# Patient Record
Sex: FEMALE | Race: WHITE | HISPANIC OR LATINO | Employment: FULL TIME | URBAN - METROPOLITAN AREA
[De-identification: names, ages, dates, MRNs, and addresses within clinical notes are randomized per-mention and may not be internally consistent; named-entity substitution may affect disease eponyms.]

---

## 2017-01-06 ENCOUNTER — OFFICE VISIT (OUTPATIENT)
Dept: BEHAVIORAL HEALTH | Facility: PHYSICIAN GROUP | Age: 27
End: 2017-01-06
Payer: COMMERCIAL

## 2017-01-06 DIAGNOSIS — F41.0 PANIC DISORDER WITHOUT AGORAPHOBIA: ICD-10-CM

## 2017-01-06 PROCEDURE — 90834 PSYTX W PT 45 MINUTES: CPT | Performed by: PSYCHOLOGIST

## 2017-01-06 NOTE — BH THERAPY
[facility]  Therapy Progress Note    Patient Name: Elinor Breaux  Patient MRN: [id number]  Today's Date: 1/6/2016     Type of session:Individual psychotherapy  Length of session: 50 minutes  Persons in attendance:Patient    Subjective/New Info: Pt reported experiencing anxiety over break while home with family, elpidio during a few foggy days. Pt reported leaving a few days early to return home. Pt also reported having feelings of constriction or tingling, elpidio when she is aware of her upstairs neighbors. Practiced extended mindfulness in session re: thoughts, feelings, and sensations. Pt reported having catastrophic thoughts/images. Discussed implementing mindfulness practice daily, returning attention back to her breath and bodily sensations for grounding purposes. Pt will continue mindfulness practice on own.     Objective/Observations:   Participation: Active verbal participation, Engaged and Open to feedback   Grooming: Casual   Cognition: Fully Oriented   Eye contact: Good   Mood: Euthymic and Anxious   Affect: Flexible, Congruent with content and Anxious   Thought process: Logical, Goal-directed and Linear   Speech: WNL    Diagnoses:   1. Panic disorder without agoraphobia       Current risk:   SUICIDE: Low. Denied SI, Intent, Plan.   Homicide: Not applicable   Self-harm: Not applicable   Relapse: Not applicable   Safety Plan reviewed? Not Indicated    Therapeutic Intervention(s): Assess mood, anxiety and coping, Mindfulness practice    Treatment Goal(s)/Objective(s) addressed: Reduce impact of anxiety on pt's mood and functioning, Increase valued-life attainment     Progress toward Treatment Goals: Mild improvement    Plan:  - Continue Individual therapy  - Patient is in agreement with the above plan:  YES    Cleo Talbert P.H.D.

## 2017-01-07 ENCOUNTER — HOSPITAL ENCOUNTER (OUTPATIENT)
Dept: LAB | Facility: MEDICAL CENTER | Age: 27
End: 2017-01-07
Attending: NURSE PRACTITIONER
Payer: COMMERCIAL

## 2017-01-07 DIAGNOSIS — E55.9 VITAMIN D DEFICIENCY: ICD-10-CM

## 2017-01-07 DIAGNOSIS — D72.819 LEUKOPENIA, UNSPECIFIED TYPE: ICD-10-CM

## 2017-01-07 DIAGNOSIS — R12 HEARTBURN: ICD-10-CM

## 2017-01-07 LAB
25(OH)D3 SERPL-MCNC: 31 NG/ML (ref 30–100)
BASOPHILS # BLD AUTO: 0.05 K/UL (ref 0–0.12)
BASOPHILS NFR BLD AUTO: 0.6 % (ref 0–1.8)
EOSINOPHIL # BLD: 0.33 K/UL (ref 0–0.51)
EOSINOPHIL NFR BLD AUTO: 4 % (ref 0–6.9)
ERYTHROCYTE [DISTWIDTH] IN BLOOD BY AUTOMATED COUNT: 40 FL (ref 35.9–50)
HCT VFR BLD AUTO: 40.4 % (ref 37–47)
HGB BLD-MCNC: 13 G/DL (ref 12–16)
IMM GRANULOCYTES # BLD AUTO: 0.02 K/UL (ref 0–0.11)
IMM GRANULOCYTES NFR BLD AUTO: 0.2 % (ref 0–0.9)
LYMPHOCYTES # BLD: 2.42 K/UL (ref 1–4.8)
LYMPHOCYTES NFR BLD AUTO: 29.2 % (ref 22–41)
MCH RBC QN AUTO: 26.3 PG (ref 27–33)
MCHC RBC AUTO-ENTMCNC: 32.2 G/DL (ref 33.6–35)
MCV RBC AUTO: 81.8 FL (ref 81.4–97.8)
MONOCYTES # BLD: 0.35 K/UL (ref 0–0.85)
MONOCYTES NFR BLD AUTO: 4.2 % (ref 0–13.4)
NEUTROPHILS # BLD: 5.11 K/UL (ref 2–7.15)
NEUTROPHILS NFR BLD AUTO: 61.8 % (ref 44–72)
NRBC # BLD AUTO: 0 K/UL
NRBC BLD-RTO: 0 /100 WBC
PLATELET # BLD AUTO: 381 K/UL (ref 164–446)
PMV BLD AUTO: 9.9 FL (ref 9–12.9)
RBC # BLD AUTO: 4.94 M/UL (ref 4.2–5.4)
WBC # BLD AUTO: 8.3 K/UL (ref 4.8–10.8)

## 2017-01-07 PROCEDURE — 36415 COLL VENOUS BLD VENIPUNCTURE: CPT

## 2017-01-07 PROCEDURE — 82306 VITAMIN D 25 HYDROXY: CPT

## 2017-01-07 PROCEDURE — 85025 COMPLETE CBC W/AUTO DIFF WBC: CPT

## 2017-01-11 DIAGNOSIS — R12 HEARTBURN: ICD-10-CM

## 2017-01-12 RX ORDER — OMEPRAZOLE 20 MG/1
20 CAPSULE, DELAYED RELEASE ORAL DAILY
Qty: 90 CAP | Refills: 0 | Status: SHIPPED | OUTPATIENT
Start: 2017-01-12 | End: 2017-05-30 | Stop reason: SDUPTHER

## 2017-01-12 NOTE — TELEPHONE ENCOUNTER
Please advise patient refill for 90 days, needs to keep upcoming appointment to est. Care with new provider. Thank you. ALINA Estrada

## 2017-01-12 NOTE — TELEPHONE ENCOUNTER
From: Elinor Breaux  To: SHAY Ibarra  Sent: 1/11/2017 10:15 PM PST  Subject: Medication Renewal Request    Original authorizing provider: SHAY Ibarra would like a refill of the following medications:  omeprazole (PRILOSEC) 20 MG delayed-release capsule [SHAY Ibarra]    Preferred pharmacy: [facility] [address] [place] [address], [place] - [address] [facility] [address]    Comment:

## 2017-01-20 ENCOUNTER — OFFICE VISIT (OUTPATIENT)
Dept: BEHAVIORAL HEALTH | Facility: PHYSICIAN GROUP | Age: 27
End: 2017-01-20
Payer: COMMERCIAL

## 2017-01-20 DIAGNOSIS — F41.0 PANIC DISORDER WITHOUT AGORAPHOBIA: ICD-10-CM

## 2017-01-20 PROCEDURE — 90834 PSYTX W PT 45 MINUTES: CPT | Performed by: PSYCHOLOGIST

## 2017-01-21 NOTE — BH THERAPY
" [facility]  Therapy Progress Note    Patient Name: Elinor Breaux  Patient MRN: [id number]  Today's Date: 1/19/2017     Type of session:Individual psychotherapy  Length of session: 45 minutes  Persons in attendance:Patient    Subjective/New Info: Pt reported some improvements in her anxiety. Has not had a panic attack since prior to last session. Still endorses anxious feelings and symptoms characteristic of panic disorder. Also endorses thoughts about \"going crazy\" and fears of not getting better. Discussed course and prognosis of anxiety treatments. Also introduced engagement in valued-life domains as important treatment outcome measure. Engaged in mindfulness practice and process.  . Pt will continue mindfulness practice on own.     Objective/Observations:   Participation: Active verbal participation, Engaged and Open to feedback   Grooming: Casual   Cognition: Fully Oriented   Eye contact: Good   Mood: Euthymic and Anxious   Affect: Flexible, Congruent with content, Anxious and Tearful   Thought process: Logical, Goal-directed and Linear   Speech: WNL    Diagnoses:   1. Panic disorder without agoraphobia       Current risk:   SUICIDE: Low. Denied SI, Intent, Plan.   Homicide: Not applicable   Self-harm: Not applicable   Relapse: Not applicable   Safety Plan reviewed? Not Indicated    Therapeutic Intervention(s): Assess mood, anxiety and coping, course and prognosis for anxiety treatment, Mindfulness practice    Treatment Goal(s)/Objective(s) addressed: Reduce impact of anxiety on pt's mood and functioning, Increase valued-life attainment     Progress toward Treatment Goals: Mild improvement    Plan:  - Continue Individual therapy  - Patient is in agreement with the above plan:  YES    JEANNETTE InterianoHDARLYN.                                     "

## 2017-02-21 ENCOUNTER — OFFICE VISIT (OUTPATIENT)
Dept: MEDICAL GROUP | Facility: MEDICAL CENTER | Age: 27
End: 2017-02-21
Payer: COMMERCIAL

## 2017-02-21 VITALS
SYSTOLIC BLOOD PRESSURE: 120 MMHG | RESPIRATION RATE: 16 BRPM | DIASTOLIC BLOOD PRESSURE: 78 MMHG | BODY MASS INDEX: 40.86 KG/M2 | OXYGEN SATURATION: 97 % | HEIGHT: 60 IN | HEART RATE: 99 BPM | TEMPERATURE: 97.9 F | WEIGHT: 208.11 LBS

## 2017-02-21 DIAGNOSIS — E66.01 MORBID OBESITY WITH BMI OF 40.0-44.9, ADULT (HCC): ICD-10-CM

## 2017-02-21 DIAGNOSIS — Z91.018 MULTIPLE FOOD ALLERGIES: ICD-10-CM

## 2017-02-21 DIAGNOSIS — F41.0 PANIC DISORDER WITHOUT AGORAPHOBIA: ICD-10-CM

## 2017-02-21 DIAGNOSIS — R12 HEARTBURN: ICD-10-CM

## 2017-02-21 DIAGNOSIS — E55.9 VITAMIN D DEFICIENCY: ICD-10-CM

## 2017-02-21 DIAGNOSIS — E78.5 HYPERLIPIDEMIA, UNSPECIFIED HYPERLIPIDEMIA TYPE: ICD-10-CM

## 2017-02-21 DIAGNOSIS — F41.9 ANXIETY: ICD-10-CM

## 2017-02-21 PROCEDURE — 99214 OFFICE O/P EST MOD 30 MIN: CPT | Performed by: PHYSICIAN ASSISTANT

## 2017-02-21 RX ORDER — ALPRAZOLAM 0.5 MG/1
0.25 TABLET ORAL 3 TIMES DAILY PRN
Qty: 20 TAB | Refills: 0 | Status: SHIPPED | OUTPATIENT
Start: 2017-02-21 | End: 2017-09-01

## 2017-02-21 ASSESSMENT — PAIN SCALES - GENERAL: PAINLEVEL: NO PAIN

## 2017-02-21 NOTE — MR AVS SNAPSHOT
Elinor Breaux   2017 1:00 PM   Office Visit   MRN: [id number]    Department:  [place] [address]   Dept Phone:  [phone]    Description:  Female : 1990   Provider:  Cyn Guerin PA-C           Reason for Visit     Establish Care xanax refill       Allergies as of 2017     Allergen Noted Reactions    Iodine 2017   Anaphylaxis    Seafood allergy     Tree Nuts Food Allergy 2016   Anaphylaxis    Peanut allergy      You were diagnosed with     Heartburn   [787.1.ICD-9-CM]       Vitamin D deficiency   [7586833]       Anxiety   [206489]       Panic disorder without agoraphobia   [300.01.ICD-9-CM]       Multiple food allergies   [286141]       Hyperlipidemia, unspecified hyperlipidemia type   [8501955]         Vital Signs     Blood Pressure Pulse Temperature Respirations Height Weight    120/78 mmHg 99 36.6 °C (97.9 °F) 16 1.524 m (5') 94.4 kg (208 lb 1.8 oz)    Body Mass Index Oxygen Saturation Last Menstrual Period Breastfeeding? Smoking Status       40.64 kg/m2 97% 2016 No Never Smoker        Basic Information     Date Of Birth Sex Race Ethnicity Preferred Language    1990 Female White  Origin (Belarusian,Burmese,Tuvaluan,Sharan, etc) English      Your appointments     Mar 10, 2017  2:00 PM   Individual Therapy with Cleo Talbert P.H.D.   [facility]    [address]  [address]  [place] [address]   [phone]            Mar 24, 2017  3:00 PM   Individual Therapy with JEANNETTE InterianoHCRUZITO   [facility]    [address]  [address]  [place] [address]   [phone]            2017  3:00 PM   Individual Therapy with Cleo Talbert P.H.D.   [facility]    [address]  [address]  [place] [address]   [phone]            2017  3:00 PM   Individual Therapy with JEANNETTE InterianoHCRUZITO   [facility]    [address]  [address]  [place] [id number] [phone]            Jun 09, 2017  1:00 PM   Established Patient with Cyn Guerin PA-C   [facility]    [address]  [address]  [place] [address] [phone]           You will be receiving a confirmation call a few days before your appointment from our automated call confirmation system.              Problem List              ICD-10-CM Priority Class Noted - Resolved    Multiple food allergies Z91.018   8/12/2016 - Present    Preventative health care Z00.00   8/12/2016 - Present    Obesity (BMI 35.0-39.9 without comorbidity) (HCC) E66.9   8/12/2016 - Present    Heartburn R12   10/14/2016 - Present    Vitamin D deficiency E55.9   10/14/2016 - Present    Panic disorder without agoraphobia F41.0   10/21/2016 - Present    Situational phobia F40.248   10/21/2016 - Present      Health Maintenance        Date Due Completion Dates    PAP SMEAR 6/2/2009 ---    IMM INFLUENZA (1) 9/1/2016 ---    IMM DTaP/Tdap/Td Vaccine (2 - Td) 5/10/2023 5/10/2013            Current Immunizations     Tdap Vaccine 5/10/2013      Below and/or attached are the medications your provider expects you to take. Review all of your home medications and newly ordered medications with your provider and/or pharmacist. Follow medication instructions as directed by your provider and/or pharmacist. Please keep your medication list with you and share with your provider. Update the information when medications are discontinued, doses are changed, or new medications (including over-the-counter products) are added; and carry medication information at all times in the event of emergency situations     Allergies:  IODINE - Anaphylaxis     TREE NUTS FOOD ALLERGY - Anaphylaxis               Medications  Valid as of: February 21, 2017 -  1:51 PM    Generic Name Brand Name Tablet Size Instructions for use    ALPRAZolam (Tab) XANAX 0.5 MG Take 0.5 Tabs by mouth 3  times a day as needed for Sleep.        EPINEPHrine   by Injection route.        Ergocalciferol (Cap) DRISDOL 19583 UNIT Take 1 Cap by mouth every 7 days.        Omeprazole (CAPSULE DELAYED RELEASE) PRILOSEC 20 MG Take 1 Cap by mouth every day.        .                 Medicines prescribed today were sent to:     [facility]  [place], [place] - [address] [facility]    [address] [facility] [place] [address]    Phone: [phone] Fax: [phone]    Open 24 Hours?: No      Medication refill instructions:       If your prescription bottle indicates you have medication refills left, it is not necessary to call your provider’s office. Please contact your pharmacy and they will refill your medication.    If your prescription bottle indicates you do not have any refills left, you may request refills at any time through one of the following ways: The online [facility] system (except Urgent Care), by calling your provider’s office, or by asking your pharmacy to contact your provider’s office with a refill request. Medication refills are processed only during regular business hours and may not be available until the next business day. Your provider may request additional information or to have a follow-up visit with you prior to refilling your medication.   *Please Note: Medication refills are assigned a new Rx number when refilled electronically. Your pharmacy may indicate that no refills were authorized even though a new prescription for the same medication is available at the pharmacy. Please request the medicine by name with the pharmacy before contacting your provider for a refill.        Your To Do List     Future Labs/Procedures Complete By Expires    LIPID PROFILE  As directed 2/22/2018    PAIN MANAGEMENT SCRN, UR  As directed 2/21/2018    Comments:    Current Meds (name, sig, last dose):   Current outpatient prescriptions:   •  alprazolam, 0.25 mg, Oral, TID PRN  •  omeprazole, 20 mg,  Oral, DAILY  •  ergocalciferol, 50,000 Units, Oral, Q7 DAYS  •  EPINEPHrine (EPIPEN INJ), by Injection route., prn          VITAMIN D,25 HYDROXY  As directed 2/22/2018         [employer] Access Code: Activation code not generated  Current Cliqset Status: Active

## 2017-02-21 NOTE — ASSESSMENT & PLAN NOTE
Has been seeing therapist since 10/2016.   Has been much better but still likes to have xanax 0.5mg prn for plane rides or other occasions.   - 8/01/16 xanax 0.5mg #20.   She is just now running out and would like refill.   Never drinks or drives with med.

## 2017-02-21 NOTE — ASSESSMENT & PLAN NOTE
Was really bad when 22yo.   Coffee, pizza and other mexican foods would make it back.   Treating with prilosec 20mg qd  No sx since starting this med.   Denies CP, SOB, coughing.

## 2017-02-21 NOTE — PROGRESS NOTES
Chief Complaint   Patient presents with   • Establish Care     xanax refill      HPI:   Marquita Ding is a 26 y.o. female here to establish care and discuss vitamin D, heartburn, panic disorder    Was seeing Julieta Tucker PA-C    Heartburn  Was really bad when 22yo.   Coffee, pizza and other mexican foods would make it back.   Treating with prilosec 20mg qd  No sx since starting this med.   Denies CP, SOB, coughing.     Vitamin D deficiency  Was prescribed vit D 50,000iu weekly for 3 mo.   So stopped taking this when it ran out.   Results for MARQUITA DING (MRN [id number]) as of 2/21/2017 13:23   Ref. Range 10/1/2016 11:44 1/7/2017 08:45   25-Hydroxy   Vitamin D 25 Latest Ref Range:  ng/mL 18 (L) 31       Panic disorder without agoraphobia  Has been seeing therapist since 10/2016.   Has been much better but still likes to have xanax 0.5mg prn for plane rides or other occasions.   - 8/01/16 xanax 0.5mg #20.   She is just now running out and would like refill.   Never drinks or drives with med.       Current medicines (including changes today)  Current Outpatient Prescriptions   Medication Sig Dispense Refill   • alprazolam (XANAX) 0.5 MG Tab Take 0.5 Tabs by mouth 3 times a day as needed for Sleep. 20 Tab 0   • omeprazole (PRILOSEC) 20 MG delayed-release capsule Take 1 Cap by mouth every day. 90 Cap 0   • ergocalciferol (DRISDOL) 83683 UNIT capsule Take 1 Cap by mouth every 7 days. 12 Cap 0   • EPINEPHrine (EPIPEN INJ) by Injection route.       No current facility-administered medications for this visit.     She  has a past medical history of Anxiety; Asthma; and Allergy.  She  has no past surgical history on file.  Social History   Substance Use Topics   • Smoking status: Never Smoker    • Smokeless tobacco: Never Used   • Alcohol Use: Yes      Comment: 2 times every other month     Social History     Social History Narrative     Family History   Problem Relation Age of Onset   • Hypertension Mother    •  Anxiety disorder Mother    • Alcohol abuse Mother    • Diabetes Father    • Anxiety disorder Maternal Aunt    • Drug abuse Maternal Aunt    • Anxiety disorder Maternal Uncle      Family Status   Relation Status Death Age   • Mother Alive    • Father Alive    • Sister Alive    • Brother Alive        ROS  Constitutional: Negative for fever, chills, weight loss  HENT: Negative for ear pain, nosebleeds, congestion, sore throat and neck pain.   Eyes: Negative for blurred vision.   Respiratory: Negative for cough, sputum production, shortness of breath and wheezing.   Cardiovascular: Negative for chest pain, palpitations, orthopnea and leg swelling.   Gastrointestinal: + heartburn, negative for nausea, vomiting and abdominal pain.   Genitourinary: Negative for dysuria, urgency and frequency.   Musculoskeletal: Negative for myalgias, back pain and joint pain.   Skin: Negative for rash and itching.   Neurological: Negative for dizziness, tingling, tremors, sensory change, focal weakness and headaches.   Endo/Heme/Allergies: Does not bruise/bleed easily.   Psychiatric/Behavioral: Negative for depression, + anxiety, or neg memory loss.   All other systems reviewed and are negative except as in HPI.     Objective:     Blood pressure 120/78, pulse 99, temperature 36.6 °C (97.9 °F), resp. rate 16, height 1.524 m (5'), weight 94.4 kg (208 lb 1.8 oz), last menstrual period 02/01/2016, SpO2 97 %, not currently breastfeeding. Body mass index is 40.64 kg/(m^2).  Physical Exam:    Constitutional: Alert, no distress.  Skin: Warm, dry, good turgor, no rashes in visible areas.  Eye: PERRLA, conjunctiva clear, lids normal.  ENMT: Lips without lesions, good dentition, oropharynx clear.  Neck: Trachea midline, no masses, no thyromegaly.  Respiratory: Unlabored respiratory effort, lungs clear to auscultation, no wheezes, no ronchi.  Cardiovascular: Normal S1, S2, no murmur, no edema.  Abdomen: Soft, non-tender, no masses, no  hepatosplenomegaly.  Psych: Alert and oriented x3, normal affect and mood.    Assessment and Plan:   The following treatment plan was discussed     1. Heartburn  Controlled, since she has skip doses has felt fine. Discussed weaning off of this.  Continue working on diet, avoiding aggravating foods, elevating bed, not eating too late before lying down.    2.  Vitamin D deficiency  Much improved, continue with OTC vitamin D3. We'll recheck labs in another 3mo  - VITAMIN D,25 HYDROXY; Future    3. Anxiety  Stable, mostly just has claustrophobia especially with plane flights.  Refill give.   Do not drink or drive with med  - alprazolam (XANAX) 0.5 MG Tab; Take 0.5 Tabs by mouth 3 times a day as needed for Sleep.  Dispense: 20 Tab; Refill: 0  - Controlled Substance Treatment Agreement  - PAIN MANAGEMENT SCRN, UR; Future    4. Panic disorder without agoraphobia  See #3  - alprazolam (XANAX) 0.5 MG Tab; Take 0.5 Tabs by mouth 3 times a day as needed for Sleep.  Dispense: 20 Tab; Refill: 0  - Controlled Substance Treatment Agreement  - PAIN MANAGEMENT SCRN, UR; Future    5. Multiple food allergies  Multiple food allergies, has EpiPen on hand    6. Hyperlipidemia, unspecified hyperlipidemia type  Elevated, working on diet and exercise, has been for over 2 months now  We'll recheck labs in 4 months  - LIPID PROFILE; Future    7. Morbid obesity with BMI of 40.0-44.9, adult (CMS[facility])  working on diet and exercise, has been for over 2 months now. Encouraged to continue  Has seen a nutritionist in past. We'll follow-up with him if needed.  - Patient identified as having weight management issue.  Appropriate orders and counseling given.      Records requested.  Followup: Return in about 3 months (around 5/21/2017) for review labs.           Please note that this dictation was created using voice recognition software. I have made every reasonable attempt to correct obvious errors, but I expect that there are errors of grammar and  possibly content that I did not discover before finalizing the note.

## 2017-02-21 NOTE — ASSESSMENT & PLAN NOTE
Was prescribed vit D 50,000iu weekly for 3 mo.   So stopped taking this when it ran out.   Results for MARQUITA DIGN (MRN [id number]) as of 2/21/2017 13:23   Ref. Range 10/1/2016 11:44 1/7/2017 08:45   25-Hydroxy   Vitamin D 25 Latest Ref Range:  ng/mL 18 (L) 31

## 2017-03-10 ENCOUNTER — OFFICE VISIT (OUTPATIENT)
Dept: PEDIATRICS | Facility: CLINIC | Age: 27
End: 2017-03-10
Payer: COMMERCIAL

## 2017-03-10 DIAGNOSIS — F40.248 SITUATIONAL PHOBIA: ICD-10-CM

## 2017-03-10 DIAGNOSIS — F41.0 PANIC DISORDER WITHOUT AGORAPHOBIA: ICD-10-CM

## 2017-03-10 PROCEDURE — 90832 PSYTX W PT 30 MINUTES: CPT | Performed by: PSYCHOLOGIST

## 2017-03-10 NOTE — PROGRESS NOTES
Note Title:  Individual Outpatient Psychotherapy Progress Note      Name:  Elinor Breaux  MRN:  [id number]  :  1990  Age:  26 y.o.    Pediatrician:  Cyn Guerin PA-C    Service Rendered:  Individual Psychotherapy  Date of Service:  3/10/2017  Length of Service:  35 minutes    Persons in Attendence: Elinor    Interim History:  Pt reported continued improvements in her anxiety. Has not had a panic attack for several months now. Still experiences anticipatory anxiety or relaxation-induced anxiety, but anxiety has lessened in intensity, frequency and duration. Pt reported that it is interfering with her life much less. Experienced anxiety prior to flying, but managed it with medication without avoiding or changing the flight. Discussed course and prognosis of anxiety treatments. Also introduced engagement in valued-life domains as important treatment outcome measure. Discussed mindfulness practice/process. Pt will continue mindfulness practice on own.     Diagnostic Impressions:    1. Panic disorder without agoraphobia    2. Situational phobia        Mental Status Exam:   Appearance:  Well groomed, good hygiene, appears stated age.   Behavior:  Pleasant, sociable, cooperative.   Mood:  “good,” slightly anxious.   Affect:  Appropriate to mood, normal range.   Speech/Language:  Normal rate, rhythm, and tone.   Sensorium:  Alert and oriented to person, place, time, and situation.   Memory and Cognition:  Within normal limits, no evidence of gross cognitive, intellectual, or memory impairments   Thought Process/Thought Content:  Logical, linear, goal directed. Reality testing appears intact.    Insight/Judgment: Good.      Risk Assessment:  Elinor denied concerns regarding risk to self or others.       Treatment Recommendations and Plan:  Will continue individual therapy utilizing ACT approach to anxiety treatment.     The above diagnostic impressions, recommendations, and treatment plan were discussed  with and agreed upon by Elinor and his/her parents. Care will be coordinated with Elinor’s healthcare team, as appropriate.      Cleo Talbert, PhD  Licensed Psychologist  [facility]

## 2017-03-10 NOTE — MR AVS SNAPSHOT
Nahomizan Brooksas   3/10/2017 2:00 PM   Office Visit   MRN: [id number]    Department:  [facility] Med - Pediatrics   Dept Phone:  [phone]    Description:  Female : 1990   Provider:  Cleo Talbert P.H.D.           Reason for Visit     Anxiety           Allergies as of 3/10/2017     Allergen Noted Reactions    Iodine 2017   Anaphylaxis    Seafood allergy     Tree Nuts Food Allergy 2016   Anaphylaxis    Peanut allergy      You were diagnosed with     Panic disorder without agoraphobia   [300.01.ICD-9-CM]       Situational phobia   [871455]         Vital Signs     Last Menstrual Period Smoking Status                2016 Never Smoker           Basic Information     Date Of Birth Sex Race Ethnicity Preferred Language    1990 Female White  Origin (Persian,Bulgarian,Indian,Sharan, etc) English      Your appointments     Mar 24, 2017  3:00 PM   Individual Therapy with Cleo Talbert P.H.D.   [address] (--)    [address] [place] [address]  [facility] [id number] [phone]            2017  3:00 PM   Individual Therapy with Cleo Talbert P.H.D.   [address] (--)    [address] [place] [address], [facility] 201  [facility] 319092 [phone]            2017  3:00 PM   Individual Therapy with Cleo Talbert P.H.D.   [address] (--)    [address] [place] [address], [address]  [facility] 085082 [phone]            2017  1:00 PM   Established Patient with Cyn Guerin PA-C   [facility]    [address]  [place] [address] [phone]           You will be receiving a confirmation call a few days before your appointment from our automated call confirmation system.              Problem List              ICD-10-CM Priority Class Noted - Resolved    Multiple food allergies Z91.018    8/12/2016 - Present    Preventative health care Z00.00   8/12/2016 - Present    Obesity (BMI 35.0-39.9 without comorbidity) ([facility]) E66.9   8/12/2016 - Present    Heartburn R12   10/14/2016 - Present    Vitamin D deficiency E55.9   10/14/2016 - Present    Panic disorder without agoraphobia F41.0   10/21/2016 - Present    Situational phobia F40.248   10/21/2016 - Present    Morbid obesity with BMI of 40.0-44.9, adult ([facility]) E66.01, Z68.41   2/21/2017 - Present      Health Maintenance        Date Due Completion Dates    PAP SMEAR 6/2/2009 ---    IMM INFLUENZA (1) 9/1/2016 ---    IMM DTaP/Tdap/Td Vaccine (2 - Td) 5/10/2023 5/10/2013            Current Immunizations     Tdap Vaccine 5/10/2013      Below and/or attached are the medications your provider expects you to take. Review all of your home medications and newly ordered medications with your provider and/or pharmacist. Follow medication instructions as directed by your provider and/or pharmacist. Please keep your medication list with you and share with your provider. Update the information when medications are discontinued, doses are changed, or new medications (including over-the-counter products) are added; and carry medication information at all times in the event of emergency situations     Allergies:  IODINE - Anaphylaxis     TREE NUTS FOOD ALLERGY - Anaphylaxis               Medications  Valid as of: March 10, 2017 -  3:30 PM    Generic Name Brand Name Tablet Size Instructions for use    ALPRAZolam (Tab) XANAX 0.5 MG Take 0.5 Tabs by mouth 3 times a day as needed for Sleep.        EPINEPHrine   by Injection route.        Ergocalciferol (Cap) DRISDOL 38908 UNIT Take 1 Cap by mouth every 7 days.        Omeprazole (CAPSULE DELAYED RELEASE) PRILOSEC 20 MG Take 1 Cap by mouth every day.        .                 Medicines prescribed today were sent to:     [id number] - [place], [place] - [address] [facility] [address]    [address] [place]  [place] [address]    Phone: [phone] Fax: [phone]    Open 24 Hours?: No      Medication refill instructions:       If your prescription bottle indicates you have medication refills left, it is not necessary to call your provider’s office. Please contact your pharmacy and they will refill your medication.    If your prescription bottle indicates you do not have any refills left, you may request refills at any time through one of the following ways: The online MDCapsule system (except Urgent Care), by calling your provider’s office, or by asking your pharmacy to contact your provider’s office with a refill request. Medication refills are processed only during regular business hours and may not be available until the next business day. Your provider may request additional information or to have a follow-up visit with you prior to refilling your medication.   *Please Note: Medication refills are assigned a new Rx number when refilled electronically. Your pharmacy may indicate that no refills were authorized even though a new prescription for the same medication is available at the pharmacy. Please request the medicine by name with the pharmacy before contacting your provider for a refill.           MDCapsule Access Code: Activation code not generated  Current MDCapsule Status: Active

## 2017-03-24 ENCOUNTER — OFFICE VISIT (OUTPATIENT)
Dept: PEDIATRICS | Facility: CLINIC | Age: 27
End: 2017-03-24
Payer: COMMERCIAL

## 2017-03-24 DIAGNOSIS — F41.0 PANIC DISORDER WITHOUT AGORAPHOBIA: ICD-10-CM

## 2017-03-24 DIAGNOSIS — F40.248 SITUATIONAL PHOBIA: ICD-10-CM

## 2017-03-24 PROCEDURE — 90834 PSYTX W PT 45 MINUTES: CPT | Performed by: PSYCHOLOGIST

## 2017-03-24 NOTE — MR AVS SNAPSHOT
Nahomizan Brooksas   3/24/2017 3:00 PM   Appointment   MRN: [id number]    Department:  [facility] Med - Pediatrics   Dept Phone:  [phone]    Description:  Female : 1990   Provider:  Cleo Talbert P.H.D.           Allergies as of 3/24/2017     Allergen Noted Reactions    Iodine 2017   Anaphylaxis    Seafood allergy     Tree Nuts Food Allergy 2016   Anaphylaxis    Peanut allergy      Vital Signs     Smoking Status                   Never Smoker            Basic Information     Date Of Birth Sex Race Ethnicity Preferred Language    1990 Female White  Origin (Indian,Ghanaian,Dominican,Sharan, etc) English      Your appointments     2017  3:00 PM   Individual Therapy with Cleo Talbert P.H.D.   [address] (--)    [address] [place] [address], [address]  [facility] [address]6482 [phone]            2017  3:00 PM   Individual Therapy with Cleo Talbert P.H.D.   [address] (--)    [address] [place] [address], [address]  [facility] [address] [phone]            2017  1:00 PM   Established Patient with Cyn Guerin PA-C   [facility]    [address]  [address]  [place] [address] [phone]           You will be receiving a confirmation call a few days before your appointment from our automated call confirmation system.              Problem List              ICD-10-CM Priority Class Noted - Resolved    Multiple food allergies Z91.018   2016 - Present    Preventative health care Z00.00   2016 - Present    Obesity (BMI 35.0-39.9 without comorbidity) ([facility]) E66.9   2016 - Present    Heartburn R12   10/14/2016 - Present    Vitamin D deficiency E55.9   10/14/2016 - Present    Panic disorder without agoraphobia F41.0   10/21/2016 - Present    Situational phobia F40.248   10/21/2016 - Present    Morbid obesity with BMI of  40.0-44.9, adult ([facility]) E66.01, Z68.41   2/21/2017 - Present      Health Maintenance        Date Due Completion Dates    PAP SMEAR 6/2/2009 ---    IMM INFLUENZA (1) 9/1/2016 ---    IMM DTaP/Tdap/Td Vaccine (2 - Td) 5/10/2023 5/10/2013            Current Immunizations     Tdap Vaccine 5/10/2013      Below and/or attached are the medications your provider expects you to take. Review all of your home medications and newly ordered medications with your provider and/or pharmacist. Follow medication instructions as directed by your provider and/or pharmacist. Please keep your medication list with you and share with your provider. Update the information when medications are discontinued, doses are changed, or new medications (including over-the-counter products) are added; and carry medication information at all times in the event of emergency situations     Allergies:  IODINE - Anaphylaxis     TREE NUTS FOOD ALLERGY - Anaphylaxis               Medications  Valid as of: March 24, 2017 -  3:51 PM    Generic Name Brand Name Tablet Size Instructions for use    ALPRAZolam (Tab) XANAX 0.5 MG Take 0.5 Tabs by mouth 3 times a day as needed for Sleep.        EPINEPHrine   by Injection route.        Ergocalciferol (Cap) DRISDOL 87325 UNIT Take 1 Cap by mouth every 7 days.        Omeprazole (CAPSULE DELAYED RELEASE) PRILOSEC 20 MG Take 1 Cap by mouth every day.        .                 Medicines prescribed today were sent to:     [facility] [id number] [place] [address] - [facility] [address] [facility] AT [facility] & [address]    [address] [address]    Phone: [phone] Fax: [phone]    Open 24 Hours?: No      Medication refill instructions:       If your prescription bottle indicates you have medication refills left, it is not necessary to call your provider’s office. Please contact your pharmacy and they will refill your medication.    If your prescription bottle indicates you do not have any refills left, you may request  refills at any time through one of the following ways: The online StyleZen system (except Urgent Care), by calling your provider’s office, or by asking your pharmacy to contact your provider’s office with a refill request. Medication refills are processed only during regular business hours and may not be available until the next business day. Your provider may request additional information or to have a follow-up visit with you prior to refilling your medication.   *Please Note: Medication refills are assigned a new Rx number when refilled electronically. Your pharmacy may indicate that no refills were authorized even though a new prescription for the same medication is available at the pharmacy. Please request the medicine by name with the pharmacy before contacting your provider for a refill.           StyleZen Access Code: Activation code not generated  Current StyleZen Status: Active

## 2017-04-21 ENCOUNTER — OFFICE VISIT (OUTPATIENT)
Dept: PEDIATRICS | Facility: CLINIC | Age: 27
End: 2017-04-21
Payer: COMMERCIAL

## 2017-04-21 DIAGNOSIS — F40.248 SITUATIONAL PHOBIA: ICD-10-CM

## 2017-04-21 DIAGNOSIS — F41.0 PANIC DISORDER WITHOUT AGORAPHOBIA: ICD-10-CM

## 2017-04-21 PROCEDURE — 90834 PSYTX W PT 45 MINUTES: CPT | Performed by: PSYCHOLOGIST

## 2017-04-21 NOTE — MR AVS SNAPSHOT
Nahomizan Brooksas   2017 3:00 PM   Office Visit   MRN: [id number]    Department:  [facility] Med - Pediatrics   Dept Phone:  [phone]    Description:  Female : 1990   Provider:  Cleo Talbert P.H.D.           Reason for Visit     Anxiety           Allergies as of 2017     Allergen Noted Reactions    Iodine 2017   Anaphylaxis    Seafood allergy     Tree Nuts Food Allergy 2016   Anaphylaxis    Peanut allergy      You were diagnosed with     Panic disorder without agoraphobia   [300.01.ICD-9-CM]       Situational phobia   [880115]         Vital Signs     Smoking Status                   Never Smoker            Basic Information     Date Of Birth Sex Race Ethnicity Preferred Language    1990 Female White  Origin (Icelandic,Faroese,Pakistani,Sharan, etc) English      Your appointments     2017  1:00 PM   Established Patient with Cyn Guerin PA-C   [facility]    [address]  [address]  [facility] [id number] [phone]           You will be receiving a confirmation call a few days before your appointment from our automated call confirmation system.            2017  3:00 PM   Individual Therapy with Cleo Talbert P.H.D.   [address] (--)    [address]  [facility] [address]   [phone]            2017  1:00 PM   ANNUAL EXAM PREVENTATIVE with Cyn Guerin PA-C   [facility]    [address]  [address]  [facility] [address] [phone]            2017  3:00 PM   Individual Therapy with Cleo Talbert P.H.D.   [address] (--)    [address]  [facility] [address]   [phone]            2017  3:00 PM   Individual Therapy with Cleo Talbert P.H.D.   [address]  (--)    [address] [facility], [address]  [place] [address]   [phone]              Problem List              ICD-10-CM Priority Class Noted - Resolved    Multiple food allergies Z91.018   8/12/2016 - Present    Preventative health care Z00.00   8/12/2016 - Present    Obesity (BMI 35.0-39.9 without comorbidity) ([facility]) E66.9   8/12/2016 - Present    Heartburn R12   10/14/2016 - Present    Vitamin D deficiency E55.9   10/14/2016 - Present    Panic disorder without agoraphobia F41.0   10/21/2016 - Present    Situational phobia F40.248   10/21/2016 - Present    Morbid obesity with BMI of 40.0-44.9, adult ([facility]) E66.01, Z68.41   2/21/2017 - Present      Health Maintenance        Date Due Completion Dates    PAP SMEAR 6/2/2009 ---    IMM DTaP/Tdap/Td Vaccine (2 - Td) 5/10/2023 5/10/2013            Current Immunizations     Tdap Vaccine 5/10/2013      Below and/or attached are the medications your provider expects you to take. Review all of your home medications and newly ordered medications with your provider and/or pharmacist. Follow medication instructions as directed by your provider and/or pharmacist. Please keep your medication list with you and share with your provider. Update the information when medications are discontinued, doses are changed, or new medications (including over-the-counter products) are added; and carry medication information at all times in the event of emergency situations     Allergies:  IODINE - Anaphylaxis     TREE NUTS FOOD ALLERGY - Anaphylaxis               Medications  Valid as of: April 21, 2017 -  4:05 PM    Generic Name Brand Name Tablet Size Instructions for use    ALPRAZolam (Tab) XANAX 0.5 MG Take 0.5 Tabs by mouth 3 times a day as needed for Sleep.        EPINEPHrine   by Injection route.        Ergocalciferol (Cap) DRISDOL 43596 UNIT Take 1 Cap by mouth every 7 days.        Omeprazole (CAPSULE DELAYED RELEASE) PRILOSEC 20 MG Take 1 Cap by mouth every day.        .                    Medicines prescribed today were sent to:     [id number] - [place], [place] - [address] [facility] [address] [facility] [address] [address]    [address] [place] [place] [address]    Phone: [phone] Fax: [phone]    Open 24 Hours?: No      Medication refill instructions:       If your prescription bottle indicates you have medication refills left, it is not necessary to call your provider’s office. Please contact your pharmacy and they will refill your medication.    If your prescription bottle indicates you do not have any refills left, you may request refills at any time through one of the following ways: The online Spoonfed system (except Urgent Care), by calling your provider’s office, or by asking your pharmacy to contact your provider’s office with a refill request. Medication refills are processed only during regular business hours and may not be available until the next business day. Your provider may request additional information or to have a follow-up visit with you prior to refilling your medication.   *Please Note: Medication refills are assigned a new Rx number when refilled electronically. Your pharmacy may indicate that no refills were authorized even though a new prescription for the same medication is available at the pharmacy. Please request the medicine by name with the pharmacy before contacting your provider for a refill.           Spoonfed Access Code: Activation code not generated  Current Spoonfed Status: Active

## 2017-04-21 NOTE — PROGRESS NOTES
"      Note Title:  Individual Outpatient Psychotherapy Progress Note      Name:  Elinor Breaux  MRN:  [id number]  :  1990  Age:  26 y.o.    PCP:  Cyn Guerin PA-C    Service Rendered:  Individual Psychotherapy  Date of Service:  2017  Length of Service:  50 minutes    Persons in Attendence: Elinor    Interim History:  Pt reported continued improvements in her anxiety and how she is coping with it. Pt reported increased financial stressors (e.g., increased rent, issue with merit increase at work, and loan to parents). Discussed and processed emotional reactions. Pt reported not engaging in the catastrophic thinking as she would have before. Stated she feels anxious, but keeps herself much more grounded. Engaged in SAC/values experiential exercise. Pt identified fear of ending up alone. Presented pain/values link, more specifically how fear and inadequacy shows up \"when putting herself out there,\" elpidio. For potential with male partners. Engaged in values clarification/action. Pt will continue mindfulness practice on own.     Diagnostic Impressions:    1. Panic disorder without agoraphobia    2. Situational phobia      Mental Status Exam:   Appearance:  Well groomed, good hygiene, appears stated age.   Behavior:  Pleasant, sociable, tearful.    Mood:  \"stressed,” slightly anxious/depressed.   Affect:  Appropriate to mood, constricted range.   Speech/Language:  Normal rate, rhythm, and tone.   Sensorium:  Alert and oriented to person, place, time, and situation.   Memory and Cognition:  Within normal limits, no evidence of gross cognitive, intellectual, or memory impairments   Thought Process/Thought Content:  Logical, linear, goal directed. Reality testing appears intact.    Insight/Judgment: Good.      Risk Assessment:  Elinor denied concerns regarding risk to self or others.       Treatment Recommendations and Plan:    Will continue individual therapy utilizing ACT approach to anxiety treatment.     The " above diagnostic impressions, recommendations, and treatment plan were discussed with and agreed upon by Elinor. Care will be coordinated with Elinor’s healthcare team, as appropriate.      Cleo Talbert, PhD  Licensed Psychologist  [facility]

## 2017-05-26 ENCOUNTER — HOSPITAL ENCOUNTER (OUTPATIENT)
Dept: LAB | Facility: MEDICAL CENTER | Age: 27
End: 2017-05-26
Attending: PHYSICIAN ASSISTANT
Payer: COMMERCIAL

## 2017-05-26 DIAGNOSIS — F41.0 PANIC DISORDER WITHOUT AGORAPHOBIA: ICD-10-CM

## 2017-05-26 DIAGNOSIS — F41.9 ANXIETY: ICD-10-CM

## 2017-05-26 DIAGNOSIS — E78.5 HYPERLIPIDEMIA, UNSPECIFIED HYPERLIPIDEMIA TYPE: ICD-10-CM

## 2017-05-26 DIAGNOSIS — E55.9 VITAMIN D DEFICIENCY: ICD-10-CM

## 2017-05-26 LAB
25(OH)D3 SERPL-MCNC: 9 NG/ML (ref 30–100)
CHOLEST SERPL-MCNC: 187 MG/DL (ref 100–199)
HDLC SERPL-MCNC: 46 MG/DL
LDLC SERPL CALC-MCNC: 114 MG/DL
TRIGL SERPL-MCNC: 137 MG/DL (ref 0–149)

## 2017-05-26 PROCEDURE — 36415 COLL VENOUS BLD VENIPUNCTURE: CPT

## 2017-05-26 PROCEDURE — 80061 LIPID PANEL: CPT

## 2017-05-26 PROCEDURE — 80307 DRUG TEST PRSMV CHEM ANLYZR: CPT

## 2017-05-26 PROCEDURE — 82306 VITAMIN D 25 HYDROXY: CPT

## 2017-05-30 ENCOUNTER — PATIENT MESSAGE (OUTPATIENT)
Dept: MEDICAL GROUP | Facility: MEDICAL CENTER | Age: 27
End: 2017-05-30

## 2017-05-30 DIAGNOSIS — R12 HEARTBURN: ICD-10-CM

## 2017-05-30 RX ORDER — OMEPRAZOLE 20 MG/1
20 CAPSULE, DELAYED RELEASE ORAL DAILY
Qty: 90 CAP | Refills: 1 | Status: SHIPPED | OUTPATIENT
Start: 2017-05-30 | End: 2017-09-01

## 2017-05-30 NOTE — TELEPHONE ENCOUNTER
From: Elinor Breaux  To: Cyn Guerin PA-C  Sent: 5/30/2017 4:43 PM PDT  Subject: Prescription Question    Received voicemail of cancellation for June 9th appointment and requesting refill of Omeprazole (for heartburn) as I will run out prior to June 9th. Heartburn is severe and will present that to Cyn Guerin at next appointment.

## 2017-05-31 ENCOUNTER — TELEPHONE (OUTPATIENT)
Dept: MEDICAL GROUP | Facility: MEDICAL CENTER | Age: 27
End: 2017-05-31

## 2017-05-31 PROBLEM — E78.5 HYPERLIPIDEMIA: Status: ACTIVE | Noted: 2017-05-31

## 2017-05-31 LAB
6MAM UR QL: NOT DETECTED
7AMINOCLONAZEPAM UR QL: NOT DETECTED
A-OH ALPRAZ UR QL: NOT DETECTED
ALPRAZ UR QL: NOT DETECTED
AMPHET UR QL SCN: NOT DETECTED
ANNOTATION COMMENT IMP: NORMAL
ANNOTATION COMMENT IMP: NORMAL
BARBITURATES UR QL: NOT DETECTED
BUPRENORPHINE UR QL: NOT DETECTED
BZE UR QL: NOT DETECTED
CARBOXYTHC UR QL: NOT DETECTED
CARISOPRODOL UR QL: NOT DETECTED
CLONAZEPAM UR QL: NOT DETECTED
CODEINE UR QL: NOT DETECTED
DIAZEPAM UR QL: NOT DETECTED
ETHYL GLUCURONIDE UR QL: NOT DETECTED
FENTANYL UR QL: NOT DETECTED
HYDROCODONE UR QL: NOT DETECTED
HYDROMORPHONE UR QL: NOT DETECTED
LORAZEPAM UR QL: NOT DETECTED
MDA UR QL: NOT DETECTED
MDEA UR QL: NOT DETECTED
MDMA UR QL: NOT DETECTED
MEPERIDINE UR QL: NOT DETECTED
METHADONE UR QL: NOT DETECTED
METHAMPHET UR QL: NOT DETECTED
MIDAZOLAM UR QL SCN: NOT DETECTED
MORPHINE UR QL: NOT DETECTED
NORBUPRENORPHINE UR QL CFM: NOT DETECTED
NORDIAZEPAM UR QL: NOT DETECTED
NORFENTANYL UR QL: NOT DETECTED
NORHYDROCODONE UR QL CFM: NOT DETECTED
NOROXYCODONE UR QL CFM: NOT DETECTED
NOROXYMORPH CO100 Q0458: NOT DETECTED
OXAZEPAM UR QL: NOT DETECTED
OXYCODONE UR QL: NOT DETECTED
OXYMORPHONE UR QL: NOT DETECTED
PATHOLOGY STUDY: NORMAL
PCP UR QL: NOT DETECTED
PHENTERMINE UR QL: NOT DETECTED
PPAA UR QL: NOT DETECTED
PROPOXYPH UR QL: NOT DETECTED
SERVICE CMNT-IMP: NORMAL
TAPENADOL OSULF CO200 Q0473: NOT DETECTED
TAPENTADOL UR QL SCN: NOT DETECTED
TEMAZEPAM UR QL: NOT DETECTED
TRAMADOL UR QL: NOT DETECTED
ZOLPIDEM UR QL: NOT DETECTED

## 2017-05-31 NOTE — TELEPHONE ENCOUNTER
----- Message from Cyn Guerin PA-C sent at 5/31/2017  6:55 AM PDT -----  Please inform pt that her vit D is back to being way down again and we should do another 3 mo prescription vit D 50,000iu once weekly and then recheck in 3 mo and switch to OTC vit D depending on lab.   Also her cholesterol is slightly elevated, LDL (bad cholesterol) is 114 and we like this to be under 100.   Would recommend increasing exercise, fiber, veggies, fruits, nuts and oats, limiting the amount of simple carbohydrates like breads, pasta, rice.   Thank you  Cyn

## 2017-05-31 NOTE — TELEPHONE ENCOUNTER
Regarding: Prescription Question  Contact: [phone]  ----- Message from Arnold Martínez Ass't sent at 5/30/2017  4:59 PM PDT -----       ----- Message from Elinor Breaux to Cyn Guerin PA-C sent at 5/30/2017  4:43 PM -----   Received voicemail of cancellation for June 9th appointment and requesting refill of Omeprazole (for heartburn) as I will run out prior to June 9th.  Heartburn is severe and will present that to Cyn Guerin at next appointment.

## 2017-06-15 ENCOUNTER — TELEPHONE (OUTPATIENT)
Dept: MEDICAL GROUP | Facility: MEDICAL CENTER | Age: 27
End: 2017-06-15

## 2017-06-15 NOTE — TELEPHONE ENCOUNTER
ESTABLISHED PATIENT PRE-VISIT PLANNING     Note: Patient will not be contacted if there is no indication to call.     1.  Reviewed notes from the last few office visits within the medical group: Yes    2.  If any orders were placed at last visit or intended to be done for this visit (i.e. 6 mos follow-up), do we have Results/Consult Notes?        •  Labs - Labs ordered, completed and results are in chart.       •  Imaging - Imaging was not ordered at last office visit.       •  Referrals - No referrals were ordered at last office visit.    3. Is this appointment scheduled as a Hospital Follow-Up? No    4.  Immunizations were updated in [employer] using WebIZ?: Yes       •  Web Iz Recommendations: FLU HEPATITIS A  MMR  TD VARICELLA (Chicken Pox)     5.  Patient is due for the following Health Maintenance Topics:   Health Maintenance Due   Topic Date Due   • PAP SMEAR  06/02/2009           6.  Patient was NOT informed to arrive 15 min prior to their scheduled appointment and bring in their medication bottles.

## 2017-06-16 ENCOUNTER — APPOINTMENT (OUTPATIENT)
Dept: MEDICAL GROUP | Facility: MEDICAL CENTER | Age: 27
End: 2017-06-16
Payer: COMMERCIAL

## 2017-06-23 ENCOUNTER — OFFICE VISIT (OUTPATIENT)
Dept: PEDIATRICS | Facility: CLINIC | Age: 27
End: 2017-06-23
Payer: COMMERCIAL

## 2017-06-23 DIAGNOSIS — F41.0 PANIC DISORDER WITHOUT AGORAPHOBIA: ICD-10-CM

## 2017-06-23 DIAGNOSIS — F40.248 SITUATIONAL PHOBIA: ICD-10-CM

## 2017-06-23 PROCEDURE — 90834 PSYTX W PT 45 MINUTES: CPT | Performed by: PSYCHOLOGIST

## 2017-06-23 NOTE — MR AVS SNAPSHOT
Elinor Breaux   2017 3:00 PM   Office Visit   MRN: [id number]    Department:  [facility] Med - Pediatrics   Dept Phone:  [phone]    Description:  Female : 1990   Provider:  Cleo Talbert P.H.D.           Reason for Visit     Anxiety           Allergies as of 2017     Allergen Noted Reactions    Iodine 2017   Anaphylaxis    Seafood allergy     Tree Nuts Food Allergy 2016   Anaphylaxis    Peanut allergy      You were diagnosed with     Panic disorder without agoraphobia   [300.01.ICD-9-CM]       Situational phobia   [552145]         Vital Signs     Smoking Status                   Never Smoker            Basic Information     Date Of Birth Sex Race Ethnicity Preferred Language    1990 Female White  Origin ([place]ian,Citizen of [place],Citizen of [place], etc) English      Your appointments     2017  1:00 PM   ANNUAL EXAM PREVENTATIVE with Cyn Guerin PA-C   [facility]    [address]  [address]  [facility] [address] [phone]            2017  3:00 PM   Individual Therapy with Cleo Talbert P.H.D.   [address] (--)    [address] [facility], [address]  [place] [address]   [phone]            2017  3:00 PM   Individual Therapy with Cleo Talbert P.H.D.   [address] (--)    [address], [address]  [facility] [address]   [phone]              Problem List              ICD-10-CM Priority Class Noted - Resolved    Multiple food allergies Z91.018   2016 - Present    Preventative health care Z00.00   2016 - Present    Obesity (BMI 35.0-39.9 without comorbidity) ([facility]) E66.9   2016 - Present    Heartburn R12   10/14/2016 - Present    Vitamin D deficiency E55.9   10/14/2016 - Present    Panic disorder without agoraphobia F41.0   10/21/2016 - Present    Situational phobia F40.248   10/21/2016 -  Present    Morbid obesity with BMI of 40.0-44.9, adult ([facility]) E66.01, Z68.41   2/21/2017 - Present    Hyperlipidemia E78.5   5/31/2017 - Present      Health Maintenance        Date Due Completion Dates    PAP SMEAR 6/2/2009 ---    IMM DTaP/Tdap/Td Vaccine (2 - Td) 5/10/2023 5/10/2013            Current Immunizations     Tdap Vaccine 5/10/2013      Below and/or attached are the medications your provider expects you to take. Review all of your home medications and newly ordered medications with your provider and/or pharmacist. Follow medication instructions as directed by your provider and/or pharmacist. Please keep your medication list with you and share with your provider. Update the information when medications are discontinued, doses are changed, or new medications (including over-the-counter products) are added; and carry medication information at all times in the event of emergency situations     Allergies:  IODINE - Anaphylaxis     TREE NUTS FOOD ALLERGY - Anaphylaxis               Medications  Valid as of: June 23, 2017 -  4:02 PM    Generic Name Brand Name Tablet Size Instructions for use    ALPRAZolam (Tab) XANAX 0.5 MG Take 0.5 Tabs by mouth 3 times a day as needed for Sleep.        EPINEPHrine   by Injection route.        Ergocalciferol (Cap) DRISDOL 81515 UNIT Take 1 Cap by mouth every 7 days.        Omeprazole (CAPSULE DELAYED RELEASE) PRILOSEC 20 MG Take 1 Cap by mouth every day.        .                 Medicines prescribed today were sent to:     [facility] [id number] [place] [address] [facility] [address]    [address] [facility] [address]    Phone: [phone] Fax: [phone]    Open 24 Hours?: No      Medication refill instructions:       If your prescription bottle indicates you have medication refills left, it is not necessary to call your provider’s office. Please contact your pharmacy and they will refill your medication.    If your prescription bottle indicates  you do not have any refills left, you may request refills at any time through one of the following ways: The online Xi3 system (except Urgent Care), by calling your provider’s office, or by asking your pharmacy to contact your provider’s office with a refill request. Medication refills are processed only during regular business hours and may not be available until the next business day. Your provider may request additional information or to have a follow-up visit with you prior to refilling your medication.   *Please Note: Medication refills are assigned a new Rx number when refilled electronically. Your pharmacy may indicate that no refills were authorized even though a new prescription for the same medication is available at the pharmacy. Please request the medicine by name with the pharmacy before contacting your provider for a refill.           Xi3 Access Code: Activation code not generated  Current Xi3 Status: Active

## 2017-06-23 NOTE — PROGRESS NOTES
"      Note Title:  Individual Outpatient Psychotherapy Progress Note      Name:  Elinor Breaux  MRN:  [id number]  :  1990  Age:  26 y.o.    PCP:  Cyn Guerin PA-C    Service Rendered:  Individual Psychotherapy  Date of Service:  2017  Length of Service:  45 minutes    Persons in Attendence: Elinor    Interim History:  Pt reported continued improvements in her anxiety and how she is coping with it. Reported her mood as generally good. Denied having any panic attacks in the past few months. Pt reported several incidents where she experieced less anxiety and engaged less in catastrophic thinking than she would have before. She also reported noticing more flexible thinking and more comfort with uncertainty than in the past. Stated she feels anxious, but keeps herself much more grounded. Engaged in mindfulness of the present moment. Pt identified feeling calm and her mind wandering. Engaged in values clarification/action, especially as related to career. Pt will continue mindfulness practice on own.     Diagnostic Impressions:    1. Panic disorder without agoraphobia    2. Situational phobia      Mental Status Exam:   Appearance:  Well groomed, good hygiene, appears stated age.   Behavior:  Pleasant, sociable, tearful.    Mood:  \"stressed,” slightly anxious/depressed.   Affect:  Appropriate to mood, constricted range.   Speech/Language:  Normal rate, rhythm, and tone.   Sensorium:  Alert and oriented to person, place, time, and situation.   Memory and Cognition:  Within normal limits, no evidence of gross cognitive, intellectual, or memory impairments   Thought Process/Thought Content:  Logical, linear, goal directed. Reality testing appears intact.    Insight/Judgment: Good.      Risk Assessment:  Elinor denied concerns regarding risk to self or others.       Treatment Recommendations and Plan:    Will continue individual therapy utilizing ACT approach to anxiety treatment. Will see pt back in 2 weeks. " Will titrate back to monthly apts in Aug.     Ashli may benefit from a mindfulness group practice and/or yoga to continue with the generalization of positive tx outcomes.    The above diagnostic impressions, recommendations, and treatment plan were discussed with and agreed upon by Elinor. Care will be coordinated with Elinor’s healthcare team, as appropriate.      Cleo Talbert, PhD  Licensed Psychologist  [facility]

## 2017-07-07 ENCOUNTER — HOSPITAL ENCOUNTER (OUTPATIENT)
Facility: MEDICAL CENTER | Age: 27
End: 2017-07-07
Attending: PHYSICIAN ASSISTANT
Payer: COMMERCIAL

## 2017-07-07 ENCOUNTER — OFFICE VISIT (OUTPATIENT)
Dept: MEDICAL GROUP | Facility: MEDICAL CENTER | Age: 27
End: 2017-07-07
Payer: COMMERCIAL

## 2017-07-07 VITALS
HEART RATE: 104 BPM | SYSTOLIC BLOOD PRESSURE: 128 MMHG | TEMPERATURE: 99.4 F | DIASTOLIC BLOOD PRESSURE: 74 MMHG | WEIGHT: 203 LBS | OXYGEN SATURATION: 95 % | HEIGHT: 60 IN | BODY MASS INDEX: 39.85 KG/M2

## 2017-07-07 DIAGNOSIS — Z12.4 SCREENING FOR CERVICAL CANCER: ICD-10-CM

## 2017-07-07 DIAGNOSIS — Z01.419 ROUTINE GYNECOLOGICAL EXAMINATION: ICD-10-CM

## 2017-07-07 DIAGNOSIS — Z01.419 ROUTINE GYNECOLOGICAL EXAMINATION: Primary | ICD-10-CM

## 2017-07-07 DIAGNOSIS — E55.9 VITAMIN D DEFICIENCY DISEASE: ICD-10-CM

## 2017-07-07 PROCEDURE — 88175 CYTOPATH C/V AUTO FLUID REDO: CPT

## 2017-07-07 PROCEDURE — 87491 CHLMYD TRACH DNA AMP PROBE: CPT

## 2017-07-07 PROCEDURE — 99395 PREV VISIT EST AGE 18-39: CPT | Performed by: PHYSICIAN ASSISTANT

## 2017-07-07 PROCEDURE — 87591 N.GONORRHOEAE DNA AMP PROB: CPT

## 2017-07-07 RX ORDER — ERGOCALCIFEROL 1.25 MG/1
50000 CAPSULE ORAL
Qty: 12 CAP | Refills: 0 | Status: SHIPPED | OUTPATIENT
Start: 2017-07-07 | End: 2017-09-01

## 2017-07-07 NOTE — MR AVS SNAPSHOT
Elinor Breaux   2017 1:00 PM   Office Visit   MRN: [id number]    Department:  [place] [address]   Dept Phone:  [phone]    Description:  Female : 1990   Provider:  Cyn Guerin PA-C           Reason for Visit     Gynecologic Exam           Allergies as of 2017     Allergen Noted Reactions    Iodine 2017   Anaphylaxis    Seafood allergy     Tree Nuts Food Allergy 2016   Anaphylaxis    Peanut allergy      You were diagnosed with     Routine gynecological examination   [V72.31.ICD-9-CM]  -  Primary     Screening for cervical cancer   [736054]       Vitamin D deficiency disease   [069730]         Vital Signs     Blood Pressure Pulse Temperature Height Weight Body Mass Index    128/74 mmHg 104 37.4 °C (99.4 °F) 1.524 m (5') 92.08 kg (203 lb) 39.65 kg/m2    Oxygen Saturation Last Menstrual Period Breastfeeding? Smoking Status          95% 2017 No Never Smoker         Basic Information     Date Of Birth Sex Race Ethnicity Preferred Language    1990 Female White  Origin ([place]an,Brazilian,[place]ari,Burkinan, etc) English      Your appointments     2017  3:00 PM   Individual Therapy with Cleo Talbert P.H.D.   [address] (--)    [facility]  [facility] [address]   [phone]            2017  3:00 PM   Individual Therapy with Cleo Talbert P.H.D.   [address] (--)    [address] [facility]  [facility] [address]   [phone]            Aug 18, 2017  2:00 PM   Individual Therapy with JEANNETTE InterianoHCRUZITO   [address] (--)    [address] [facility]  [facility] [address]   [phone]              Problem List              ICD-10-CM Priority Class Noted - Resolved    Multiple food allergies Z91.018   2016 - Present    Preventative health care Z00.00   2016 - Present    Obesity (BMI 35.0-39.9  without comorbidity) ([facility]) E66.9   8/12/2016 - Present    Heartburn R12   10/14/2016 - Present    Vitamin D deficiency E55.9   10/14/2016 - Present    Panic disorder without agoraphobia F41.0   10/21/2016 - Present    Situational phobia F40.248   10/21/2016 - Present    Morbid obesity with BMI of 40.0-44.9, adult ([facility]) E66.01, Z68.41   2/21/2017 - Present    Hyperlipidemia E78.5   5/31/2017 - Present      Health Maintenance        Date Due Completion Dates    PAP SMEAR 6/2/2009 ---    IMM INFLUENZA (1) 9/1/2017 ---    IMM DTaP/Tdap/Td Vaccine (2 - Td) 5/10/2023 5/10/2013            Current Immunizations     Tdap Vaccine 5/10/2013      Below and/or attached are the medications your provider expects you to take. Review all of your home medications and newly ordered medications with your provider and/or pharmacist. Follow medication instructions as directed by your provider and/or pharmacist. Please keep your medication list with you and share with your provider. Update the information when medications are discontinued, doses are changed, or new medications (including over-the-counter products) are added; and carry medication information at all times in the event of emergency situations     Allergies:  IODINE - Anaphylaxis     TREE NUTS FOOD ALLERGY - Anaphylaxis               Medications  Valid as of: July 07, 2017 -  1:57 PM    Generic Name Brand Name Tablet Size Instructions for use    ALPRAZolam (Tab) XANAX 0.5 MG Take 0.5 Tabs by mouth 3 times a day as needed for Sleep.        EPINEPHrine   by Injection route.        Ergocalciferol (Cap) DRISDOL 07579 UNIT Take 1 Cap by mouth every 7 days.        Omeprazole (CAPSULE DELAYED RELEASE) PRILOSEC 20 MG Take 1 Cap by mouth every day.        .                 Medicines prescribed today were sent to:     [id number]  [address] [facility] [address] [facility] [address]    [address] [address]    Phone: [phone] Fax: [phone]     Open 24 Hours?: No      Medication refill instructions:       If your prescription bottle indicates you have medication refills left, it is not necessary to call your provider’s office. Please contact your pharmacy and they will refill your medication.    If your prescription bottle indicates you do not have any refills left, you may request refills at any time through one of the following ways: The online GLOG system (except Urgent Care), by calling your provider’s office, or by asking your pharmacy to contact your provider’s office with a refill request. Medication refills are processed only during regular business hours and may not be available until the next business day. Your provider may request additional information or to have a follow-up visit with you prior to refilling your medication.   *Please Note: Medication refills are assigned a new Rx number when refilled electronically. Your pharmacy may indicate that no refills were authorized even though a new prescription for the same medication is available at the pharmacy. Please request the medicine by name with the pharmacy before contacting your provider for a refill.        Your To Do List     Future Labs/Procedures Complete By Expires    THINPREP RFLX HPV ASCUS W/CTNG  As directed 7/8/2018    VITAMIN D,25 HYDROXY  As directed 7/8/2018         GLOG Access Code: Activation code not generated  Current GLOG Status: Active

## 2017-07-07 NOTE — PROGRESS NOTES
SUBJECTIVE: 27 y.o. female for annual routine gynecologic exam  Chief Complaint   Patient presents with   • Gynecologic Exam       Obstetric History       T0      TAB0   SAB0   E0   M0   L0       Last Pap: 4 years ago, normal   No sexual active since then.    History   Sexual Activity   • Sexual Activity:   • Partners: Male     No complaints today but looking at her labs.   17 vit D 9.   She has taken vit D 50,000iu once weekly before and is currently taking vit D3 1,000iu occasionally.     H/O Abnormal Pap no  She  reports that she has never smoked. She has never used smokeless tobacco.    LMP Date: 17, 4-5 days, heaviest on 2nd day. Monthly. Regular.    Allergies: Iodine and Tree nuts food allergy     ROS:    Menses every month with 4-5 days  Bleeding.   Cramping is mild to moderate  She does take OTC analgesics for cramping  No significant bloating/fluid retention, pelvic pain, or dyspareunia. No vaginal discharge   No breast tenderness, mass, nipple discharge, changes in size or contour, or abnormal cyclic discomfort.  Reports no menopause symptoms of hot flashes, night sweats, sleep disruption, mood changes.Denies vaginal dryness.   No urinary tract symptoms, no incontinence.   No abdominal pain, change in bowel habits, black or bloody stools.    No unusual headaches, no visual changes, menstrual migraines   No prolonged cough. No dyspnea or chest pain on exertion.  No depression, labile mood, anxiety, libido changes, insomnia.  No polydipsia, polyuria, temperature intolerance.  No new/concerning skin lesions, concerns.     Exercise: moderate regular exercise program monthly boot camp. Mon-Thurs and sat.   Preventive Care:  Pap- last was 4 years ago,   TDap- 5/10/13     Current medicines (including changes today)  Current Outpatient Prescriptions   Medication Sig Dispense Refill   • omeprazole (PRILOSEC) 20 MG delayed-release capsule Take 1 Cap by mouth every day. 90 Cap 1   •  alprazolam (XANAX) 0.5 MG Tab Take 0.5 Tabs by mouth 3 times a day as needed for Sleep. 20 Tab 0   • ergocalciferol (DRISDOL) 42037 UNIT capsule Take 1 Cap by mouth every 7 days. 12 Cap 0   • EPINEPHrine (EPIPEN INJ) by Injection route.       No current facility-administered medications for this visit.     She  has a past medical history of Anxiety; Asthma; and Allergy.  She  has no past surgical history on file.     Family History:   Family History   Problem Relation Age of Onset   • Hypertension Mother    • Anxiety disorder Mother    • Alcohol abuse Mother    • Diabetes Father    • Anxiety disorder Maternal Aunt    • Drug abuse Maternal Aunt    • Anxiety disorder Maternal Uncle           OBJECTIVE:   /74 mmHg  Pulse 104  Temp(Src) 37.4 °C (99.4 °F)  Ht 1.524 m (5')  Wt 92.08 kg (203 lb)  BMI 39.65 kg/m2  SpO2 95%  LMP 06/12/2017  Breastfeeding? No  Body mass index is 39.65 kg/(m^2).    HEAD AND NECK:  Ears normal.  Throat, oral cavity and tongue normal.  Neck supple. No adenopathy or masses in the neck or supraclavicular regions.  No carotid bruits. No thyromegaly. NEURO: Cranial nerves are normal. DTR's normal and symmetric.  CHEST:  Clear, good air entry, no wheezes or rales. HEART:  Regular rate and rhythm.  S1 and S2 normal.  No edema or JVD. ABDOMEN:  Soft without tenderness, guarding, mass or organomegaly.  No CVA tenderness or inguinal adenopathy. EXTREMITIES:  Extremities, reflexes and peripheral pulses are normal. SKIN: color normal, vascularity normal, no edema, temperature normal   No rashes or suspicious skin lesions noted.     Breast Exam: Performed with instruction during examination. No axillary lymphadenopathy, no skin changes, no dominant masses. No nipple retraction  Pelvic Exam -  Normal external genitalia with no lesions. Normal vaginal mucosa with normal rugation and scant discharge. Cervix with no visible lesions. No cervical motion tenderness. Uterus is normal sized with no  masses. No adnexal tenderness or enlargement appreciated. Thin Prep Pap is obtained, vaginal swab is not obtained and specimen(s) sent to lab    <ASSESSMENT and PLAN>    1. Routine gynecological examination  Pap smear with HPV cotesting collected today    2. Screening for cervical cancer  Pap smear with HPV cotesting collected today  Continue with STD protection when sexually active.     3. Vit D def.   Deficient, ordered another round of vit D 50,000iu once weekly, recheck labs in 3 mo. Then may need one more round or switch to otc Vit D3 daily.       Discussed  breast self exam, STD prevention, HIV risk factors and prevention, feminine hygiene, adequate intake of calcium and vitamin D, diet and exercise   Follow-up in 1 years for next Gyn exam and 3 years for next Pap.   Next office visit for recheck of chronic medical conditions is due in  1 year    Please note that this dictation was created using voice recognition software. I have made every reasonable attempt to correct obvious errors, but I expect that there are errors of grammar and possibly content that I did not discover before finalizing the note.

## 2017-07-08 LAB
C TRACH DNA GENITAL QL NAA+PROBE: NEGATIVE
CYTOLOGY REG CYTOL: NORMAL
N GONORRHOEA DNA GENITAL QL NAA+PROBE: NEGATIVE
SPECIMEN SOURCE: NORMAL

## 2017-07-12 ENCOUNTER — OFFICE VISIT (OUTPATIENT)
Dept: URGENT CARE | Facility: PHYSICIAN GROUP | Age: 27
End: 2017-07-12
Payer: COMMERCIAL

## 2017-07-12 ENCOUNTER — TELEPHONE (OUTPATIENT)
Dept: MEDICAL GROUP | Facility: MEDICAL CENTER | Age: 27
End: 2017-07-12

## 2017-07-12 VITALS
TEMPERATURE: 97.9 F | HEIGHT: 60 IN | SYSTOLIC BLOOD PRESSURE: 122 MMHG | RESPIRATION RATE: 20 BRPM | OXYGEN SATURATION: 98 % | DIASTOLIC BLOOD PRESSURE: 78 MMHG | WEIGHT: 195 LBS | BODY MASS INDEX: 38.28 KG/M2 | HEART RATE: 88 BPM

## 2017-07-12 DIAGNOSIS — K64.5 EXTERNAL THROMBOSED HEMORRHOIDS: ICD-10-CM

## 2017-07-12 PROCEDURE — 46083 INC THROMBOSED HROID XTRNL: CPT | Performed by: EMERGENCY MEDICINE

## 2017-07-12 RX ORDER — HYDROCODONE BITARTRATE AND ACETAMINOPHEN 5; 325 MG/1; MG/1
1-2 TABLET ORAL EVERY 6 HOURS PRN
Qty: 10 TAB | Refills: 0 | Status: SHIPPED | OUTPATIENT
Start: 2017-07-12 | End: 2017-09-01

## 2017-07-12 ASSESSMENT — ENCOUNTER SYMPTOMS
FEVER: 0
ABDOMINAL PAIN: 0
VOMITING: 0
CONSTIPATION: 0
CHANGE IN BOWEL HABIT: 0
DIARRHEA: 0
BLOOD IN STOOL: 0
NAUSEA: 0

## 2017-07-12 NOTE — PATIENT INSTRUCTIONS
Consider using Colace daily for a stool softener until resolved. Consider using Tucks pads for cleansing of the area as needed. Use prescription cortisone until swelling resolves, use Norco only if needed for pain relief.    Hemorrhoids  Hemorrhoids are puffy (swollen) veins around the rectum or anus. Hemorrhoids can cause pain, itching, bleeding, or irritation.  HOME CARE  · Eat foods with fiber, such as whole grains, beans, nuts, fruits, and vegetables. Ask your doctor about taking products with added fiber in them (fiber supplements).   · Drink enough fluid to keep your pee (urine) clear or pale yellow.  · Exercise often.  · Go to the bathroom when you have the urge to poop. Do not wait.  · Avoid straining to poop (bowel movement).  · Keep the butt area dry and clean. Use wet toilet paper or moist paper towels.  · Medicated creams and medicine inserted into the anus (anal suppository) may be used or applied as told.  · Only take medicine as told by your doctor.  · Take a warm water bath (sitz bath) for 15-20 minutes to ease pain. Do this 3-4 times a day.  · Place ice packs on the area if it is tender or puffy. Use the ice packs between the warm water baths.  ¨ Put ice in a plastic bag.  ¨ Place a towel between your skin and the bag.  ¨ Leave the ice on for 15-20 minutes, 03-04 times a day.  · Do not use a donut-shaped pillow or sit on the toilet for a long time.  GET HELP RIGHT AWAY IF:   · You have more pain that is not controlled by treatment or medicine.  · You have bleeding that will not stop.  · You have trouble or are unable to poop (bowel movement).  · You have pain or puffiness outside the area of the hemorrhoids.  MAKE SURE YOU:   · Understand these instructions.  · Will watch your condition.  · Will get help right away if you are not doing well or get worse.     This information is not intended to replace advice given to you by your health care provider. Make sure you discuss any questions you have with  your health care provider.     Document Released: 09/26/2009 Document Revised: 12/04/2013 Document Reviewed: 10/29/2013  Elsevier Interactive Patient Education ©2016 [employer]

## 2017-07-12 NOTE — Clinical Note
July 12, 2017       Patient: Elinor Breaux   YOB: 1990   Date of Visit: 7/12/2017         To Whom It May Concern:    It is my medical opinion that Elinor Breaux may not attend work today or tomorrow.      Sincerely,          Luis Alberto Gee M.D.  Electronically Signed

## 2017-07-12 NOTE — Clinical Note
July 13, 2017         Patient: Elinor Breaux   YOB: 1990   Date of Visit: 7/12/2017           To Whom it May Concern:    Elinor Breaux was seen in my clinic on 7/12/2017. She may return to work on 7/15/2017.    If you have any questions or concerns, please don't hesitate to call.        Sincerely,           Luis Alberto Gee M.D.  Electronically Signed

## 2017-07-12 NOTE — TELEPHONE ENCOUNTER
----- Message from Cyn Guerin PA-C sent at 7/12/2017  9:04 AM PDT -----  Please inform patient of her Pap was normal  Recommend annual visits and Pap in 3 years.  Thank you  Cyn

## 2017-07-12 NOTE — MR AVS SNAPSHOT
Elinor Breaux   2017 11:15 AM   Office Visit   MRN: [id number]    Department:  [facility]   Dept Phone:  [phone]    Description:  Female : 1990   Provider:  Luis Alberto Gee M.D.           Reason for Visit     Hemorrhoids  X 4 days      Allergies as of 2017     Allergen Noted Reactions    Iodine 2017   Anaphylaxis    Seafood allergy     Tree Nuts Food Allergy 2016   Anaphylaxis    Peanut allergy      You were diagnosed with     External thrombosed hemorrhoids   [455.4.ICD-9-CM]         Vital Signs     Blood Pressure Pulse Temperature Respirations Height Weight    122/78 mmHg 88 36.6 °C (97.9 °F) 20 1.524 m (5') 88.451 kg (195 lb)    Body Mass Index Oxygen Saturation Last Menstrual Period Breastfeeding? Smoking Status       38.08 kg/m2 98% 07/10/2017 No Never Smoker        Basic Information     Date Of Birth Sex Race Ethnicity Preferred Language    1990 Female White  Origin (Swedish,Beninese,Ugandan,Sharan, etc) English      Your appointments     2017  3:00 PM   Individual Therapy with Cleo Talbert P.H.D.   [address] (--)    [address] [place] [address], [address]  [facility] [address]   [phone]            2017  3:00 PM   Individual Therapy with Cleo Talbert P.H.D.   [address] (--)    [address] [place] [address], [address]  [place] [address]   [phone]            Aug 18, 2017  2:00 PM   Individual Therapy with Cleo Talbert P.H.D.   [address] (--)    [address] [place] [address], [address]  [facility] [address]   [phone]              Problem List              ICD-10-CM Priority Class Noted - Resolved    Multiple food allergies Z91.018   2016 - Present    Preventative health care Z00.00   2016 - Present    Obesity (BMI 35.0-39.9 without comorbidity) ([facility]) E66.9   2016 - Present    Heartburn R12   10/14/2016 - Present       Vitamin D deficiency E55.9   10/14/2016 - Present    Panic disorder without agoraphobia F41.0   10/21/2016 - Present    Situational phobia F40.248   10/21/2016 - Present    Morbid obesity with BMI of 40.0-44.9, adult (HCC) E66.01, Z68.41   2/21/2017 - Present    Hyperlipidemia E78.5   5/31/2017 - Present      Health Maintenance        Date Due Completion Dates    IMM INFLUENZA (1) 9/1/2017 ---    PAP SMEAR 7/7/2020 7/7/2017    IMM DTaP/Tdap/Td Vaccine (2 - Td) 5/10/2023 5/10/2013            Current Immunizations     Tdap Vaccine 5/10/2013      Below and/or attached are the medications your provider expects you to take. Review all of your home medications and newly ordered medications with your provider and/or pharmacist. Follow medication instructions as directed by your provider and/or pharmacist. Please keep your medication list with you and share with your provider. Update the information when medications are discontinued, doses are changed, or new medications (including over-the-counter products) are added; and carry medication information at all times in the event of emergency situations     Allergies:  IODINE - Anaphylaxis     TREE NUTS FOOD ALLERGY - Anaphylaxis               Medications  Valid as of: July 12, 2017 - 12:10 PM    Generic Name Brand Name Tablet Size Instructions for use    ALPRAZolam (Tab) XANAX 0.5 MG Take 0.5 Tabs by mouth 3 times a day as needed for Sleep.        EPINEPHrine   by Injection route.        Ergocalciferol (Cap) DRISDOL 19648 UNIT Take 1 Cap by mouth every 7 days.        Hydrocodone-Acetaminophen (Tab) NORCO 5-325 MG Take 1-2 Tabs by mouth every 6 hours as needed.        Hydrocortisone (Cream) PROCTOZONE HC 2.5 % Insert 1 Applicatorful in rectum 2 times a day.        Omeprazole (CAPSULE DELAYED RELEASE) PRILOSEC 20 MG Take 1 Cap by mouth every day.        .                 Medicines prescribed today were sent to:     [facility] [facility], [place] - [address] [facility] AT  [facility] [address]    [address] [facility] [address]    Phone: [phone] Fax: [phone]    Open 24 Hours?: No      Medication refill instructions:       If your prescription bottle indicates you have medication refills left, it is not necessary to call your provider’s office. Please contact your pharmacy and they will refill your medication.    If your prescription bottle indicates you do not have any refills left, you may request refills at any time through one of the following ways: The online [employer] system (except Urgent Care), by calling your provider’s office, or by asking your pharmacy to contact your provider’s office with a refill request. Medication refills are processed only during regular business hours and may not be available until the next business day. Your provider may request additional information or to have a follow-up visit with you prior to refilling your medication.   *Please Note: Medication refills are assigned a new Rx number when refilled electronically. Your pharmacy may indicate that no refills were authorized even though a new prescription for the same medication is available at the pharmacy. Please request the medicine by name with the pharmacy before contacting your provider for a refill.        Instructions    Consider using Colace daily for a stool softener until resolved. Consider using Tucks pads for cleansing of the area as needed. Use prescription cortisone until swelling resolves, use Norco only if needed for pain relief.    Hemorrhoids  Hemorrhoids are puffy (swollen) veins around the rectum or anus. Hemorrhoids can cause pain, itching, bleeding, or irritation.  HOME CARE  · Eat foods with fiber, such as whole grains, beans, nuts, fruits, and vegetables. Ask your doctor about taking products with added fiber in them (fiber supplements).   · Drink enough fluid to keep your pee (urine) clear or pale yellow.  · Exercise often.  · Go to the bathroom when you have  the urge to poop. Do not wait.  · Avoid straining to poop (bowel movement).  · Keep the butt area dry and clean. Use wet toilet paper or moist paper towels.  · Medicated creams and medicine inserted into the anus (anal suppository) may be used or applied as told.  · Only take medicine as told by your doctor.  · Take a warm water bath (sitz bath) for 15-20 minutes to ease pain. Do this 3-4 times a day.  · Place ice packs on the area if it is tender or puffy. Use the ice packs between the warm water baths.  ¨ Put ice in a plastic bag.  ¨ Place a towel between your skin and the bag.  ¨ Leave the ice on for 15-20 minutes, 03-04 times a day.  · Do not use a donut-shaped pillow or sit on the toilet for a long time.  GET HELP RIGHT AWAY IF:   · You have more pain that is not controlled by treatment or medicine.  · You have bleeding that will not stop.  · You have trouble or are unable to poop (bowel movement).  · You have pain or puffiness outside the area of the hemorrhoids.  MAKE SURE YOU:   · Understand these instructions.  · Will watch your condition.  · Will get help right away if you are not doing well or get worse.     This information is not intended to replace advice given to you by your health care provider. Make sure you discuss any questions you have with your health care provider.     Document Released: 09/26/2009 Document Revised: 12/04/2013 Document Reviewed: 10/29/2013  [employer] Interactive Patient Education ©2016 [employer] Inc.            [employer] Access Code: Activation code not generated  Current comScore Status: Active

## 2017-07-12 NOTE — PROGRESS NOTES
Subjective:      Elinor Breaux is a 27 y.o. female who presents with Hemorrhoids            Hemorrhoids  This is a new problem. Episode onset: 4 days. The problem occurs constantly. The problem has been gradually worsening. Pertinent negatives include no abdominal pain, change in bowel habit, fever, nausea, rash or vomiting. Exacerbated by: bowel movements. Treatments tried: Preparation H. The treatment provided no relief.    onset associated with preceding episode of diarrhea; resolved    Review of Systems   Constitutional: Negative for fever.   Gastrointestinal: Positive for hemorrhoids. Negative for nausea, vomiting, abdominal pain, diarrhea, constipation, blood in stool and change in bowel habit.   Skin: Positive for itching. Negative for rash.     PMH:  has a past medical history of Anxiety; Asthma; and Allergy.  MEDS:   Current outpatient prescriptions:   •  hydrocortisone rectal (PROCTOZONE HC) 2.5 % Cream, Insert 1 Applicatorful in rectum 2 times a day., Disp: 30 g, Rfl: 0  •  hydrocodone-acetaminophen (NORCO) 5-325 MG Tab per tablet, Take 1-2 Tabs by mouth every 6 hours as needed., Disp: 10 Tab, Rfl: 0  •  ergocalciferol (DRISDOL) 78595 UNIT capsule, Take 1 Cap by mouth every 7 days., Disp: 12 Cap, Rfl: 0  •  omeprazole (PRILOSEC) 20 MG delayed-release capsule, Take 1 Cap by mouth every day., Disp: 90 Cap, Rfl: 1  •  alprazolam (XANAX) 0.5 MG Tab, Take 0.5 Tabs by mouth 3 times a day as needed for Sleep., Disp: 20 Tab, Rfl: 0  •  EPINEPHrine (EPIPEN INJ), by Injection route., Disp: , Rfl:   ALLERGIES:   Allergies   Allergen Reactions   • Iodine Anaphylaxis     Seafood allergy    • Tree Nuts Food Allergy Anaphylaxis     Peanut allergy     SURGHX: History reviewed. No pertinent past surgical history.  SOCHX:  reports that she has never smoked. She has never used smokeless tobacco. She reports that she drinks alcohol. She reports that she does not use illicit drugs.  FH: family history includes Alcohol abuse  in her mother; Anxiety disorder in her maternal aunt, maternal uncle, and mother; Diabetes in her father; Drug abuse in her maternal aunt; Hypertension in her mother.         Objective:     /78 mmHg  Pulse 88  Temp(Src) 36.6 °C (97.9 °F)  Resp 20  Ht 1.524 m (5')  Wt 88.451 kg (195 lb)  BMI 38.08 kg/m2  SpO2 98%  LMP 07/10/2017  Breastfeeding? No     Physical Exam   Constitutional: She appears well-developed and well-nourished. She is cooperative. She does not appear ill.   Cardiovascular: Normal rate, regular rhythm and normal heart sounds.    Pulmonary/Chest: Effort normal and breath sounds normal.   Abdominal: Soft. She exhibits no distension. There is no tenderness.   Genitourinary: Rectal exam shows external hemorrhoid and tenderness. Rectal exam shows no fissure and anal tone normal.   2 external hemorrhoids; each approximately 1 x 2 cm. Posterior hemorrhoid thrombosed, minimal bleeding. Right-sided hemorrhoid with minimal thrombosis, no bleeding. No evidence of infection.   Neurological: She is alert.   Skin: Skin is warm and dry.   Psychiatric: She has a normal mood and affect.     Advised patient that thrombosed hemorrhoid not likely to resolve with medication alone; recommended incision and drainage. Patient agreed. Advised with simple incision and drainage that thrombosis may reoccur; needs follow-up if not resolving in 2-3 days.     Procedure: External hemorrhoid incision and drainage.  Area prepped with Hibiclens.  Topical anesthetic with a chloride spray.  Incision over thrombosed site with #11 blade.  Expressed clotted blood.  No significant residual bleeding.  Patient tolerated procedure well.     Assessment/Plan:     1. External thrombosed hemorrhoids  I & D  Advised sitz baths, Tucks pads for cleansing, Colace as needed.  - hydrocortisone rectal (PROCTOZONE HC) 2.5 % Cream; Insert 1 Applicatorful in rectum 2 times a day.  Dispense: 30 g; Refill: 0  - hydrocodone-acetaminophen (NORCO)  5-325 MG Tab per tablet; Take 1-2 Tabs by mouth every 6 hours as needed.  Dispense: 10 Tab; Refill: 0

## 2017-07-28 ENCOUNTER — OFFICE VISIT (OUTPATIENT)
Dept: PEDIATRICS | Facility: CLINIC | Age: 27
End: 2017-07-28
Payer: COMMERCIAL

## 2017-07-28 DIAGNOSIS — F40.248 SITUATIONAL PHOBIA: ICD-10-CM

## 2017-07-28 DIAGNOSIS — F41.0 PANIC DISORDER WITHOUT AGORAPHOBIA: ICD-10-CM

## 2017-07-28 PROCEDURE — 90832 PSYTX W PT 30 MINUTES: CPT | Performed by: PSYCHOLOGIST

## 2017-07-28 NOTE — MR AVS SNAPSHOT
Elinor Breaux   2017 3:00 PM   Appointment   MRN: [id number]    Department:  [facility] - Pediatrics   Dept Phone:  [phone]    Description:  Female : 1990   Provider:  Cleo Talbert, Ph.D.           Allergies as of 2017     Allergen Noted Reactions    Iodine 2017   Anaphylaxis    Seafood allergy     Tree Nuts Food Allergy 2016   Anaphylaxis    Peanut allergy      Vital Signs     Last Menstrual Period Smoking Status                07/10/2017 Never Smoker           Basic Information     Date Of Birth Sex Race Ethnicity Preferred Language    1990 Female White  Origin (Lithuanian,Kuwaiti,Sudanese,Sharan, etc) English      Your appointments     Aug 18, 2017  2:00 PM   Individual Therapy with Cleo Talbert, Ph.D.   [facility] [address] (--)    [address] [place] [address], [address]  [facility] [address]   [phone]              Problem List              ICD-10-CM Priority Class Noted - Resolved    Multiple food allergies Z91.018   2016 - Present    Preventative health care Z00.00   2016 - Present    Obesity (BMI 35.0-39.9 without comorbidity) ([facility]) E66.9   2016 - Present    Heartburn R12   10/14/2016 - Present    Vitamin D deficiency E55.9   10/14/2016 - Present    Panic disorder without agoraphobia F41.0   10/21/2016 - Present    Situational phobia F40.248   10/21/2016 - Present    Morbid obesity with BMI of 40.0-44.9, adult ([facility]) E66.01, Z68.41   2017 - Present    Hyperlipidemia E78.5   2017 - Present      Health Maintenance        Date Due Completion Dates    IMM INFLUENZA (1) 2017 ---    PAP SMEAR 2020    IMM DTaP/Tdap/Td Vaccine (2 - Td) 5/10/2023 5/10/2013            Current Immunizations     Tdap Vaccine 5/10/2013      Below and/or attached are the medications your provider expects you to take. Review all of your home medications and newly ordered medications with your provider and/or pharmacist.  Follow medication instructions as directed by your provider and/or pharmacist. Please keep your medication list with you and share with your provider. Update the information when medications are discontinued, doses are changed, or new medications (including over-the-counter products) are added; and carry medication information at all times in the event of emergency situations     Allergies:  IODINE - Anaphylaxis     TREE NUTS FOOD ALLERGY - Anaphylaxis               Medications  Valid as of: July 28, 2017 -  3:40 PM    Generic Name Brand Name Tablet Size Instructions for use    ALPRAZolam (Tab) XANAX 0.5 MG Take 0.5 Tabs by mouth 3 times a day as needed for Sleep.        EPINEPHrine   by Injection route.        Ergocalciferol (Cap) DRISDOL 56916 UNIT Take 1 Cap by mouth every 7 days.        Hydrocodone-Acetaminophen (Tab) NORCO 5-325 MG Take 1-2 Tabs by mouth every 6 hours as needed.        Hydrocortisone (Cream) PROCTOZONE HC 2.5 % Insert 1 Applicatorful in rectum 2 times a day.        Omeprazole (CAPSULE DELAYED RELEASE) PRILOSEC 20 MG Take 1 Cap by mouth every day.        .                 Medicines prescribed today were sent to:     [email or web address] [address] [place] [address], [place] - [facility] [address] [facility] AT [facility]    [address] [facility] [address]    Phone: [phone] Fax: [phone]    Open 24 Hours?: No      Medication refill instructions:       If your prescription bottle indicates you have medication refills left, it is not necessary to call your provider’s office. Please contact your pharmacy and they will refill your medication.    If your prescription bottle indicates you do not have any refills left, you may request refills at any time through one of the following ways: The online [employer] system (except Urgent Care), by calling your provider’s office, or by asking your pharmacy to contact your provider’s office with a refill request. Medication refills are processed only during regular  business hours and may not be available until the next business day. Your provider may request additional information or to have a follow-up visit with you prior to refilling your medication.   *Please Note: Medication refills are assigned a new Rx number when refilled electronically. Your pharmacy may indicate that no refills were authorized even though a new prescription for the same medication is available at the pharmacy. Please request the medicine by name with the pharmacy before contacting your provider for a refill.           [employer] Access Code: Activation code not generated  Current [employer] Status: Active

## 2017-07-28 NOTE — PROGRESS NOTES
"      Note Title:  Individual Outpatient Psychotherapy Progress Note      Name:  Elinor Breaux  MRN:  [id number]  :  1990  Age:  26 y.o.    PCP:  Cyn Guerin PA-C    Service Rendered:  Individual Psychotherapy  Date of Service:  2017  Length of Service:  35 minutes    Persons in Attendence: Elinor    Interim History:  Pt reported continued improvements in her anxiety and how she is coping with it. Reported her mood as generally good. Denied having any panic attacks in the past few months. Pt reported several incidents where she experieced less anxiety and engaged less in catastrophic thinking than she would have before. She also reported noticing more flexible thinking and more comfort with uncertainty than in the past. Pt also reported that she has continued to make several positive health behavior changes. Engaged in continued values clarification. Pt will continue mindfulness practice on own.     Diagnostic Impressions:    1. Panic disorder without agoraphobia    2. Situational phobia      Mental Status Exam:   Appearance:  Well groomed, good hygiene, appears stated age.   Behavior:  Pleasant, sociable, cooperative.    Mood:  \"good,” stable.    Affect:  Appropriate to mood, constricted range.   Speech/Language:  Normal rate, rhythm, and tone.   Sensorium:  Alert and oriented to person, place, time, and situation.   Memory and Cognition:  Within normal limits, no evidence of gross cognitive, intellectual, or memory impairments   Thought Process/Thought Content:  Logical, linear, goal directed. Reality testing appears intact.    Insight/Judgment: Good.      Risk Assessment:  Elinor denied concerns regarding risk to self or others.       Treatment Recommendations and Plan:    Will continue individual therapy utilizing ACT approach to anxiety treatment. Reduce frequency of sessions to monthly, moving toward termination.    Elinor may benefit from a mindfulness group practice and/or yoga to continue " with the generalization of positive tx outcomes.    The above diagnostic impressions, recommendations, and treatment plan were discussed with and agreed upon by Elinor. Care will be coordinated with Elinor’s healthcare team, as appropriate.      Cleo Talbert, PhD  Licensed Psychologist  [facility]

## 2017-09-01 ENCOUNTER — HOSPITAL ENCOUNTER (EMERGENCY)
Facility: MEDICAL CENTER | Age: 27
End: 2017-09-01
Attending: EMERGENCY MEDICINE
Payer: COMMERCIAL

## 2017-09-01 ENCOUNTER — APPOINTMENT (OUTPATIENT)
Dept: RADIOLOGY | Facility: MEDICAL CENTER | Age: 27
End: 2017-09-01
Attending: EMERGENCY MEDICINE
Payer: COMMERCIAL

## 2017-09-01 VITALS
OXYGEN SATURATION: 95 % | WEIGHT: 196.21 LBS | HEIGHT: 60 IN | HEART RATE: 78 BPM | RESPIRATION RATE: 18 BRPM | TEMPERATURE: 97.7 F | SYSTOLIC BLOOD PRESSURE: 158 MMHG | BODY MASS INDEX: 38.52 KG/M2 | DIASTOLIC BLOOD PRESSURE: 86 MMHG

## 2017-09-01 DIAGNOSIS — K21.9 GASTROESOPHAGEAL REFLUX DISEASE, ESOPHAGITIS PRESENCE NOT SPECIFIED: ICD-10-CM

## 2017-09-01 DIAGNOSIS — K80.20 CALCULUS OF GALLBLADDER WITHOUT CHOLECYSTITIS WITHOUT OBSTRUCTION: ICD-10-CM

## 2017-09-01 DIAGNOSIS — K80.50 BILIARY COLIC: ICD-10-CM

## 2017-09-01 LAB
ALBUMIN SERPL BCP-MCNC: 3.8 G/DL (ref 3.2–4.9)
ALBUMIN/GLOB SERPL: 1.1 G/DL
ALP SERPL-CCNC: 69 U/L (ref 30–99)
ALT SERPL-CCNC: 24 U/L (ref 2–50)
ANION GAP SERPL CALC-SCNC: 7 MMOL/L (ref 0–11.9)
APPEARANCE UR: CLEAR
AST SERPL-CCNC: 25 U/L (ref 12–45)
BASOPHILS # BLD AUTO: 0.4 % (ref 0–1.8)
BASOPHILS # BLD: 0.04 K/UL (ref 0–0.12)
BILIRUB SERPL-MCNC: 0.6 MG/DL (ref 0.1–1.5)
BILIRUB UR QL STRIP.AUTO: NEGATIVE
BUN SERPL-MCNC: 11 MG/DL (ref 8–22)
CALCIUM SERPL-MCNC: 8.8 MG/DL (ref 8.4–10.2)
CHLORIDE SERPL-SCNC: 108 MMOL/L (ref 96–112)
CO2 SERPL-SCNC: 20 MMOL/L (ref 20–33)
COLOR UR: YELLOW
CREAT SERPL-MCNC: 0.81 MG/DL (ref 0.5–1.4)
CULTURE IF INDICATED INDCX: NO UA CULTURE
EOSINOPHIL # BLD AUTO: 0.23 K/UL (ref 0–0.51)
EOSINOPHIL NFR BLD: 2.3 % (ref 0–6.9)
ERYTHROCYTE [DISTWIDTH] IN BLOOD BY AUTOMATED COUNT: 42.8 FL (ref 35.9–50)
GFR SERPL CREATININE-BSD FRML MDRD: >60 ML/MIN/1.73 M 2
GLOBULIN SER CALC-MCNC: 3.4 G/DL (ref 1.9–3.5)
GLUCOSE SERPL-MCNC: 124 MG/DL (ref 65–99)
GLUCOSE UR STRIP.AUTO-MCNC: NEGATIVE MG/DL
HCG UR QL: NEGATIVE
HCT VFR BLD AUTO: 39.4 % (ref 37–47)
HGB BLD-MCNC: 13 G/DL (ref 12–16)
IMM GRANULOCYTES # BLD AUTO: 0.03 K/UL (ref 0–0.11)
IMM GRANULOCYTES NFR BLD AUTO: 0.3 % (ref 0–0.9)
KETONES UR STRIP.AUTO-MCNC: NEGATIVE MG/DL
LEUKOCYTE ESTERASE UR QL STRIP.AUTO: NEGATIVE
LIPASE SERPL-CCNC: 26 U/L (ref 7–58)
LYMPHOCYTES # BLD AUTO: 1.72 K/UL (ref 1–4.8)
LYMPHOCYTES NFR BLD: 17.3 % (ref 22–41)
MCH RBC QN AUTO: 26.1 PG (ref 27–33)
MCHC RBC AUTO-ENTMCNC: 33 G/DL (ref 33.6–35)
MCV RBC AUTO: 79.1 FL (ref 81.4–97.8)
MICRO URNS: NORMAL
MONOCYTES # BLD AUTO: 0.38 K/UL (ref 0–0.85)
MONOCYTES NFR BLD AUTO: 3.8 % (ref 0–13.4)
NEUTROPHILS # BLD AUTO: 7.57 K/UL (ref 2–7.15)
NEUTROPHILS NFR BLD: 75.9 % (ref 44–72)
NITRITE UR QL STRIP.AUTO: NEGATIVE
NRBC # BLD AUTO: 0 K/UL
NRBC BLD AUTO-RTO: 0 /100 WBC
PH UR STRIP.AUTO: 6 [PH]
PLATELET # BLD AUTO: 335 K/UL (ref 164–446)
PMV BLD AUTO: 9.7 FL (ref 9–12.9)
POTASSIUM SERPL-SCNC: 3.7 MMOL/L (ref 3.6–5.5)
PROT SERPL-MCNC: 7.2 G/DL (ref 6–8.2)
PROT UR QL STRIP: NEGATIVE MG/DL
RBC # BLD AUTO: 4.98 M/UL (ref 4.2–5.4)
RBC UR QL AUTO: NEGATIVE
SODIUM SERPL-SCNC: 135 MMOL/L (ref 135–145)
SP GR UR REFRACTOMETRY: 1.02
SP GR UR STRIP.AUTO: 1.02
WBC # BLD AUTO: 10 K/UL (ref 4.8–10.8)

## 2017-09-01 PROCEDURE — 700111 HCHG RX REV CODE 636 W/ 250 OVERRIDE (IP): Performed by: EMERGENCY MEDICINE

## 2017-09-01 PROCEDURE — 93005 ELECTROCARDIOGRAM TRACING: CPT | Performed by: EMERGENCY MEDICINE

## 2017-09-01 PROCEDURE — 96374 THER/PROPH/DIAG INJ IV PUSH: CPT

## 2017-09-01 PROCEDURE — 76705 ECHO EXAM OF ABDOMEN: CPT

## 2017-09-01 PROCEDURE — 71010 DX-CHEST-LIMITED (1 VIEW): CPT

## 2017-09-01 PROCEDURE — 36415 COLL VENOUS BLD VENIPUNCTURE: CPT

## 2017-09-01 PROCEDURE — 83690 ASSAY OF LIPASE: CPT

## 2017-09-01 PROCEDURE — 99285 EMERGENCY DEPT VISIT HI MDM: CPT

## 2017-09-01 PROCEDURE — 700105 HCHG RX REV CODE 258: Performed by: EMERGENCY MEDICINE

## 2017-09-01 PROCEDURE — 85025 COMPLETE CBC W/AUTO DIFF WBC: CPT

## 2017-09-01 PROCEDURE — 81003 URINALYSIS AUTO W/O SCOPE: CPT

## 2017-09-01 PROCEDURE — 81025 URINE PREGNANCY TEST: CPT

## 2017-09-01 PROCEDURE — 96375 TX/PRO/DX INJ NEW DRUG ADDON: CPT

## 2017-09-01 PROCEDURE — 80053 COMPREHEN METABOLIC PANEL: CPT

## 2017-09-01 RX ORDER — ONDANSETRON 2 MG/ML
4 INJECTION INTRAMUSCULAR; INTRAVENOUS
Status: DISCONTINUED | OUTPATIENT
Start: 2017-09-01 | End: 2017-09-01 | Stop reason: HOSPADM

## 2017-09-01 RX ORDER — HYDROCODONE BITARTRATE AND ACETAMINOPHEN 5; 325 MG/1; MG/1
1-2 TABLET ORAL EVERY 4 HOURS PRN
Qty: 15 TAB | Refills: 0 | Status: ON HOLD | OUTPATIENT
Start: 2017-09-01 | End: 2017-12-13

## 2017-09-01 RX ORDER — OMEPRAZOLE 20 MG/1
20 CAPSULE, DELAYED RELEASE ORAL
COMMUNITY
End: 2017-09-01

## 2017-09-01 RX ORDER — MORPHINE SULFATE 4 MG/ML
4 INJECTION, SOLUTION INTRAMUSCULAR; INTRAVENOUS ONCE
Status: COMPLETED | OUTPATIENT
Start: 2017-09-01 | End: 2017-09-01

## 2017-09-01 RX ORDER — ONDANSETRON 2 MG/ML
4 INJECTION INTRAMUSCULAR; INTRAVENOUS ONCE
Status: COMPLETED | OUTPATIENT
Start: 2017-09-01 | End: 2017-09-01

## 2017-09-01 RX ORDER — SODIUM CHLORIDE 9 MG/ML
1000 INJECTION, SOLUTION INTRAVENOUS ONCE
Status: COMPLETED | OUTPATIENT
Start: 2017-09-01 | End: 2017-09-01

## 2017-09-01 RX ORDER — SIMETHICONE 125 MG
1 CAPSULE ORAL EVERY 6 HOURS PRN
Status: ON HOLD | COMMUNITY
End: 2017-12-13

## 2017-09-01 RX ORDER — OMEPRAZOLE 40 MG/1
40 CAPSULE, DELAYED RELEASE ORAL DAILY
Qty: 30 CAP | Refills: 0 | Status: SHIPPED | OUTPATIENT
Start: 2017-09-01 | End: 2018-02-08 | Stop reason: SDUPTHER

## 2017-09-01 RX ORDER — HYDROCODONE BITARTRATE AND ACETAMINOPHEN 5; 325 MG/1; MG/1
.5-2 TABLET ORAL EVERY 6 HOURS PRN
COMMUNITY
End: 2017-09-01

## 2017-09-01 RX ORDER — ONDANSETRON 4 MG/1
4 TABLET, ORALLY DISINTEGRATING ORAL EVERY 8 HOURS PRN
Qty: 10 TAB | Refills: 0 | Status: ON HOLD | OUTPATIENT
Start: 2017-09-01 | End: 2017-12-13

## 2017-09-01 RX ORDER — ERGOCALCIFEROL 1.25 MG/1
50000 CAPSULE ORAL
Status: ON HOLD | COMMUNITY
End: 2017-12-13

## 2017-09-01 RX ADMIN — ONDANSETRON 4 MG: 2 INJECTION INTRAMUSCULAR; INTRAVENOUS at 06:07

## 2017-09-01 RX ADMIN — SODIUM CHLORIDE 1000 ML: 9 INJECTION, SOLUTION INTRAVENOUS at 06:26

## 2017-09-01 RX ADMIN — MORPHINE SULFATE 4 MG: 4 INJECTION INTRAVENOUS at 06:26

## 2017-09-01 ASSESSMENT — PAIN SCALES - GENERAL: PAINLEVEL_OUTOF10: 2

## 2017-09-01 NOTE — ED NOTES
Patient provided discharge instructions including low fat diet and signs and symptoms to look out for. Patient provided prescriptions, information on medications, how to , and not to drive, drink alcohol, or preform activities that require alertness when taking prescribed pain medication. Patient provided follow up information including why to return to ER, surgical, and GI appointments to make. Patient stated they had no further questions or concerns at this time. Patient discharged to home by self. Patient ambulated out of ER with stable gait.

## 2017-09-01 NOTE — ED PROVIDER NOTES
ED Provider Note    CHIEF COMPLAINT  Chief Complaint   Patient presents with   • N/V       HPI  Elinor Breaux is a 27 y.o. female who presents for bilateral anterior rib pain below the breasts associated with bilateral upper quadrant abdominal pain and bilateral back pain onset at 1 AM. Patient has been constant and severe at times and is 7 of 10 in severity now without trigger nor alleviating factor. No fever. She's had nausea and vomited approximately 6 times, initially food and then bile with a scant amount of blood. No diarrhea. No dysuria urgency frequency pyelonephritis hematuria. No pregnancy. History of GERD on Prilosec. Denies nonsteroidal anti-inflammatory use other than with her monthly menstrual cycle. Occasional alcohol use. No ill contacts food poisoning travel or camping.    REVIEW OF SYSTEMS  Pertinent positives include: Chest abdomen and back pain, nausea, vomiting.  Pertinent negatives include: Diarrhea, pregnancy, lower abdominal pain, dysuria, urgency, frequency, cough, headache, rash, swelling.  10+ systems reviewed and negative.      PAST MEDICAL HISTORY  Past Medical History:   Diagnosis Date   • Allergy     food allergies: nuts and seafood, carries epipen   • Anxiety    • Asthma     childhood, no symptoms since age 8       FAMILY HISTORY  Family History   Problem Relation Age of Onset   • Hypertension Mother    • Anxiety disorder Mother    • Alcohol abuse Mother    • Diabetes Father    • Anxiety disorder Maternal Aunt    • Drug abuse Maternal Aunt    • Anxiety disorder Maternal Uncle        SOCIAL HISTORY  Social History   Substance Use Topics   • Smoking status: Never Smoker   • Smokeless tobacco: Never Used   • Alcohol use Yes      Comment: 2 times every other month     History   Drug Use No       SURGICAL HISTORY  No surgeries    CURRENT MEDICATIONS    Current Outpatient Prescriptions   Medication Sig Dispense Refill   • hydrocortisone rectal (PROCTOZONE HC) 2.5 % Cream Insert 1  Applicatorful in rectum 2 times a day. 30 g 0   • hydrocodone-acetaminophen (NORCO) 5-325 MG Tab per tablet Take 1-2 Tabs by mouth every 6 hours as needed. 10 Tab 0   • ergocalciferol (DRISDOL) 61459 UNIT capsule Take 1 Cap by mouth every 7 days. 12 Cap 0   • omeprazole (PRILOSEC) 20 MG delayed-release capsule Take 1 Cap by mouth every day. 90 Cap 1   • alprazolam (XANAX) 0.5 MG Tab Take 0.5 Tabs by mouth 3 times a day as needed for Sleep. 20 Tab 0   • EPINEPHrine (EPIPEN INJ) by Injection route.         ALLERGIES  Allergies   Allergen Reactions   • Iodine Anaphylaxis     Seafood allergy    • Tree Nuts Food Allergy Anaphylaxis     Peanut allergy       PHYSICAL EXAM  VITAL SIGNS: /86   Pulse (!) 102   Temp 36.5 °C (97.7 °F)   Resp 18   Ht 1.524 m (5')   Wt 89 kg (196 lb 3.4 oz)   LMP 08/07/2017 (Exact Date)   BMI 38.32 kg/m²   Reviewed and hypertensive, tachycardic  Constitutional: Well developed, Well nourished, well appearing, moderately overweight, holding an emesis bag.  HENT: Normocephalic, atraumatic, bilateral external ears normal, oropharynx moist, No exudates or erythema.   Eyes: PERRLA, conjunctiva pink, no scleral icterus.   Cardiovascular: Regular borderline tachycardic without murmur, rub, gallop. No dependent edema or calf cords.  Respiratory: Good air flow without rales, rhonchi, wheeze.  Gastrointestinal: Bilateral upper quadrant tenderness worse on the right with a borderline Vital sign. No maisha rebound guarding or masses. No lower abdominal tenderness. Bowel sounds normal..  Skin: No erythema, no rash.   Genitourinary: Bilateral mild costovertebral angle tenderness.   Neurologic: Alert & oriented x 3, cranial nerves 2-12 intact by passive exam.  No focal deficit noted.  Psychiatric: Affect normal, Judgment normal, Mood normal.     DIFFERENTIAL DIAGNOSIS:  Cholecystitis, pyelonephritis, renal colic, pneumonia, dyspepsia, doubt myocardial ischemia, doubt perforated ulcer.    EKG  EKG  Interpretation    Interpreted by me    Rhythm: normal sinus   Rate: normal at 80  Axis: normal  Ectopy: none  Conduction: normal  ST Segments: no acute change  T Waves: no acute change  Q Waves: none no comparison Clinical Impression: Normal sinus rhythm    RADIOLOGY/PROCEDURES  US-GALLBLADDER   Final Result      1.  Echogenic liver suggesting fatty infiltration.   2.  Multiple gallstones extending to the gallbladder neck.  Cholecystitis is not excluded.   3.  No biliary dilation.      DX-CHEST-LIMITED (1 VIEW)   Final Result      No acute cardiopulmonary disease.          LABORATORY:  Results for orders placed or performed during the hospital encounter of 09/01/17   COMP METABOLIC PANEL   Result Value Ref Range    Sodium 135 135 - 145 mmol/L    Potassium 3.7 3.6 - 5.5 mmol/L    Chloride 108 96 - 112 mmol/L    Co2 20 20 - 33 mmol/L    Anion Gap 7.0 0.0 - 11.9    Glucose 124 (H) 65 - 99 mg/dL    Bun 11 8 - 22 mg/dL    Creatinine 0.81 0.50 - 1.40 mg/dL    Calcium 8.8 8.4 - 10.2 mg/dL    AST(SGOT) 25 12 - 45 U/L    ALT(SGPT) 24 2 - 50 U/L    Alkaline Phosphatase 69 30 - 99 U/L    Total Bilirubin 0.6 0.1 - 1.5 mg/dL    Albumin 3.8 3.2 - 4.9 g/dL    Total Protein 7.2 6.0 - 8.2 g/dL    Globulin 3.4 1.9 - 3.5 g/dL    A-G Ratio 1.1 g/dL   CBC WITH DIFFERENTIAL   Result Value Ref Range    WBC 10.0 4.8 - 10.8 K/uL    RBC 4.98 4.20 - 5.40 M/uL    Hemoglobin 13.0 12.0 - 16.0 g/dL    Hematocrit 39.4 37.0 - 47.0 %    MCV 79.1 (L) 81.4 - 97.8 fL    MCH 26.1 (L) 27.0 - 33.0 pg    MCHC 33.0 (L) 33.6 - 35.0 g/dL    RDW 42.8 35.9 - 50.0 fL    Platelet Count 335 164 - 446 K/uL    MPV 9.7 9.0 - 12.9 fL    Neutrophils-Polys 75.90 (H) 44.00 - 72.00 %    Lymphocytes 17.30 (L) 22.00 - 41.00 %    Monocytes 3.80 0.00 - 13.40 %    Eosinophils 2.30 0.00 - 6.90 %    Basophils 0.40 0.00 - 1.80 %    Immature Granulocytes 0.30 0.00 - 0.90 %    Nucleated RBC 0.00 /100 WBC    Neutrophils (Absolute) 7.57 (H) 2.00 - 7.15 K/uL    Lymphs (Absolute) 1.72  1.00 - 4.80 K/uL    Monos (Absolute) 0.38 0.00 - 0.85 K/uL    Eos (Absolute) 0.23 0.00 - 0.51 K/uL    Baso (Absolute) 0.04 0.00 - 0.12 K/uL    Immature Granulocytes (abs) 0.03 0.00 - 0.11 K/uL    NRBC (Absolute) 0.00 K/uL   LIPASE   Result Value Ref Range    Lipase 26 7 - 58 U/L   URINALYSIS,CULTURE IF INDICATED   Result Value Ref Range    Color Yellow     Character Clear     Specific Gravity 1.025 <1.035    Ph 6.0 5.0 - 8.0    Glucose Negative Negative mg/dL    Ketones Negative Negative mg/dL    Protein Negative Negative mg/dL    Bilirubin Negative Negative    Nitrite Negative Negative    Leukocyte Esterase Negative Negative    Occult Blood Negative Negative    Micro Urine Req see below     Culture Indicated No UA Culture   HCG QUALITATIVE UR   Result Value Ref Range    Beta-Hcg Urine Negative Negative       INTERVENTIONS:Indication: Suspected dehydration given recurrent emesis.  Medications   NS infusion 1,000 mL (not administered)   morphine (pf) 4 mg/ml injection 4 mg (not administered)   ondansetron (ZOFRAN) syringe/vial injection 4 mg (4 mg Intravenous Given 9/1/17 0607)     Response:Resolution of pain. Nontender on repeat exam at 8:30.    COURSE & MEDICAL DECISION MAKING  Well-appearing patient presents with primarily right upper quadrant abdominal pain although she feels that it refers to her chest and her back. She likely has biliary colic with cholelithiasis demonstrated on ultrasound. There is no evidence of cholecystitis, pyelonephritis, renal colic, pregnancy, pneumonia or perforated ulcer. Her pain could also be dyspeptic..    This patient has borderline or elevated blood pressure as recorded above and was instructed to followup with primary physician for comprehensive blood pressure evaluation and yearly fasting cholesterol assessment according to to CMS protocol.      PLAN:  New Prescriptions    HYDROCODONE-ACETAMINOPHEN (NORCO) 5-325 MG TAB PER TABLET    Take 1-2 Tabs by mouth every four hours as  needed (mild pain).    OMEPRAZOLE (PRILOSEC) 40 MG DELAYED-RELEASE CAPSULE    Take 1 Cap by mouth every day.    ONDANSETRON (ZOFRAN ODT) 4 MG TABLET DISPERSIBLE    Take 1 Tab by mouth every 8 hours as needed for Nausea/Vomiting.     Prescription monitoring accessed  Increase omeprazole from 20 mg to 40 mg daily  Cholelithiasis handout given  Return for ill appearance, control vomiting, GI bleed, dizziness, severe uncontrolled pain  Low-fat diet    Jordi Peña M.D.  [address]  [facility] [address]  [facility] [address]  [phone]    Schedule an appointment as soon as possible for a visit in 1 week      [facility]  [facility] [address]  [phone]    Schedule an appointment as soon as possible for a visit in 2 weeks  As needed  If not diagnosed after follow-up with Dr. Peña      CONDITION: Stable.    FINAL IMPRESSION  1. Biliary colic    2. Calculus of gallbladder without cholecystitis without obstruction    3. Gastroesophageal reflux disease, esophagitis presence not specified          Electronically signed by: Rohan Perez, 9/1/2017 6:07 AM

## 2017-09-01 NOTE — DISCHARGE INSTRUCTIONS
"You had a borderline or high normal blood pressure reading today.  This does not necessarily mean you have hypertension.  Please followup with your/a primary physician for comprehensive blood pressure evaluation and yearly fasting cholesterol assessment.  BP Readings from Last 3 Encounters:   09/01/17 158/86   07/12/17 122/78   07/07/17 128/74     ED low-fat diet. Increase Prilosec to 40 mg a day. Take hydrocodone if needed for pain and Zofran for nausea. Follow up with a general surgeon in 1-2 weeks. If the general surgeon doubts the seizure gallbladder then follow-up with GI. Return for fever, uncontrolled vomiting, bleeding, severe pain, ill appearance, dizziness.    Cholelithiasis  Cholelithiasis (also called gallstones) is a form of gallbladder disease in which gallstones form in your gallbladder. The gallbladder is an organ that stores bile made in the liver, which helps digest fats. Gallstones begin as small crystals and slowly grow into stones. Gallstone pain occurs when the gallbladder spasms and a gallstone is blocking the duct. Pain can also occur when a stone passes out of the duct.   RISK FACTORS  · Being female.    · Having multiple pregnancies. Health care providers sometimes advise removing diseased gallbladders before future pregnancies.    · Being obese.  · Eating a diet heavy in fried foods and fat.    · Being older than 60 years and increasing age.    · Prolonged use of medicines containing female hormones.    · Having diabetes mellitus.    · Rapidly losing weight.    · Having a family history of gallstones (heredity).    SYMPTOMS  · Nausea.    · Vomiting.  · Abdominal pain.    · Yellowing of the skin (jaundice).    · Sudden pain. It may persist from several minutes to several hours.  · Fever.    · Tenderness to the touch.   In some cases, when gallstones do not move into the bile duct, people have no pain or symptoms. These are called \"silent\" gallstones.   TREATMENT  Silent gallstones do not " need treatment. In severe cases, emergency surgery may be required. Options for treatment include:  · Surgery to remove the gallbladder. This is the most common treatment.  · Medicines. These do not always work and may take 6-12 months or more to work.  · Shock wave treatment (extracorporeal biliary lithotripsy). In this treatment an ultrasound machine sends shock waves to the gallbladder to break gallstones into smaller pieces that can pass into the intestines or be dissolved by medicine.  HOME CARE INSTRUCTIONS   · Only take over-the-counter or prescription medicines for pain, discomfort, or fever as directed by your health care provider.    · Follow a low-fat diet until seen again by your health care provider. Fat causes the gallbladder to contract, which can result in pain.    · Follow up with your health care provider as directed. Attacks are almost always recurrent and surgery is usually required for permanent treatment.    SEEK IMMEDIATE MEDICAL CARE IF:   · Your pain increases and is not controlled by medicines.    · You have a fever or persistent symptoms for more than 2-3 days.    · You have a fever and your symptoms suddenly get worse.    · You have persistent nausea and vomiting.    MAKE SURE YOU:   · Understand these instructions.  · Will watch your condition.  · Will get help right away if you are not doing well or get worse.     This information is not intended to replace advice given to you by your health care provider. Make sure you discuss any questions you have with your health care provider.     Document Released: 12/14/2006 Document Revised: 08/20/2014 Document Reviewed: 06/11/2014  [employer] Interactive Patient Education ©2016 [employer]

## 2017-09-01 NOTE — ED NOTES
Abdominal pressure and pain radiating to back and epigastric area.  Patient vomited food and then bile.

## 2017-09-01 NOTE — ED NOTES
The Medication Reconciliation process has been completed by interviewing the patient    Allergies have been reviewed  Antibiotic use in 30 days - none    Home Pharmacy:  [facility] - [place]

## 2017-09-07 LAB — EKG IMPRESSION: NORMAL

## 2017-09-08 ENCOUNTER — OFFICE VISIT (OUTPATIENT)
Dept: PEDIATRICS | Facility: CLINIC | Age: 27
End: 2017-09-08
Payer: COMMERCIAL

## 2017-09-08 DIAGNOSIS — F41.0 PANIC DISORDER WITHOUT AGORAPHOBIA: ICD-10-CM

## 2017-09-08 PROCEDURE — 90834 PSYTX W PT 45 MINUTES: CPT | Performed by: PSYCHOLOGIST

## 2017-09-13 NOTE — PROGRESS NOTES
"      Note Title:  Individual Outpatient Psychotherapy Progress Note      Name:  Elinor Breaux  MRN:  [id number]  :  1990  Age:  27 y.o.    PCP:  Cyn Guerin PA-C    Service Rendered:  Individual Psychotherapy  Date of Service:  2017  Length of Service:  45 minutes    Persons in Attendence: Elinor    Interim History:  Pt reported continued improvements in her anxiety and how she is coping with it. Reported her mood as generally good. Denied having any panic attacks in the past few months. Pt reported several incidents where she experieced less anxiety and engaged less in catastrophic thinking than she would have before. She reported having some health concerns that she is getting diagnostically evaluated, possibly gall stones. Pt also reported that she has continued to make several positive health behavior changes. Engaged in continued values clarification. Pt will continue mindfulness practice on own. Discussed transitioning her care to an adult practice where she can continue to receive and benefit from mindfulness practice. Gave her a referral for [facility].     Diagnostic Impressions:    1. Panic disorder without agoraphobia      Mental Status Exam:   Appearance:  Well groomed, good hygiene, appears stated age.   Behavior:  Pleasant, sociable, cooperative.    Mood:  \"good,” stable.    Affect:  Appropriate to mood, normal range.   Speech/Language:  Normal rate, rhythm, and tone.   Sensorium:  Alert and oriented to person, place, time, and situation.   Memory and Cognition:  Within normal limits, no evidence of gross cognitive, intellectual, or memory impairments   Thought Process/Thought Content:  Logical, linear, goal directed. Reality testing appears intact.    Insight/Judgment: Good.      Risk Assessment:  Elinor denied concerns regarding risk to self or others.       Treatment Recommendations and Plan:    Will begin termination of individual therapy utilizing ACT approach to anxiety " treatment. Discussed transition to adult practice where she can continue to receive and benefit from mindfulness interventions and practice. Provided her with referral to [facility]. Will have one more session in 6 weeks.       The above diagnostic impressions, recommendations, and treatment plan were discussed with and agreed upon by Elinor. Care will be coordinated with Elinor’s healthcare team, as appropriate.      Cleo Talbert, PhD  Licensed Psychologist  [facility]

## 2017-10-07 ENCOUNTER — HOSPITAL ENCOUNTER (OUTPATIENT)
Dept: LAB | Facility: MEDICAL CENTER | Age: 27
End: 2017-10-07
Attending: PHYSICIAN ASSISTANT
Payer: COMMERCIAL

## 2017-10-07 DIAGNOSIS — E55.9 VITAMIN D DEFICIENCY DISEASE: ICD-10-CM

## 2017-10-07 LAB — 25(OH)D3 SERPL-MCNC: 31 NG/ML (ref 30–100)

## 2017-10-07 PROCEDURE — 82306 VITAMIN D 25 HYDROXY: CPT

## 2017-10-07 PROCEDURE — 36415 COLL VENOUS BLD VENIPUNCTURE: CPT

## 2017-10-20 ENCOUNTER — OFFICE VISIT (OUTPATIENT)
Dept: PEDIATRICS | Facility: CLINIC | Age: 27
End: 2017-10-20
Payer: COMMERCIAL

## 2017-10-20 DIAGNOSIS — F41.0 PANIC DISORDER WITHOUT AGORAPHOBIA: ICD-10-CM

## 2017-10-20 DIAGNOSIS — F40.248 SITUATIONAL PHOBIA: ICD-10-CM

## 2017-10-20 PROCEDURE — 90834 PSYTX W PT 45 MINUTES: CPT | Performed by: PSYCHOLOGIST

## 2017-10-20 NOTE — PROGRESS NOTES
"      Note Title:  Individual Outpatient Psychotherapy Progress Note      Name:  Elinor Breaux  MRN:  [id number]  :  1990  Age:  27 y.o.    PCP:  Cyn Guerin PA-C    Service Rendered:  Individual Psychotherapy  Date of Service:  10/20/2017  Length of Service:  45 minutes    Persons in Attendence: Elinor    Interim History:  Pt reported continued improvements in her anxiety and how she is coping with it. Reported her mood as generally good. Denied having any panic attacks in the past few months. Pt reported several incidents where she experieced less anxiety and engaged less in catastrophic thinking than she would have before. She reported having gall stones, receiving further evaluation and being told she will need to have surgery. Processed thoughts/feelings. Discussed concerns. Pt reported that she has continued to make several positive health behavior changes. Also reported that she has lost 15 pounds in the past few months. Pt also disclosed less stress/vicareous trauma reactions to work-related exposures (e.g., criminal activity, lewdness, etc.). Engaged in continued values clarification. Pt will continue mindfulness practice on own. Discussed transitioning her care to an adult practice where she can continue to receive and benefit from mindfulness practice. Pt plans to call [facility] if she needs continued care in the future.     Diagnostic Impressions:    1. Panic disorder without agoraphobia    2. Situational phobia      Mental Status Exam:   Appearance:  Well groomed, good hygiene, appears stated age.   Behavior:  Pleasant, sociable, cooperative.    Mood:  \"good,” stable.    Affect:  Appropriate to mood, normal range.   Speech/Language:  Normal rate, rhythm, and tone.   Sensorium:  Alert and oriented to person, place, time, and situation.   Memory and Cognition:  Within normal limits, no evidence of gross cognitive, intellectual, or memory impairments   Thought Process/Thought Content:  " Logical, linear, goal directed. Reality testing appears intact.    Insight/Judgment: Good.      Risk Assessment:  Elinor denied concerns regarding risk to self or others.       Treatment Recommendations and Plan:    Terminated treatment as goals were met and have been maintained for several months. Discussed transition to adult practice where she can continue to receive and benefit from mindfulness interventions and practice, if indicated in the future. Provided her with referral to [facility].     The above diagnostic impressions, recommendations, and treatment plan were discussed with and agreed upon by Elinor. Care will be coordinated with Elinor’s healthcare team, as appropriate.      Cleo Talbert, PhD  Licensed Psychologist  [facility]

## 2017-10-31 ENCOUNTER — PATIENT MESSAGE (OUTPATIENT)
Dept: MEDICAL GROUP | Facility: LAB | Age: 27
End: 2017-10-31

## 2017-10-31 DIAGNOSIS — Z91.018 MULTIPLE FOOD ALLERGIES: ICD-10-CM

## 2017-10-31 RX ORDER — EPINEPHRINE 0.3 MG/.3ML
0.3 INJECTION SUBCUTANEOUS ONCE
Qty: 0.3 ML | Refills: 0 | Status: SHIPPED | OUTPATIENT
Start: 2017-10-31 | End: 2017-10-31

## 2017-10-31 NOTE — TELEPHONE ENCOUNTER
From: Elinor Breaux  To: Cyn Guerin P.A.-C.  Sent: 10/31/2017 9:19 AM PDT  Subject: Prescription Question    Bisi Addison,    This past Halloween weekend I unfortunately had to use my epi-pen because I accidentally ate chocolate with almonds. I was wondering if I could get a prescription for my epi-pen. Thank you, Elinor Breaux

## 2017-12-13 ENCOUNTER — HOSPITAL ENCOUNTER (INPATIENT)
Facility: MEDICAL CENTER | Age: 27
LOS: 1 days | DRG: 418 | End: 2017-12-14
Attending: SURGERY | Admitting: SURGERY
Payer: COMMERCIAL

## 2017-12-13 DIAGNOSIS — Z91.018 MULTIPLE FOOD ALLERGIES: ICD-10-CM

## 2017-12-13 DIAGNOSIS — E66.9 OBESITY (BMI 35.0-39.9 WITHOUT COMORBIDITY): ICD-10-CM

## 2017-12-13 DIAGNOSIS — K80.10 CALCULUS OF GALLBLADDER WITH CHRONIC CHOLECYSTITIS WITHOUT OBSTRUCTION: ICD-10-CM

## 2017-12-13 LAB
ALBUMIN SERPL BCP-MCNC: 3.7 G/DL (ref 3.2–4.9)
ALBUMIN/GLOB SERPL: 1.2 G/DL
ALP SERPL-CCNC: 69 U/L (ref 30–99)
ALT SERPL-CCNC: 10 U/L (ref 2–50)
ANION GAP SERPL CALC-SCNC: 7 MMOL/L (ref 0–11.9)
AST SERPL-CCNC: 13 U/L (ref 12–45)
BILIRUB SERPL-MCNC: 0.3 MG/DL (ref 0.1–1.5)
BUN SERPL-MCNC: 13 MG/DL (ref 8–22)
CALCIUM SERPL-MCNC: 8.9 MG/DL (ref 8.5–10.5)
CHLORIDE SERPL-SCNC: 110 MMOL/L (ref 96–112)
CO2 SERPL-SCNC: 22 MMOL/L (ref 20–33)
CREAT SERPL-MCNC: 0.68 MG/DL (ref 0.5–1.4)
GFR SERPL CREATININE-BSD FRML MDRD: >60 ML/MIN/1.73 M 2
GLOBULIN SER CALC-MCNC: 3.2 G/DL (ref 1.9–3.5)
GLUCOSE SERPL-MCNC: 102 MG/DL (ref 65–99)
HCG UR QL: NEGATIVE
POTASSIUM SERPL-SCNC: 3.9 MMOL/L (ref 3.6–5.5)
PROT SERPL-MCNC: 6.9 G/DL (ref 6–8.2)
SODIUM SERPL-SCNC: 139 MMOL/L (ref 135–145)
SP GR UR REFRACTOMETRY: 1.02

## 2017-12-13 PROCEDURE — 160002 HCHG RECOVERY MINUTES (STAT): Performed by: SURGERY

## 2017-12-13 PROCEDURE — 501583 HCHG TROCAR, THRD CAN&SEAL 5X100: Performed by: SURGERY

## 2017-12-13 PROCEDURE — 500868 HCHG NEEDLE, SURGI(VARES): Performed by: SURGERY

## 2017-12-13 PROCEDURE — 88304 TISSUE EXAM BY PATHOLOGIST: CPT

## 2017-12-13 PROCEDURE — 700102 HCHG RX REV CODE 250 W/ 637 OVERRIDE(OP)

## 2017-12-13 PROCEDURE — 700101 HCHG RX REV CODE 250

## 2017-12-13 PROCEDURE — 700111 HCHG RX REV CODE 636 W/ 250 OVERRIDE (IP)

## 2017-12-13 PROCEDURE — A9270 NON-COVERED ITEM OR SERVICE: HCPCS | Performed by: SURGERY

## 2017-12-13 PROCEDURE — 501838 HCHG SUTURE GENERAL: Performed by: SURGERY

## 2017-12-13 PROCEDURE — 83520 IMMUNOASSAY QUANT NOS NONAB: CPT

## 2017-12-13 PROCEDURE — 81025 URINE PREGNANCY TEST: CPT

## 2017-12-13 PROCEDURE — A6402 STERILE GAUZE <= 16 SQ IN: HCPCS | Performed by: SURGERY

## 2017-12-13 PROCEDURE — 700102 HCHG RX REV CODE 250 W/ 637 OVERRIDE(OP): Performed by: SURGERY

## 2017-12-13 PROCEDURE — 160035 HCHG PACU - 1ST 60 MINS PHASE I: Performed by: SURGERY

## 2017-12-13 PROCEDURE — 770022 HCHG ROOM/CARE - ICU (200)

## 2017-12-13 PROCEDURE — 500697 HCHG HEMOCLIP, LARGE (ORANGE): Performed by: SURGERY

## 2017-12-13 PROCEDURE — 700101 HCHG RX REV CODE 250: Performed by: SURGERY

## 2017-12-13 PROCEDURE — 160036 HCHG PACU - EA ADDL 30 MINS PHASE I: Performed by: SURGERY

## 2017-12-13 PROCEDURE — 160048 HCHG OR STATISTICAL LEVEL 1-5: Performed by: SURGERY

## 2017-12-13 PROCEDURE — 502571 HCHG PACK, LAP CHOLE: Performed by: SURGERY

## 2017-12-13 PROCEDURE — 501584 HCHG TROCAR, THRD CAN&SEAL11X100: Performed by: SURGERY

## 2017-12-13 PROCEDURE — 501399 HCHG SPECIMAN BAG, ENDO CATC: Performed by: SURGERY

## 2017-12-13 PROCEDURE — 501572 HCHG TROCAR, SHIELD OBTU 5X100: Performed by: SURGERY

## 2017-12-13 PROCEDURE — 160009 HCHG ANES TIME/MIN: Performed by: SURGERY

## 2017-12-13 PROCEDURE — 160028 HCHG SURGERY MINUTES - 1ST 30 MINS LEVEL 3: Performed by: SURGERY

## 2017-12-13 PROCEDURE — 700111 HCHG RX REV CODE 636 W/ 250 OVERRIDE (IP): Performed by: ANESTHESIOLOGY

## 2017-12-13 PROCEDURE — 160039 HCHG SURGERY MINUTES - EA ADDL 1 MIN LEVEL 3: Performed by: SURGERY

## 2017-12-13 PROCEDURE — 0FT44ZZ RESECTION OF GALLBLADDER, PERCUTANEOUS ENDOSCOPIC APPROACH: ICD-10-PCS | Performed by: SURGERY

## 2017-12-13 PROCEDURE — A9270 NON-COVERED ITEM OR SERVICE: HCPCS

## 2017-12-13 PROCEDURE — 80053 COMPREHEN METABOLIC PANEL: CPT

## 2017-12-13 PROCEDURE — 501582 HCHG TROCAR, THRD BLADED: Performed by: SURGERY

## 2017-12-13 RX ORDER — OXYCODONE HCL 5 MG/5 ML
SOLUTION, ORAL ORAL
Status: COMPLETED
Start: 2017-12-13 | End: 2017-12-13

## 2017-12-13 RX ORDER — LIDOCAINE HYDROCHLORIDE 10 MG/ML
INJECTION, SOLUTION INFILTRATION; PERINEURAL
Status: COMPLETED
Start: 2017-12-13 | End: 2017-12-13

## 2017-12-13 RX ORDER — DIPHENHYDRAMINE HYDROCHLORIDE 50 MG/ML
25 INJECTION INTRAMUSCULAR; INTRAVENOUS EVERY 6 HOURS
Status: COMPLETED | OUTPATIENT
Start: 2017-12-13 | End: 2017-12-14

## 2017-12-13 RX ORDER — HYDROCODONE BITARTRATE AND ACETAMINOPHEN 5; 325 MG/1; MG/1
0.5 TABLET ORAL ONCE
Status: ON HOLD | COMMUNITY
End: 2017-12-13

## 2017-12-13 RX ORDER — ALPRAZOLAM 0.5 MG/1
0.5 TABLET ORAL PRN
COMMUNITY
End: 2019-03-18 | Stop reason: SDUPTHER

## 2017-12-13 RX ORDER — ONDANSETRON 2 MG/ML
4 INJECTION INTRAMUSCULAR; INTRAVENOUS EVERY 6 HOURS PRN
Status: DISCONTINUED | OUTPATIENT
Start: 2017-12-13 | End: 2017-12-14 | Stop reason: HOSPADM

## 2017-12-13 RX ORDER — SODIUM CHLORIDE, SODIUM LACTATE, POTASSIUM CHLORIDE, CALCIUM CHLORIDE 600; 310; 30; 20 MG/100ML; MG/100ML; MG/100ML; MG/100ML
INJECTION, SOLUTION INTRAVENOUS CONTINUOUS
Status: DISCONTINUED | OUTPATIENT
Start: 2017-12-13 | End: 2017-12-14 | Stop reason: HOSPADM

## 2017-12-13 RX ORDER — OXYCODONE HYDROCHLORIDE 5 MG/1
5 TABLET ORAL EVERY 4 HOURS PRN
Status: DISCONTINUED | OUTPATIENT
Start: 2017-12-13 | End: 2017-12-14 | Stop reason: HOSPADM

## 2017-12-13 RX ORDER — LIDOCAINE HYDROCHLORIDE 10 MG/ML
0.5 INJECTION, SOLUTION INFILTRATION; PERINEURAL
Status: ACTIVE | OUTPATIENT
Start: 2017-12-13 | End: 2017-12-14

## 2017-12-13 RX ORDER — OXYCODONE HYDROCHLORIDE 5 MG/1
5-10 TABLET ORAL EVERY 4 HOURS PRN
Status: DISCONTINUED | OUTPATIENT
Start: 2017-12-13 | End: 2017-12-13

## 2017-12-13 RX ORDER — SCOLOPAMINE TRANSDERMAL SYSTEM 1 MG/1
PATCH, EXTENDED RELEASE TRANSDERMAL
Status: COMPLETED
Start: 2017-12-13 | End: 2017-12-13

## 2017-12-13 RX ORDER — BUPIVACAINE HYDROCHLORIDE AND EPINEPHRINE 5; 5 MG/ML; UG/ML
INJECTION, SOLUTION EPIDURAL; INTRACAUDAL; PERINEURAL
Status: DISCONTINUED | OUTPATIENT
Start: 2017-12-13 | End: 2017-12-13 | Stop reason: HOSPADM

## 2017-12-13 RX ORDER — OXYCODONE HYDROCHLORIDE 10 MG/1
10 TABLET ORAL EVERY 4 HOURS PRN
Status: DISCONTINUED | OUTPATIENT
Start: 2017-12-13 | End: 2017-12-14 | Stop reason: HOSPADM

## 2017-12-13 RX ORDER — LIDOCAINE AND PRILOCAINE 25; 25 MG/G; MG/G
1 CREAM TOPICAL
Status: ACTIVE | OUTPATIENT
Start: 2017-12-13 | End: 2017-12-14

## 2017-12-13 RX ORDER — DEXTROSE MONOHYDRATE, SODIUM CHLORIDE, AND POTASSIUM CHLORIDE 50; 1.49; 4.5 G/1000ML; G/1000ML; G/1000ML
INJECTION, SOLUTION INTRAVENOUS CONTINUOUS
Status: DISCONTINUED | OUTPATIENT
Start: 2017-12-13 | End: 2017-12-14 | Stop reason: HOSPADM

## 2017-12-13 RX ADMIN — SCOPALAMINE 1 PATCH: 1 PATCH, EXTENDED RELEASE TRANSDERMAL at 07:45

## 2017-12-13 RX ADMIN — FENTANYL CITRATE 25 MCG: 50 INJECTION, SOLUTION INTRAMUSCULAR; INTRAVENOUS at 11:37

## 2017-12-13 RX ADMIN — FENTANYL CITRATE 25 MCG: 50 INJECTION, SOLUTION INTRAMUSCULAR; INTRAVENOUS at 11:47

## 2017-12-13 RX ADMIN — DIPHENHYDRAMINE HYDROCHLORIDE 25 MG: 50 INJECTION, SOLUTION INTRAMUSCULAR; INTRAVENOUS at 22:30

## 2017-12-13 RX ADMIN — ALBUTEROL SULFATE 2.5 MG: 2.5 SOLUTION RESPIRATORY (INHALATION) at 11:04

## 2017-12-13 RX ADMIN — LIDOCAINE HYDROCHLORIDE 0.5 ML: 10 INJECTION, SOLUTION INFILTRATION; PERINEURAL at 08:25

## 2017-12-13 RX ADMIN — POTASSIUM CHLORIDE, DEXTROSE MONOHYDRATE AND SODIUM CHLORIDE: 150; 5; 450 INJECTION, SOLUTION INTRAVENOUS at 12:53

## 2017-12-13 RX ADMIN — HYDROCORTISONE SODIUM SUCCINATE 100 MG: 100 INJECTION, POWDER, FOR SOLUTION INTRAMUSCULAR; INTRAVENOUS at 22:30

## 2017-12-13 RX ADMIN — OXYCODONE HYDROCHLORIDE 10 MG: 10 TABLET ORAL at 20:43

## 2017-12-13 RX ADMIN — OXYCODONE HYDROCHLORIDE 10 MG: 5 SOLUTION ORAL at 11:45

## 2017-12-13 RX ADMIN — SODIUM CHLORIDE, SODIUM LACTATE, POTASSIUM CHLORIDE, CALCIUM CHLORIDE: 600; 310; 30; 20 INJECTION, SOLUTION INTRAVENOUS at 08:25

## 2017-12-13 RX ADMIN — OXYCODONE HYDROCHLORIDE 5 MG: 5 TABLET ORAL at 16:58

## 2017-12-13 RX ADMIN — DIPHENHYDRAMINE HYDROCHLORIDE 25 MG: 50 INJECTION, SOLUTION INTRAMUSCULAR; INTRAVENOUS at 17:04

## 2017-12-13 ASSESSMENT — LIFESTYLE VARIABLES
TOTAL SCORE: 0
HAVE PEOPLE ANNOYED YOU BY CRITICIZING YOUR DRINKING: NO
HAVE YOU EVER FELT YOU SHOULD CUT DOWN ON YOUR DRINKING: NO
EVER HAD A DRINK FIRST THING IN THE MORNING TO STEADY YOUR NERVES TO GET RID OF A HANGOVER: NO
TOTAL SCORE: 0
CONSUMPTION TOTAL: POSITIVE
AVERAGE NUMBER OF DAYS PER WEEK YOU HAVE A DRINK CONTAINING ALCOHOL: 0
EVER_SMOKED: NEVER
HOW MANY TIMES IN THE PAST YEAR HAVE YOU HAD 5 OR MORE DRINKS IN A DAY: 3
TOTAL SCORE: 0
EVER FELT BAD OR GUILTY ABOUT YOUR DRINKING: NO
ALCOHOL_USE: YES
ON A TYPICAL DAY WHEN YOU DRINK ALCOHOL HOW MANY DRINKS DO YOU HAVE: 2

## 2017-12-13 ASSESSMENT — PAIN SCALES - GENERAL
PAINLEVEL_OUTOF10: 4
PAINLEVEL_OUTOF10: 6
PAINLEVEL_OUTOF10: 4
PAINLEVEL_OUTOF10: 3
PAINLEVEL_OUTOF10: 3
PAINLEVEL_OUTOF10: 0
PAINLEVEL_OUTOF10: 0
PAINLEVEL_OUTOF10: 4
PAINLEVEL_OUTOF10: 0
PAINLEVEL_OUTOF10: 4
PAINLEVEL_OUTOF10: 4
PAINLEVEL_OUTOF10: 5
PAINLEVEL_OUTOF10: 4
PAINLEVEL_OUTOF10: 0
PAINLEVEL_OUTOF10: 6
PAINLEVEL_OUTOF10: 6
PAINLEVEL_OUTOF10: 2
PAINLEVEL_OUTOF10: 7

## 2017-12-13 ASSESSMENT — ENCOUNTER SYMPTOMS
ABDOMINAL PAIN: 1
STRIDOR: 0
WHEEZING: 0
NERVOUS/ANXIOUS: 1

## 2017-12-13 ASSESSMENT — PATIENT HEALTH QUESTIONNAIRE - PHQ9
2. FEELING DOWN, DEPRESSED, IRRITABLE, OR HOPELESS: NOT AT ALL
SUM OF ALL RESPONSES TO PHQ9 QUESTIONS 1 AND 2: 0
1. LITTLE INTEREST OR PLEASURE IN DOING THINGS: NOT AT ALL
SUM OF ALL RESPONSES TO PHQ QUESTIONS 1-9: 0

## 2017-12-13 NOTE — PROGRESS NOTES
"Anesthesia Perioperative Note    This is a 27 year old female with past medical history significant for obesity, anxiety, asthma as a child, and symptomatic cholelithiasis. Of note, she is also \"very allergic,\" and her allergies include shellfish and nuts. She denies any known drug allergies.    The operative course was uneventful. Upon emergence, she had oxygen desaturation into the 60-70s. Initially, this was thought to be due to bronchospasm due to her distant history of asthma, increased airway pressures, and diminished breath sounds bilaterally. She was initially treated with albuterol with some improvement in air movement. However, small doses of epinephrine were administered (10-20mcg) to help improve saturation. At the same time, a granular, red rash was noticed on her body and she became significantly hypotensive (systolic BP 50-60s). She was given larger doses of epinephrine and hydrocortisone and diphenhydramine were also administered. Her BP and O2sat improved after these treatments. From my estimation, we struggled with desaturation and hypotensive for approximately 10 minutes.    Although she had developed some facial edema, she was extubated awake after adequate cuff leak was assured and her hemodynamics had stabilized. She was brought to the PACU and a full report was given.    The plan for her will be to have 24 hour observation in the ICU given the facial swelling and concern for further swelling and respiratory compromise. A tryptase level will be drawn in the PACU. The patient should receive a second dose of hydrocortisone 100mg twelve hours after initial dose and three more doses of diphenhydramine 25mg IV q6hrs. These orders have been placed.    After consultation with pharmacy, the suspected culprit for this anaphylactic reaction is sugammadex (0.3% anaphylactic reaction rate in healthy patients) or hydromorphone (which can cause significant delayed histamine release). It would beneficial to " have this patient skin tested for hydromorphone allergy as an outpatient to potentially rule this out.    Please contact me with any questions regarding her anesthetic care.    Paulie Dickson MD  [facility]

## 2017-12-13 NOTE — OR NURSING
Belongings taken from locker and placed on gurney with pt. 1-pink bag. Pt is stable, respirations are regular and easy. VSS.

## 2017-12-13 NOTE — OP REPORT
DATE OF SERVICE:  12/13/2017    PREOPERATIVE DIAGNOSIS:  Symptomatic cholelithiasis.    POSTOPERATIVE DIAGNOSIS:  Symptomatic cholelithiasis.    PROCEDURE:  Laparoscopic cholecystectomy.    ANESTHESIA:  General endotracheal.    ANESTHESIOLOGIST:  Paulie Dickson MD    SURGEON:  Jordi Peña MD    FIRST ASSISTANT:  Abbey Nicole MD    INDICATIONS:  The patient with gallstones having pain intermittently and needs   operative management.    OPERATIVE FINDINGS:  Adhesions to the gallbladder showing chronic inflammation   and stones, but no acute inflammation.    OPERATIVE NOTE:  The patient was taken to the operating room, was placed in   supine position, was given general endotracheal anesthesia.  Once properly   anesthetized, she was prepped and draped in usual sterile fashion.  Marcaine   0.5% with epinephrine was used to anesthetize the skin supraumbilically.  A   transverse incision was made and carried down through the skin and   subcutaneous tissue.  A Veress needle insertion was not satisfactory due to no   drop test.  Therefore, it was removed and an open insertion technique was   used.  Once the camera was inserted into the abdominal cavity, general   exploration was carried out.  There was no apparent injury from failed Veress   needle insertion or open technique.  Under direct vision, the 11 mm epigastric   and 2 lateral 5 mm ports were placed after the skin had been anesthetized   with 0.5% Marcaine with epinephrine.  The gallbladder was grasped at its   fundus and infundibulum, adhesions of the omentum were taken down.  The   peritoneal reflection was stripped down exposing the cystic duct and artery.    These were clipped proximally and distally with proximal end x3 on the duct   and x2 on the artery.  Gallbladder was brought off the liver bed with hook   electrocautery and removed with an EndoCatch bag out of the epigastric port   site.  The gallbladder fossa was reinspected and cauterized  for small bleeding   points.  At the end of the procedure, there was no bleeding or bile leaks   from the liver bed, duct, or artery.  Under direct vision, the epigastric port   site was closed with an 0 Vicryl Endo Close.  The umbilical port site was   closed with a figure-of-eight 0 Vicryl closure and skin incisions were all   closed with 4-0 Vicryl subcuticular closures.  Sterile Tegaderm dressings were   applied.  The patient tolerated the procedure well.  There were no apparent   complications.  Lap, sponge and instrument counts were correct.       ____________________________________     MD MARGOTH Armando / DENISE    DD:  12/13/2017 10:50:02  DT:  12/13/2017 11:02:16    D#:  [id number]  Job#:  [id number]

## 2017-12-13 NOTE — PROGRESS NOTES
Trauma/Surgical Progress Note    Author: Bc Foreman Date & Time created: 12/13/2017   2:57 PM     Interval Events:  Anaphylaxis in PAR   Resolving   History of multiple allergies   S/P lap kamar   Observe     Review of Systems   HENT: Negative for congestion.    Respiratory: Negative for wheezing and stridor.    Gastrointestinal: Positive for abdominal pain.   Psychiatric/Behavioral: The patient is nervous/anxious.    All other systems reviewed and are negative.    Hemodynamics:  Blood pressure 120/80, pulse (!) 107, temperature 36.9 °C (98.5 °F), resp. rate 20, height 1.524 m (5'), weight 85.9 kg (189 lb 6 oz), last menstrual period 12/02/2017, SpO2 98 %, not currently breastfeeding.     Respiratory:    Respiration: 20, Pulse Oximetry: 98 %        RUL Breath Sounds: Clear, RML Breath Sounds: Diminished, RLL Breath Sounds: Diminished, GABI Breath Sounds: Clear, LLL Breath Sounds: Diminished  Fluids:    Intake/Output Summary (Last 24 hours) at 12/13/17 1457  Last data filed at 12/13/17 1400   Gross per 24 hour   Intake             2100 ml   Output                0 ml   Net             2100 ml     Admit Weight: 85 kg (187 lb 6.3 oz)  Current Weight: 85.9 kg (189 lb 6 oz)    Physical Exam   Constitutional: She is oriented to person, place, and time. She appears well-developed.   HENT:   Head: Normocephalic.   Eyes: Pupils are equal, round, and reactive to light.   Neck: Normal range of motion. Neck supple. No thyromegaly present.   Cardiovascular: Regular rhythm and normal heart sounds.    tachy   Pulmonary/Chest: Effort normal and breath sounds normal. No respiratory distress.   Abdominal: Soft. She exhibits no distension. There is tenderness.   Musculoskeletal: She exhibits no edema.   Neurological: She is alert and oriented to person, place, and time. She has normal reflexes.   Skin: Skin is warm and dry. No rash noted. No erythema.   Psychiatric: She has a normal mood and affect.   Nursing note and vitals  reviewed.      Medical Decision Making/Problem List:    ananaphylaxis and shock   Acute respiratory failure post surgery     Stabilize and on intubation watch   Core Measures & Quality Metrics:  Labs reviewed, Medications reviewed and Radiology images reviewed  Regan catheter: No Regan        DVT prophylaxis - mechanical: SCDs          LAWANDA Score   I independently reviewed pertinent clinical lab tests from the last 48 hours and ordered additional follow up clinical lab tests.  I independently reviewed pertinent radiographic images and the radiologist's reports from the last 48 hours and ordered additional follow up radiographic studies.  I reviewed the details of the available patient records and documentation by consulting physicians in EPIC up to today, summated the information, and utilized the information as warranted in today's medical decision making for this patient.  I personally evaluated the patient condition at bedside and discussed the daily plan(s) with available nursing staff, dieticians, social workers, pharmacists on rounds.    Discussed patient condition with Family, RN and Patient.  CRITICAL CARE TIME EXCLUDING PROCEDURES: 23    minutes

## 2017-12-13 NOTE — CARE PLAN
Problem: Pain Management  Goal: Pain level will decrease to patient's comfort goal  Pain assessed and documented. Pharmacologic and non pharmacologic interventions in place. Pillows in place for comfort and positioning. See MAR.    Problem: Respiratory:  Goal: Respiratory status will improve  Continuous monitoring of respiratory status. Titration of O2 as needed.

## 2017-12-13 NOTE — PROGRESS NOTES
Pt arrived to S116 at 1240.    HR: 105  BP: 119/62  O2: 97% 2L NC  RR: 18    Temp: 98.58F Temporal  Weight: 85.9kg

## 2017-12-13 NOTE — OR SURGEON
Immediate Post OP Note    PreOp Diagnosis: Symptomatic Cholelithiasis    PostOp Diagnosis: same    Procedure(s):  Laparoscopic Cholecystectomy - Wound Class: Clean Contaminated    Surgeon(s):  MARKUS Christiansen M.D.    Anesthesiologist/Type of Anesthesia:GET  Anesthesiologist: Paulie Dickson M.D./General    Surgical Staff:  Circulator: Leah Morris R.N.  Scrub Person: Keiko Hannon    Specimens:  gallbladder    Estimated Blood Loss: none    Findings: no acute inflammation, stones    Complications: none        12/13/2017 10:42 AM Jordi Peña

## 2017-12-14 VITALS
RESPIRATION RATE: 18 BRPM | HEIGHT: 60 IN | TEMPERATURE: 97.5 F | OXYGEN SATURATION: 94 % | DIASTOLIC BLOOD PRESSURE: 80 MMHG | HEART RATE: 93 BPM | BODY MASS INDEX: 37.18 KG/M2 | SYSTOLIC BLOOD PRESSURE: 120 MMHG | WEIGHT: 189.38 LBS

## 2017-12-14 PROBLEM — K80.10 CALCULUS OF GALLBLADDER WITH CHOLECYSTITIS: Status: ACTIVE | Noted: 2017-12-14

## 2017-12-14 PROBLEM — K80.10 CALCULUS OF GALLBLADDER WITH CHOLECYSTITIS: Status: RESOLVED | Noted: 2017-12-14 | Resolved: 2017-12-14

## 2017-12-14 PROCEDURE — 700111 HCHG RX REV CODE 636 W/ 250 OVERRIDE (IP): Performed by: ANESTHESIOLOGY

## 2017-12-14 PROCEDURE — 700102 HCHG RX REV CODE 250 W/ 637 OVERRIDE(OP): Performed by: SURGERY

## 2017-12-14 PROCEDURE — A9270 NON-COVERED ITEM OR SERVICE: HCPCS | Performed by: SURGERY

## 2017-12-14 RX ADMIN — DIPHENHYDRAMINE HYDROCHLORIDE 25 MG: 50 INJECTION, SOLUTION INTRAMUSCULAR; INTRAVENOUS at 05:08

## 2017-12-14 RX ADMIN — OXYCODONE HYDROCHLORIDE 10 MG: 10 TABLET ORAL at 01:06

## 2017-12-14 RX ADMIN — OXYCODONE HYDROCHLORIDE 10 MG: 10 TABLET ORAL at 05:08

## 2017-12-14 RX ADMIN — OXYCODONE HYDROCHLORIDE 10 MG: 10 TABLET ORAL at 09:09

## 2017-12-14 ASSESSMENT — PAIN SCALES - GENERAL
PAINLEVEL_OUTOF10: 3
PAINLEVEL_OUTOF10: 0
PAINLEVEL_OUTOF10: 8
PAINLEVEL_OUTOF10: 6
PAINLEVEL_OUTOF10: 0
PAINLEVEL_OUTOF10: 5
PAINLEVEL_OUTOF10: 6
PAINLEVEL_OUTOF10: 0
PAINLEVEL_OUTOF10: 6
PAINLEVEL_OUTOF10: 3

## 2017-12-14 ASSESSMENT — PATIENT HEALTH QUESTIONNAIRE - PHQ9
1. LITTLE INTEREST OR PLEASURE IN DOING THINGS: NOT AT ALL
SUM OF ALL RESPONSES TO PHQ9 QUESTIONS 1 AND 2: 0
2. FEELING DOWN, DEPRESSED, IRRITABLE, OR HOPELESS: NOT AT ALL
SUM OF ALL RESPONSES TO PHQ QUESTIONS 1-9: 0

## 2017-12-14 ASSESSMENT — LIFESTYLE VARIABLES: EVER_SMOKED: NEVER

## 2017-12-14 ASSESSMENT — ENCOUNTER SYMPTOMS
VOMITING: 0
CARDIOVASCULAR NEGATIVE: 1
NAUSEA: 0
RESPIRATORY NEGATIVE: 1
ROS GI COMMENTS: INCISIONAL PAIN

## 2017-12-14 NOTE — DISCHARGE PLANNING
[facility] has accepted DME referral and will set up equipment at home for night time use.  Tonia) notified.

## 2017-12-14 NOTE — FACE TO FACE
Face to Face Note  -  Durable Medical Equipment    Jordi Peña M.D. - NPI: [id number]  I certify that this patient is under my care and that they had a durable medical equipment(DME)face to face encounter by myself that meets the physician DME face-to-face encounter requirements with this patient on:    Date of encounter:   Patient:                    MRN:                       YOB: 2019  Elinor Breaux  [id number]  1990     The encounter with the patient was in whole, or in part, for the following medical condition, which is the primary reason for durable medical equipment:  Other - low O2 sats    I certify that, based on my findings, the following durable medical equipment is medically necessary:  Oxygen.    HOME O2 Saturation Measurements:(Values must be present for Home Oxygen orders)         ,     ,         My Clinical findings support the need for the above equipment due to:  Hypoxia    Supporting Symptoms: off oxygen has sats when sleeping to 82%

## 2017-12-14 NOTE — PROGRESS NOTES
Surgical Progress Note    Author: Jordi Peña Date & Time created: 2017   7:46 AM     Interval Events:    Review of Systems   Respiratory: Negative.    Cardiovascular: Negative.    Gastrointestinal: Negative for nausea and vomiting.        Incisional pain     Hemodynamics:  Temp (24hrs), Av.8 °C (98.2 °F), Min:36.5 °C (97.7 °F), Max:36.9 °C (98.5 °F)  Temperature: 36.8 °C (98.2 °F)  Pulse  Av.7  Min: 65  Max: 144Heart Rate (Monitored): 83  Blood Pressure: 120/80, NIBP: (!) 96/51     Respiratory:    Respiration: (!) 28, Pulse Oximetry: 98 %        RUL Breath Sounds: Clear, RML Breath Sounds: Diminished, RLL Breath Sounds: Diminished, GABI Breath Sounds: Clear, LLL Breath Sounds: Diminished  Neuro:  GCS       Fluids:    Intake/Output Summary (Last 24 hours) at 17 0746  Last data filed at 17 0600   Gross per 24 hour   Intake             3690 ml   Output             2700 ml   Net              990 ml     Weight: 85.9 kg (189 lb 6 oz)  Current Diet Order   Procedures   • Diet Order     Physical Exam   Cardiovascular: Normal rate, regular rhythm and normal heart sounds.    Pulmonary/Chest: Effort normal and breath sounds normal. No respiratory distress.   Abdominal: Soft. There is tenderness.   Incisions clean     Labs:  Recent Results (from the past 24 hour(s))   HCG QUALITATIVE URINE (Pregnancy)    Collection Time: 17  7:52 AM   Result Value Ref Range    Beta-Hcg Urine Negative Negative   REFRACTOMETER SG    Collection Time: 17  7:52 AM   Result Value Ref Range    Specific Gravity 1.024    Comp Metabolic Panel    Collection Time: 17  8:25 AM   Result Value Ref Range    Sodium 139 135 - 145 mmol/L    Potassium 3.9 3.6 - 5.5 mmol/L    Chloride 110 96 - 112 mmol/L    Co2 22 20 - 33 mmol/L    Anion Gap 7.0 0.0 - 11.9    Glucose 102 (H) 65 - 99 mg/dL    Bun 13 8 - 22 mg/dL    Creatinine 0.68 0.50 - 1.40 mg/dL    Calcium 8.9 8.5 - 10.5 mg/dL    AST(SGOT) 13 12 - 45 U/L     ALT(SGPT) 10 2 - 50 U/L    Alkaline Phosphatase 69 30 - 99 U/L    Total Bilirubin 0.3 0.1 - 1.5 mg/dL    Albumin 3.7 3.2 - 4.9 g/dL    Total Protein 6.9 6.0 - 8.2 g/dL    Globulin 3.2 1.9 - 3.5 g/dL    A-G Ratio 1.2 g/dL   ESTIMATED GFR    Collection Time: 12/13/17  8:25 AM   Result Value Ref Range    GFR If African American >60 >60 mL/min/1.73 m 2    GFR If Non African American >60 >60 mL/min/1.73 m 2     Medical Decision Making, by Problem:  Active Hospital Problems    Diagnosis   • Calculus of gallbladder with cholecystitis [K80.10]     Plan:  Transfer to floor, work on Home O2 as she desats when sleeping. Possible home later today.    Quality Measures:    Regan catheter::  No Regan  DVT prophylaxis pharmacological::  Not indicated at this time, ambulatory      Discussed patient condition with Patient

## 2017-12-14 NOTE — PROGRESS NOTES
Pt transferred to S101 on ICU monitor, with belongings, chart, and meds. VSS throughout. Bedside report given to Nicolas KELLY. Chart check and skin assessment completed.

## 2017-12-14 NOTE — PROGRESS NOTES
Trauma/Surgical Progress Note    Author: Bc Foreman Date & Time created: 12/14/2017   1:59 PM     Interval Events:  No further allergic reactions   arranging home o2  Then discharge  ROS  Hemodynamics:  Blood pressure 120/80, pulse 88, temperature 36.4 °C (97.5 °F), resp. rate (!) 21, height 1.524 m (5'), weight 85.9 kg (189 lb 6 oz), last menstrual period 12/02/2017, SpO2 92 %, not currently breastfeeding.     Respiratory:    Respiration: (!) 21, Pulse Oximetry: 92 %        RUL Breath Sounds: Clear, RML Breath Sounds: Diminished, RLL Breath Sounds: Diminished, GABI Breath Sounds: Clear, LLL Breath Sounds: Diminished  Fluids:    Intake/Output Summary (Last 24 hours) at 12/14/17 1359  Last data filed at 12/14/17 0800   Gross per 24 hour   Intake             2110 ml   Output             2700 ml   Net             -590 ml     Admit Weight: 85 kg (187 lb 6.3 oz)  Current Weight: 85.9 kg (189 lb 6 oz)    Physical Exam   Constitutional: She is oriented to person, place, and time. She appears well-developed.   HENT:   Head: Normocephalic.   Eyes: Pupils are equal, round, and reactive to light.   Neck: Normal range of motion. Neck supple. No thyromegaly present.   Cardiovascular: Regular rhythm and normal heart sounds.    tachy   Pulmonary/Chest: Effort normal and breath sounds normal. No respiratory distress.   Abdominal: Soft. She exhibits no distension. There is tenderness.   Musculoskeletal: She exhibits no edema.   Neurological: She is alert and oriented to person, place, and time. She has normal reflexes.   Skin: Skin is warm and dry. No rash noted. No erythema.   Psychiatric: She has a normal mood and affect.   Nursing note and vitals reviewed.      Medical Decision Making/Problem List:    Active Hospital Problems    Diagnosis   • Calculus of gallbladder with cholecystitis [K80.10]     Core Measures & Quality Metrics:  Core Measures & Quality Metrics  LAWANDA Score   I independently reviewed pertinent clinical lab  tests from the last 48 hours and ordered additional follow up clinical lab tests.  I independently reviewed pertinent radiographic images and the radiologist's reports from the last 48 hours and ordered additional follow up radiographic studies.  I reviewed the details of the available patient records and documentation by consulting physicians in EPIC up to today, summated the information, and utilized the information as warranted in today's medical decision making for this patient.  I personally evaluated the patient condition at bedside and discussed the daily plan(s) with available nursing staff, dieticians, social workers, pharmacists on rounds.  .  Discussed patient condition with Family, RN, RT and Pharmacy.  CRITICAL CARE TIME EXCLUDING PROCEDURES:     minutes  30

## 2017-12-14 NOTE — DISCHARGE INSTRUCTIONS
Discharge Instructions    Discharged to home by car with friend. Discharged via wheelchair, hospital escort: Yes.  Special equipment needed: Oxygen    Be sure to schedule a follow-up appointment with your primary care doctor or any specialists as instructed.     Discharge Plan:   Diet Plan: Discussed  Activity Level: Discussed  Confirmed Follow up Appointment: Patient to Call and Schedule Appointment  Confirmed Symptoms Management: Discussed  Medication Reconciliation Updated: Yes  Influenza Vaccine Indication: Patient Refuses    I understand that a diet low in cholesterol, fat, and sodium is recommended for good health. Unless I have been given specific instructions below for another diet, I accept this instruction as my diet prescription.   Other diet: regular    Special Instructions: None    · Is patient discharged on Warfarin / Coumadin?   No     · Is patient Post Blood Transfusion?  No    Depression / Suicide Risk    As you are discharged from this [facility] facility, it is important to learn how to keep safe from harming yourself.    Recognize the warning signs:  · Abrupt changes in personality, positive or negative- including increase in energy   · Giving away possessions  · Change in eating patterns- significant weight changes-  positive or negative  · Change in sleeping patterns- unable to sleep or sleeping all the time   · Unwillingness or inability to communicate  · Depression  · Unusual sadness, discouragement and loneliness  · Talk of wanting to die  · Neglect of personal appearance   · Rebelliousness- reckless behavior  · Withdrawal from people/activities they love  · Confusion- inability to concentrate     If you or a loved one observes any of these behaviors or has concerns about self-harm, here's what you can do:  · Talk about it- your feelings and reasons for harming yourself  · Remove any means that you might use to hurt yourself (examples: pills, rope, extension cords, firearm)  · Get  professional help from the community (Mental Health, Substance Abuse, psychological counseling)  · Do not be alone:Call your Safe Contact- someone whom you trust who will be there for you.  · Call your local CRISIS HOTLINE 522-5104 or 822-857-2698  · Call your local Children's Mobile Crisis Response Team [place] (423) 516-4255 or www.[employer]  · Call the toll free National Suicide Prevention Hotlines   · National Suicide Prevention Lifeline 890-460-QGID (1652)  · National Hope Line Network 800-SUICIDE (780-2960)

## 2017-12-14 NOTE — CARE PLAN
Problem: Pain Management  Goal: Pain level will decrease to patient's comfort goal  Outcome: PROGRESSING AS EXPECTED  PRNs available as needed    Problem: Communication  Goal: The ability to communicate needs accurately and effectively will improve  Outcome: PROGRESSING AS EXPECTED  AAO X4, verbalizes needs

## 2017-12-14 NOTE — DISCHARGE PLANNING
Medical Social Work     SW obtained choice for Home 02 and pt chose [facility], pt will be cash pay. SCOTT faxed choice to [facility].

## 2017-12-14 NOTE — PROGRESS NOTES
Patient taken via wheelchair by transport to personal vehicle, discharged home with friend/roommate  VSS, no signs of distress. Home O2 ordered QHS

## 2017-12-16 LAB — TRYPTASE SERPL-MCNC: 34.9 UG/L

## 2017-12-26 ENCOUNTER — HOSPITAL ENCOUNTER (OUTPATIENT)
Dept: RADIOLOGY | Facility: MEDICAL CENTER | Age: 27
End: 2017-12-26
Attending: SURGERY
Payer: COMMERCIAL

## 2017-12-26 DIAGNOSIS — K80.00 CALCULUS OF GALLBLADDER WITH ACUTE CHOLECYSTITIS WITHOUT OBSTRUCTION: ICD-10-CM

## 2017-12-26 PROCEDURE — 71020 DX-CHEST-2 VIEWS: CPT

## 2018-02-08 DIAGNOSIS — K21.9 GASTROESOPHAGEAL REFLUX DISEASE, ESOPHAGITIS PRESENCE NOT SPECIFIED: ICD-10-CM

## 2018-02-08 RX ORDER — OMEPRAZOLE 40 MG/1
40 CAPSULE, DELAYED RELEASE ORAL DAILY
Qty: 90 CAP | Refills: 2 | Status: SHIPPED | OUTPATIENT
Start: 2018-02-08 | End: 2019-03-18

## 2019-03-01 ENCOUNTER — OFFICE VISIT (OUTPATIENT)
Dept: URGENT CARE | Facility: PHYSICIAN GROUP | Age: 29
End: 2019-03-01
Payer: COMMERCIAL

## 2019-03-01 ENCOUNTER — HOSPITAL ENCOUNTER (OUTPATIENT)
Facility: MEDICAL CENTER | Age: 29
End: 2019-03-01
Attending: PHYSICIAN ASSISTANT
Payer: COMMERCIAL

## 2019-03-01 VITALS
DIASTOLIC BLOOD PRESSURE: 84 MMHG | WEIGHT: 200 LBS | HEART RATE: 87 BPM | SYSTOLIC BLOOD PRESSURE: 130 MMHG | HEIGHT: 60 IN | BODY MASS INDEX: 39.27 KG/M2 | OXYGEN SATURATION: 98 % | RESPIRATION RATE: 13 BRPM | TEMPERATURE: 98.1 F

## 2019-03-01 DIAGNOSIS — L02.91 ABSCESS: ICD-10-CM

## 2019-03-01 PROCEDURE — 99213 OFFICE O/P EST LOW 20 MIN: CPT | Performed by: PHYSICIAN ASSISTANT

## 2019-03-01 PROCEDURE — 87070 CULTURE OTHR SPECIMN AEROBIC: CPT

## 2019-03-01 RX ORDER — SULFAMETHOXAZOLE AND TRIMETHOPRIM 800; 160 MG/1; MG/1
1 TABLET ORAL 2 TIMES DAILY
Qty: 14 TAB | Refills: 0 | Status: SHIPPED | OUTPATIENT
Start: 2019-03-01 | End: 2019-03-08

## 2019-03-01 ASSESSMENT — ENCOUNTER SYMPTOMS
FEVER: 0
HEADACHES: 0
EYE DISCHARGE: 0
VOMITING: 0
NAUSEA: 0
ABDOMINAL PAIN: 0
CHILLS: 0
COUGH: 0
SORE THROAT: 0

## 2019-03-01 ASSESSMENT — PAIN SCALES - GENERAL: PAINLEVEL: 3=SLIGHT PAIN

## 2019-03-02 DIAGNOSIS — L02.91 ABSCESS: ICD-10-CM

## 2019-03-02 NOTE — PROGRESS NOTES
Subjective:      Elinor Breaux is a 28 y.o. female who presents with Pain (Belly botton pain. Onset tues. Drainage.)            Patient c/o discomfort to her belly button x 4 days ago. Patient states she developed a discomfort to her lower belly button that is worse with standing. She describes the discomfort a stretching sensation. Yesterday, the patient noted a clear, foul smelling discharge from her belly button. She denies fever/chills.     PMH:  has a past medical history of Allergy; Anxiety; and Asthma.  MEDS:   Current Outpatient Prescriptions:   •  sulfamethoxazole-trimethoprim (BACTRIM DS) 800-160 MG tablet, Take 1 Tab by mouth 2 times a day for 7 days., Disp: 14 Tab, Rfl: 0  •  omeprazole (PRILOSEC) 40 MG delayed-release capsule, Take 1 Cap by mouth every day., Disp: 90 Cap, Rfl: 2  •  vitamin D (CHOLECALCIFEROL) 1000 UNIT Tab, Take 5,000 Units by mouth every day., Disp: , Rfl:   •  Inulin (FIBER CHOICE FRUITY BITES) 1.5 g Chew Tab, Take 1 Tab by mouth 1 time daily as needed., Disp: , Rfl:   •  alprazolam (XANAX) 0.5 MG Tab, Take 0.5 mg by mouth as needed for Sleep., Disp: , Rfl:   •  EPINEPHrine (EPIPEN INJ), 0.3 mg by Injection route as needed (anaphylaxis)., Disp: , Rfl:   ALLERGIES:   Allergies   Allergen Reactions   • Hydromorphone Anaphylaxis     Anaphylactic reaction at end of surgery 12/13/17. Will try to rule out Dilaudid vs. Sugammadex.    • Iodine Anaphylaxis     Seafood allergy    • Sugammadex Anaphylaxis     Anaphylaxitic reaction at end of surgery 12/13/17. Will try to rule out Dilaudid vs. Sugammadex.   • Tree Nuts Food Allergy Anaphylaxis     Peanut allergy   • Sunflower Oil      SURGHX:   Past Surgical History:   Procedure Laterality Date   • ELZA BY LAPAROSCOPY  12/13/2017    Procedure: ELZA BY LAPAROSCOPY;  Surgeon: Jordi Peña M.D.;  Location: SURGERY [facility];  Service: General     SOCHX:  reports that she has never smoked. She quit smokeless tobacco use about 7 years  ago. She reports that she drinks alcohol. She reports that she does not use drugs.  FH: Family history was reviewed, no pertinent findings to report        HPI    Review of Systems   Constitutional: Negative for chills and fever.   HENT: Negative for congestion and sore throat.    Eyes: Negative for discharge.   Respiratory: Negative for cough.    Gastrointestinal: Negative for abdominal pain, nausea and vomiting.   Genitourinary: Negative for dysuria, frequency and urgency.   Skin: Negative for itching and rash.   Neurological: Negative for headaches.          Objective:     /84   Pulse 87   Temp 36.7 °C (98.1 °F)   Resp 13   Ht 1.524 m (5')   Wt 90.7 kg (200 lb)   SpO2 98%   BMI 39.06 kg/m²      Physical Exam   Constitutional: She is oriented to person, place, and time. She appears well-developed and well-nourished.   HENT:   Head: Normocephalic and atraumatic.   Eyes: Conjunctivae and EOM are normal.   Neck: Neck supple.   Pulmonary/Chest: Effort normal.   Abdominal: Soft.       Neurological: She is alert and oriented to person, place, and time.   Skin: Skin is warm. No rash noted.          Progress:   Wound Culture - Results Pending    Progress Update:   Wound Culture -  Rare growth mixed skin bridget  Heavy growth mixed anaerobic bridget predominantly  Peptostreptococcus      Called patient with an update of the culture results. Patient states she is still having a purulent discharge from her belly button. Will switch the ABX to Augmentin x 7 days for anaerobic coverage. Patient instructed to stop taking the Bactrim at this time. Instructed the patient to follow-up if she continues to have discharge after finishing the ABX. Patient agrees to the above plan.    Assessment/Plan:      1. Abscess   The patient's presenting symptoms may be caused by a localized abscess. However, no subcutaneous abscess was visualized on exam. Discussed treatment options (ABX vs Imaging) with patient at length. Given the  presence of malodorous discharge from the umbilicus, the patient and I decided to start with ABX therapy to try and resolve the problem. If the drainage fails to resolve with ABX treatment, she may warrant a CT or other imaging modality at that time. In the meantime, I cultured the wound to help determine the source of infection. Discussed strict return precautions with the patient, and she verbalized understanding.   Plan:   Bactrim DS PO BID x 7 days  Will call with culture results  Return to clinic if sxs worsen of fail to improve, or if the patient should develop fever/chills, worsening abdominal pain, nausea/vomiting, and/or a change in the discharge from her belly button.     Updated Plan:   Stop taking Batrim DS   Switch ABX to Augmentin 875mg-125mg PO BID x 7 days  Return to clinic if symptoms worsen or fail improve despite ABX change.      Discussed this plan with the patient, and she agreed to the above.

## 2019-03-04 ENCOUNTER — TELEPHONE (OUTPATIENT)
Dept: URGENT CARE | Facility: PHYSICIAN GROUP | Age: 29
End: 2019-03-04

## 2019-03-04 LAB
BACTERIA WND AEROBE CULT: ABNORMAL
BACTERIA WND AEROBE CULT: ABNORMAL
SIGNIFICANT IND 70042: ABNORMAL
SITE SITE: ABNORMAL
SOURCE SOURCE: ABNORMAL

## 2019-03-05 RX ORDER — AMOXICILLIN AND CLAVULANATE POTASSIUM 875; 125 MG/1; MG/1
1 TABLET, FILM COATED ORAL 2 TIMES DAILY
Qty: 14 TAB | Refills: 0 | Status: SHIPPED | OUTPATIENT
Start: 2019-03-05 | End: 2019-03-12

## 2019-03-18 ENCOUNTER — OFFICE VISIT (OUTPATIENT)
Dept: MEDICAL GROUP | Facility: LAB | Age: 29
End: 2019-03-18
Payer: COMMERCIAL

## 2019-03-18 VITALS
TEMPERATURE: 98.4 F | BODY MASS INDEX: 39.3 KG/M2 | DIASTOLIC BLOOD PRESSURE: 70 MMHG | HEART RATE: 88 BPM | SYSTOLIC BLOOD PRESSURE: 122 MMHG | RESPIRATION RATE: 13 BRPM | OXYGEN SATURATION: 92 % | WEIGHT: 200.2 LBS | HEIGHT: 60 IN

## 2019-03-18 DIAGNOSIS — F41.9 ANXIETY: ICD-10-CM

## 2019-03-18 DIAGNOSIS — E66.9 OBESITY (BMI 35.0-39.9 WITHOUT COMORBIDITY): ICD-10-CM

## 2019-03-18 DIAGNOSIS — K21.9 GASTROESOPHAGEAL REFLUX DISEASE, ESOPHAGITIS PRESENCE NOT SPECIFIED: ICD-10-CM

## 2019-03-18 PROBLEM — E66.01 MORBID OBESITY WITH BMI OF 40.0-44.9, ADULT (HCC): Status: RESOLVED | Noted: 2017-02-21 | Resolved: 2019-03-18

## 2019-03-18 PROCEDURE — 99214 OFFICE O/P EST MOD 30 MIN: CPT | Performed by: NURSE PRACTITIONER

## 2019-03-18 RX ORDER — ALPRAZOLAM 0.5 MG/1
0.5 TABLET ORAL PRN
Qty: 30 TAB | Refills: 0 | Status: SHIPPED | OUTPATIENT
Start: 2019-03-18 | End: 2019-04-17

## 2019-03-18 RX ORDER — OMEPRAZOLE 20 MG/1
20 CAPSULE, DELAYED RELEASE ORAL DAILY
Qty: 90 CAP | Refills: 3 | Status: SHIPPED | OUTPATIENT
Start: 2019-03-18 | End: 2020-06-05 | Stop reason: SDUPTHER

## 2019-03-18 RX ORDER — OMEPRAZOLE 40 MG/1
40 CAPSULE, DELAYED RELEASE ORAL DAILY
Qty: 90 CAP | Refills: 2 | Status: CANCELLED | OUTPATIENT
Start: 2019-03-18

## 2019-03-18 RX ORDER — ALPRAZOLAM 0.5 MG/1
0.5 TABLET ORAL PRN
Qty: 30 TAB | Refills: 0 | Status: SHIPPED
Start: 2019-03-18 | End: 2019-03-18 | Stop reason: SDUPTHER

## 2019-03-18 ASSESSMENT — PATIENT HEALTH QUESTIONNAIRE - PHQ9: CLINICAL INTERPRETATION OF PHQ2 SCORE: 0

## 2019-03-18 NOTE — ASSESSMENT & PLAN NOTE
This is a chronic uncontrolled problem.  Pt reports she gained 50 lbs in grad school and admits that she may be lacking in her dietary control especially at lunchtime.  She does report that she has lost a few pounds recently through exercise at the gym and tries to eat healthy most meals.

## 2019-03-18 NOTE — ASSESSMENT & PLAN NOTE
This is a chronic problem for which she was seen first in 2016.  It is intermittent and she has a prescription of Xanax that she has not refilled since 2017.  She only takes it when she really needs it which usually is situational.  She reports a stable mood at this time.  Denies suicidal ideation and homicidal ideation.  I have reviewed the  and there are no signs of misuse.  She has filled out a controlled substance agreement today and performed urine drug screen. \

## 2019-03-18 NOTE — ASSESSMENT & PLAN NOTE
This is a chronic problem for patient.  She was started on omeprazole in 2015, however she had only been taking her it 2-3 times weekly as needed after she ate a spicy meal.  This problem appears improved overall with the omeprazole however recently she reports that she has been woken up twice because of regurgitation.  Both of these episodes occurred after eating tacos.  Associated symptoms include burning in esophagus and coughing. She denies fevers, chills, nausea, emesis, hematemesis, hemoptysis, epigastric abdominal pain, loose stools, constipation, hematochezia, melena We have discussed lifestyle interventions including not eating late at night, not laying down after eating and raising the head of the bed.  We have also discussed taking omeprazole daily and she agrees to try this for the next 8 weeks and follow-up with me then.  .

## 2019-03-18 NOTE — PATIENT INSTRUCTIONS
Ways to help your acid reflux:    · Eat smaller portions.  · Avoid lying down after meals.  · Normalize body weight.  · Avoid common reflux triggers such as spicy, greasy foods, peppers, onions.  · Avoid caffeine, carbonation, alcohol, tobacco.  · Raise the head of your bed by placing bricks or phone books between the floor and the legs of the head board.   · Use OTC medicines such as TUMS, Rolaids, Maalox, Gaviscon, Zantac, Pepcid, Tagamet.

## 2019-03-19 ENCOUNTER — TELEMEDICINE ORIGINATING SITE VISIT (OUTPATIENT)
Dept: MEDICAL GROUP | Facility: LAB | Age: 29
End: 2019-03-19

## 2019-03-29 DIAGNOSIS — F41.9 ANXIETY: ICD-10-CM

## 2019-03-30 ENCOUNTER — HOSPITAL ENCOUNTER (OUTPATIENT)
Dept: LAB | Facility: MEDICAL CENTER | Age: 29
End: 2019-03-30
Attending: NURSE PRACTITIONER
Payer: COMMERCIAL

## 2019-03-30 DIAGNOSIS — E66.9 OBESITY (BMI 35.0-39.9 WITHOUT COMORBIDITY): ICD-10-CM

## 2019-03-30 LAB
ALBUMIN SERPL BCP-MCNC: 3.9 G/DL (ref 3.2–4.9)
ALBUMIN/GLOB SERPL: 1.3 G/DL
ALP SERPL-CCNC: 72 U/L (ref 30–99)
ALT SERPL-CCNC: 18 U/L (ref 2–50)
ANION GAP SERPL CALC-SCNC: 8 MMOL/L (ref 0–11.9)
AST SERPL-CCNC: 14 U/L (ref 12–45)
BASOPHILS # BLD AUTO: 0.5 % (ref 0–1.8)
BASOPHILS # BLD: 0.04 K/UL (ref 0–0.12)
BILIRUB SERPL-MCNC: 0.2 MG/DL (ref 0.1–1.5)
BUN SERPL-MCNC: 15 MG/DL (ref 8–22)
CALCIUM SERPL-MCNC: 8.8 MG/DL (ref 8.5–10.5)
CHLORIDE SERPL-SCNC: 108 MMOL/L (ref 96–112)
CHOLEST SERPL-MCNC: 192 MG/DL (ref 100–199)
CO2 SERPL-SCNC: 24 MMOL/L (ref 20–33)
CREAT SERPL-MCNC: 0.75 MG/DL (ref 0.5–1.4)
EOSINOPHIL # BLD AUTO: 0.37 K/UL (ref 0–0.51)
EOSINOPHIL NFR BLD: 4.2 % (ref 0–6.9)
ERYTHROCYTE [DISTWIDTH] IN BLOOD BY AUTOMATED COUNT: 41.7 FL (ref 35.9–50)
EST. AVERAGE GLUCOSE BLD GHB EST-MCNC: 128 MG/DL
GLOBULIN SER CALC-MCNC: 3.1 G/DL (ref 1.9–3.5)
GLUCOSE SERPL-MCNC: 132 MG/DL (ref 65–99)
HBA1C MFR BLD: 6.1 % (ref 0–5.6)
HCT VFR BLD AUTO: 45.8 % (ref 37–47)
HDLC SERPL-MCNC: 42 MG/DL
HGB BLD-MCNC: 14.9 G/DL (ref 12–16)
IMM GRANULOCYTES # BLD AUTO: 0.02 K/UL (ref 0–0.11)
IMM GRANULOCYTES NFR BLD AUTO: 0.2 % (ref 0–0.9)
LDLC SERPL CALC-MCNC: 112 MG/DL
LYMPHOCYTES # BLD AUTO: 2.57 K/UL (ref 1–4.8)
LYMPHOCYTES NFR BLD: 29.2 % (ref 22–41)
MCH RBC QN AUTO: 28.2 PG (ref 27–33)
MCHC RBC AUTO-ENTMCNC: 32.5 G/DL (ref 33.6–35)
MCV RBC AUTO: 86.7 FL (ref 81.4–97.8)
MONOCYTES # BLD AUTO: 0.51 K/UL (ref 0–0.85)
MONOCYTES NFR BLD AUTO: 5.8 % (ref 0–13.4)
NEUTROPHILS # BLD AUTO: 5.28 K/UL (ref 2–7.15)
NEUTROPHILS NFR BLD: 60.1 % (ref 44–72)
NRBC # BLD AUTO: 0 K/UL
NRBC BLD-RTO: 0 /100 WBC
PLATELET # BLD AUTO: 373 K/UL (ref 164–446)
PMV BLD AUTO: 9.9 FL (ref 9–12.9)
POTASSIUM SERPL-SCNC: 3.9 MMOL/L (ref 3.6–5.5)
PROT SERPL-MCNC: 7 G/DL (ref 6–8.2)
RBC # BLD AUTO: 5.28 M/UL (ref 4.2–5.4)
SODIUM SERPL-SCNC: 140 MMOL/L (ref 135–145)
TRIGL SERPL-MCNC: 192 MG/DL (ref 0–149)
WBC # BLD AUTO: 8.8 K/UL (ref 4.8–10.8)

## 2019-03-30 PROCEDURE — 80053 COMPREHEN METABOLIC PANEL: CPT

## 2019-03-30 PROCEDURE — 83036 HEMOGLOBIN GLYCOSYLATED A1C: CPT

## 2019-03-30 PROCEDURE — 80061 LIPID PANEL: CPT

## 2019-03-30 PROCEDURE — 85025 COMPLETE CBC W/AUTO DIFF WBC: CPT

## 2019-03-30 PROCEDURE — 36415 COLL VENOUS BLD VENIPUNCTURE: CPT

## 2019-05-20 ENCOUNTER — OFFICE VISIT (OUTPATIENT)
Dept: MEDICAL GROUP | Facility: LAB | Age: 29
End: 2019-05-20
Payer: COMMERCIAL

## 2019-05-20 VITALS
RESPIRATION RATE: 14 BRPM | TEMPERATURE: 98.7 F | HEIGHT: 60 IN | BODY MASS INDEX: 39.07 KG/M2 | HEART RATE: 77 BPM | SYSTOLIC BLOOD PRESSURE: 114 MMHG | DIASTOLIC BLOOD PRESSURE: 72 MMHG | WEIGHT: 199 LBS | OXYGEN SATURATION: 95 %

## 2019-05-20 DIAGNOSIS — M21.41 BILATERAL PES PLANUS: ICD-10-CM

## 2019-05-20 DIAGNOSIS — K21.9 GASTROESOPHAGEAL REFLUX DISEASE, ESOPHAGITIS PRESENCE NOT SPECIFIED: ICD-10-CM

## 2019-05-20 DIAGNOSIS — E66.9 OBESITY (BMI 35.0-39.9 WITHOUT COMORBIDITY): ICD-10-CM

## 2019-05-20 DIAGNOSIS — R73.01 IMPAIRED FASTING GLUCOSE: ICD-10-CM

## 2019-05-20 DIAGNOSIS — M21.42 BILATERAL PES PLANUS: ICD-10-CM

## 2019-05-20 DIAGNOSIS — E78.2 MIXED HYPERLIPIDEMIA: ICD-10-CM

## 2019-05-20 PROCEDURE — 99214 OFFICE O/P EST MOD 30 MIN: CPT | Performed by: NURSE PRACTITIONER

## 2019-05-20 NOTE — ASSESSMENT & PLAN NOTE
This is a new problem.  We have discussed the labs below and patient has already begun to make significant dietary changes.  Patient and I then discussed necessary dietary changes to make to address dyslipidemia.  Patient verbalized understanding.She denies dizziness, generalized weakness/fatigue, vision/hearing changes, jaw pain/paresthesias, BUE pain/paresthesias/numbness/weakness, chest pain/pressure, palpitations, dyspnea, RUQ abdominal pain, oliguria/anuria, BLE edema.    Lab Results   Component Value Date/Time    CHOLSTRLTOT 192 03/30/2019 08:08 AM     (H) 03/30/2019 08:08 AM    HDL 42 03/30/2019 08:08 AM    TRIGLYCERIDE 192 (H) 03/30/2019 08:08 AM

## 2019-05-20 NOTE — ASSESSMENT & PLAN NOTE
This is a new problem for patient.    We discussed that patient is diagnosed with impaired fasting glucose, and uncontrolled, given that the A1c is:   Lab Results   Component Value Date/Time    HBA1C 6.1 (H) 03/30/2019 08:08 AM       We discussed that prediabetes/diabetes mellitus type 2 are medical conditions of lifestyle habits (less than optimal dietary choices, insufficient cardiovascular exercise), and that they can be controlled (in part or in whole) by these lifestyle changes.     Furthermore, we discussed that at present time it is better to pursue lifestyle changes rather than starting medications. Patient is in agreement.     ROS is NEGATIVE for blurred vision, polydipsia, polyuria, diaphoresis, palpitations, fatigue, irritability, flank pain, BLE paresthesias.

## 2019-05-20 NOTE — ASSESSMENT & PLAN NOTE
This is chronic. Taking PPI daily as directed first thing in the morning. No early satiety, unintentional weight loss, choking, melena, hematochezia, persistent burning pain in chest or upper abdomen. They have never had an EGD.

## 2019-05-20 NOTE — PROGRESS NOTES
CC:Diagnoses of Gastroesophageal reflux disease, esophagitis presence not specified, Mixed hyperlipidemia, Impaired fasting glucose, and Bilateral pes planus were pertinent to this visit.    HISTORY OF PRESENT ILLNESS: Elinor Breaux is a 28 y.o. female established patient who presents today to discuss the following problems:    Gastroesophageal reflux disease  This is chronic. Taking PPI daily as directed first thing in the morning. No early satiety, unintentional weight loss, choking, melena, hematochezia, persistent burning pain in chest or upper abdomen. They have never had an EGD.      Bilateral pes planus  There is a chronic problem for patient.  She requests a referral to podiatry for evaluation.    Hyperlipidemia  This is a new problem.  We have discussed the labs below and patient has already begun to make significant dietary changes.  Patient and I then discussed necessary dietary changes to make to address dyslipidemia.  Patient verbalized understanding.She denies dizziness, generalized weakness/fatigue, vision/hearing changes, jaw pain/paresthesias, BUE pain/paresthesias/numbness/weakness, chest pain/pressure, palpitations, dyspnea, RUQ abdominal pain, oliguria/anuria, BLE edema.    Lab Results   Component Value Date/Time    CHOLSTRLTOT 192 03/30/2019 08:08 AM     (H) 03/30/2019 08:08 AM    HDL 42 03/30/2019 08:08 AM    TRIGLYCERIDE 192 (H) 03/30/2019 08:08 AM         Impaired fasting glucose  This is a new problem for patient.    We discussed that patient is diagnosed with impaired fasting glucose, and uncontrolled, given that the A1c is:   Lab Results   Component Value Date/Time    HBA1C 6.1 (H) 03/30/2019 08:08 AM       We discussed that prediabetes/diabetes mellitus type 2 are medical conditions of lifestyle habits (less than optimal dietary choices, insufficient cardiovascular exercise), and that they can be controlled (in part or in whole) by these lifestyle changes.     Furthermore, we  discussed that at present time it is better to pursue lifestyle changes rather than starting medications. Patient is in agreement.     ROS is NEGATIVE for blurred vision, polydipsia, polyuria, diaphoresis, palpitations, fatigue, irritability, flank pain, BLE paresthesias.      Health Maintenance: Completed    Patient Active Problem List    Diagnosis Date Noted   • Impaired fasting glucose 05/20/2019   • Bilateral pes planus 05/20/2019   • Gastroesophageal reflux disease 03/18/2019   • Hyperlipidemia 05/31/2017   • Panic disorder without agoraphobia 10/21/2016   • Situational phobia 10/21/2016   • Heartburn 10/14/2016   • Vitamin D deficiency 10/14/2016   • Multiple food allergies 08/12/2016   • Obesity (BMI 35.0-39.9 without comorbidity) 08/12/2016        Allergies:Hydromorphone; Iodine; Sugammadex; Tree nuts food allergy; and Sunflower oil    Current Outpatient Prescriptions   Medication Sig Dispense Refill   • Magnesium 100 MG Tab Take  by mouth.     • omeprazole (PRILOSEC) 20 MG delayed-release capsule Take 1 Cap by mouth every day. 90 Cap 3   • EPINEPHrine (EPIPEN INJ) 0.3 mg by Injection route as needed (anaphylaxis).     • vitamin D (CHOLECALCIFEROL) 1000 UNIT Tab Take 5,000 Units by mouth every day.     • Inulin (FIBER CHOICE FRUITY BITES) 1.5 g Chew Tab Take 1 Tab by mouth 1 time daily as needed.       No current facility-administered medications for this visit.        Social History   Substance Use Topics   • Smoking status: Never Smoker   • Smokeless tobacco: Former User     Quit date: 12/13/2011   • Alcohol use Yes      Comment: 2 times every other month     Social History     Social History Narrative   • No narrative on file       Family History   Problem Relation Age of Onset   • Hypertension Mother    • Anxiety disorder Mother    • Alcohol abuse Mother    • Diabetes Father    • Anxiety disorder Maternal Aunt    • Drug abuse Maternal Aunt    • Anxiety disorder Maternal Uncle        ROS:      Constitutional:  Negative for fever, chills, unexpected weight change, and fatigue/generalized weakness.  Respiratory: Negative for cough, sputum production, chest congestion, dyspnea, wheezing, and crackles.    Cardiovascular:Negative for chest pain, palpitations, orthopnea, and bilateral lower extremity edema.   Gastrointestinal: Positive for heartburn.  Genitourinary: Negative for dysuria, polyuria, hematuria, pyuria, urinary urgency, and urinary incontinence.         EXAM:   /72 (BP Location: Right arm, Patient Position: Sitting, BP Cuff Size: Large adult)   Pulse 77   Temp 37.1 °C (98.7 °F) (Temporal)   Resp 14   Ht 1.524 m (5')   Wt 90.3 kg (199 lb)   SpO2 95%  Body mass index is 38.86 kg/m².    Constitutional: Obese.  Well-developed and well-nourished. Not diaphoretic. No distress.   Skin: Skin is warm and dry. No rash noted.  Neck: Supple, trachea midline. No thyromegaly present. No cervical or supraclavicular lymphadenopathy.  Cardiovascular: Regular rate and rhythm.   Chest: Effort normal. Clear to auscultation throughout. No adventitious sounds.   Abdomen: Soft, non tender, and without distention. Active bowel sounds in all four quadrants. No rebound, guarding, masses or hepatosplenomegaly.  Neurological: Alert and oriented x 3.  Psychiatric:  Behavior, mood, and affect are appropriate.       ASSESSMENT/PLAN:    1. Gastroesophageal reflux disease, esophagitis presence not specified  This is a chronic and stable problem. Patient is doing well. No red flags. Continue PPI and monitor.      2. Mixed hyperlipidemia  New problem.  We have discussed dietary interventions.  Patient has been making improved changes including cutting back on saturated fats and sugars and processed foods.  Continue same.  She will have labs redrawn in 6 months prior to her next visit  - Comp Metabolic Panel; Future  - Lipid Profile; Future  - HEMOGLOBIN A1C; Future    3. Impaired fasting glucose  New problem.  We  have discussed potential for metformin use.  Patient would like to try lifestyle interventions including dietary changes and exercise and we will redraw her A1c in 6 months prior to her next visit and reevaluate metformin use at that time.  - Comp Metabolic Panel; Future  - Lipid Profile; Future  - HEMOGLOBIN A1C; Future    4. Bilateral pes planus  New problem.  - REFERRAL TO PODIATRY      5. Obesity (BMI 35.0-39.9 without comorbidity)  - OBESITY COUNSELING (No Charge): Patient identified as having weight management issue.  Appropriate orders and counseling given.      Return in about 6 months (around 11/20/2019).       Please note that this dictation was created using voice recognition software. I have made every reasonable attempt to correct obvious errors, but I expect that there are errors of grammar and possibly content that I did not discover before finalizing the note.

## 2019-07-17 ENCOUNTER — HOSPITAL ENCOUNTER (OUTPATIENT)
Facility: MEDICAL CENTER | Age: 29
End: 2019-07-17
Attending: NURSE PRACTITIONER
Payer: COMMERCIAL

## 2019-07-17 ENCOUNTER — OFFICE VISIT (OUTPATIENT)
Dept: URGENT CARE | Facility: PHYSICIAN GROUP | Age: 29
End: 2019-07-17
Payer: COMMERCIAL

## 2019-07-17 VITALS
WEIGHT: 193 LBS | HEIGHT: 60 IN | RESPIRATION RATE: 13 BRPM | OXYGEN SATURATION: 100 % | SYSTOLIC BLOOD PRESSURE: 110 MMHG | TEMPERATURE: 98.4 F | BODY MASS INDEX: 37.89 KG/M2 | HEART RATE: 106 BPM | DIASTOLIC BLOOD PRESSURE: 78 MMHG

## 2019-07-17 DIAGNOSIS — L03.316 CELLULITIS OF UMBILICUS: ICD-10-CM

## 2019-07-17 PROCEDURE — 87070 CULTURE OTHR SPECIMN AEROBIC: CPT

## 2019-07-17 PROCEDURE — 99214 OFFICE O/P EST MOD 30 MIN: CPT | Performed by: NURSE PRACTITIONER

## 2019-07-17 PROCEDURE — 87075 CULTR BACTERIA EXCEPT BLOOD: CPT

## 2019-07-17 PROCEDURE — 87077 CULTURE AEROBIC IDENTIFY: CPT

## 2019-07-17 PROCEDURE — 87205 SMEAR GRAM STAIN: CPT

## 2019-07-17 RX ORDER — AMOXICILLIN AND CLAVULANATE POTASSIUM 875; 125 MG/1; MG/1
1 TABLET, FILM COATED ORAL 2 TIMES DAILY
Qty: 14 TAB | Refills: 0 | Status: SHIPPED | OUTPATIENT
Start: 2019-07-17 | End: 2019-07-24

## 2019-07-17 ASSESSMENT — ENCOUNTER SYMPTOMS
CONSTITUTIONAL NEGATIVE: 1
ABDOMINAL PAIN: 0
CHILLS: 0
CARDIOVASCULAR NEGATIVE: 1
RESPIRATORY NEGATIVE: 1
NEUROLOGICAL NEGATIVE: 1
FEVER: 0

## 2019-07-17 ASSESSMENT — PAIN SCALES - GENERAL: PAINLEVEL: NO PAIN

## 2019-07-18 LAB
GRAM STN SPEC: NORMAL
SIGNIFICANT IND 70042: NORMAL
SITE SITE: NORMAL
SOURCE SOURCE: NORMAL

## 2019-07-18 NOTE — PROGRESS NOTES
Subjective:     Elinor Breaux is a 29 y.o. female who presents for Wound Infection (Belly button, hx of previous infection there in april. on and off )        Wound Infection    This is a new problem. Episode onset: 2 weeks ago.  The problem has been gradually worsening. Pertinent negatives include no abdominal pain, chills or fever.     Patient evaluated in urgent care on 3/1/2019 with similar symptoms.  Patient was started on a course of Bactrim DS but was subsequently switched to Augmentin her wound culture tested positive for Peptostreptococcus.  Patient reports that her symptoms resolved after finishing the Augmentin.  2 weeks ago patient started to notice redness of her umbilicus with purulent discharge.    PMH:  has a past medical history of Allergy; Anxiety; and Asthma.    MEDS:   Current Outpatient Prescriptions:   •  amoxicillin-clavulanate (AUGMENTIN) 875-125 MG Tab, Take 1 Tab by mouth 2 times a day for 7 days., Disp: 14 Tab, Rfl: 0  •  Magnesium 100 MG Tab, Take  by mouth., Disp: , Rfl:   •  vitamin D (CHOLECALCIFEROL) 1000 UNIT Tab, Take 5,000 Units by mouth every day., Disp: , Rfl:   •  omeprazole (PRILOSEC) 20 MG delayed-release capsule, Take 1 Cap by mouth every day., Disp: 90 Cap, Rfl: 3  •  EPINEPHrine (EPIPEN INJ), 0.3 mg by Injection route as needed (anaphylaxis)., Disp: , Rfl:   •  Inulin (FIBER CHOICE FRUITY BITES) 1.5 g Chew Tab, Take 1 Tab by mouth 1 time daily as needed., Disp: , Rfl:     ALLERGIES:   Allergies   Allergen Reactions   • Hydromorphone Anaphylaxis     Anaphylactic reaction at end of surgery 12/13/17. Will try to rule out Dilaudid vs. Sugammadex.    • Iodine Anaphylaxis     Seafood allergy    • Sugammadex Anaphylaxis     Anaphylaxitic reaction at end of surgery 12/13/17. Will try to rule out Dilaudid vs. Sugammadex.   • Tree Nuts Food Allergy Anaphylaxis     Peanut allergy   • Sunflower Oil      SURGHX:   Past Surgical History:   Procedure Laterality Date   • ELZA BY  LAPAROSCOPY  12/13/2017    Procedure: ELZA BY LAPAROSCOPY;  Surgeon: Jordi Peña M.D.;  Location: SURGERY [facility];  Service: General     SOCHX:  reports that she has never smoked. She quit smokeless tobacco use about 7 years ago. She reports that she drinks alcohol. She reports that she does not use drugs.     FH: Reviewed with patient, not pertinent to this visit.    Review of Systems   Constitutional: Negative.  Negative for chills, fever and malaise/fatigue.   Respiratory: Negative.    Cardiovascular: Negative.    Gastrointestinal: Negative for abdominal pain.   Genitourinary: Negative.    Skin:        Redness and purulent discharge at umbilicus   Neurological: Negative.    All other systems reviewed and are negative.    Objective:     /78   Pulse (!) 106   Temp 36.9 °C (98.4 °F)   Resp 13   Ht 1.524 m (5')   Wt 87.5 kg (193 lb)   SpO2 100%   BMI 37.69 kg/m²      Physical Exam   Constitutional: She is oriented to person, place, and time. She appears well-developed and well-nourished. She is cooperative.  Non-toxic appearance. No distress.   HENT:   Right Ear: External ear normal.   Left Ear: External ear normal.   Nose: Nose normal.   Mouth/Throat: Mucous membranes are normal.   Eyes: Conjunctivae and EOM are normal.   Neck: Normal range of motion.   Cardiovascular: Regular rhythm, normal heart sounds and normal pulses.  Tachycardia present.    Pulmonary/Chest: Effort normal and breath sounds normal. No respiratory distress. She has no decreased breath sounds.   Abdominal: Bowel sounds are normal.   Musculoskeletal: Normal range of motion. She exhibits no deformity.   Neurological: She is alert and oriented to person, place, and time. She has normal strength. No sensory deficit.   Skin: Skin is warm and dry. Capillary refill takes less than 2 seconds.        Psychiatric: She has a normal mood and affect. Her behavior is normal.   Vitals reviewed.       Assessment/Plan:     1. Cellulitis  of umbilicus  - amoxicillin-clavulanate (AUGMENTIN) 875-125 MG Tab; Take 1 Tab by mouth 2 times a day for 7 days.  Dispense: 14 Tab; Refill: 0  - ANAEROBIC/AEROBIC/GRAM STAIN    Site irrigated with NS, dressing applied. Rx sent electronically for Augmentin. Wound culture pending.    Patient advised to monitor for signs of worsening infection including, but not limited to, increased redness, warmth, pain, swelling, discharge, or fever.    Discussed close monitoring and supportive measures including increasing fluids and rest as well as OTC symptom management including acetaminophen and/or ibuprofen PRN pain and/or fever.    Patient advised to: Return for 1) Symptoms don't improve or worsen, or go to ER, 2) Follow up with primary care in 7-10 days.    Differential diagnosis, natural history, supportive care, and indications for immediate follow-up discussed. All questions answered. Patient agrees with the plan of care.

## 2019-07-20 LAB
BACTERIA WND AEROBE CULT: ABNORMAL
BACTERIA WND AEROBE CULT: ABNORMAL
GRAM STN SPEC: ABNORMAL
SIGNIFICANT IND 70042: ABNORMAL
SITE SITE: ABNORMAL
SOURCE SOURCE: ABNORMAL

## 2019-07-21 LAB
BACTERIA SPEC ANAEROBE CULT: NORMAL
SIGNIFICANT IND 70042: NORMAL
SITE SITE: NORMAL
SOURCE SOURCE: NORMAL

## 2019-09-14 ENCOUNTER — HOSPITAL ENCOUNTER (OUTPATIENT)
Dept: LAB | Facility: MEDICAL CENTER | Age: 29
End: 2019-09-14
Attending: NURSE PRACTITIONER
Payer: COMMERCIAL

## 2019-09-14 DIAGNOSIS — E78.2 MIXED HYPERLIPIDEMIA: ICD-10-CM

## 2019-09-14 DIAGNOSIS — R73.01 IMPAIRED FASTING GLUCOSE: ICD-10-CM

## 2019-09-14 LAB
ALBUMIN SERPL BCP-MCNC: 4.1 G/DL (ref 3.2–4.9)
ALBUMIN/GLOB SERPL: 1.3 G/DL
ALP SERPL-CCNC: 75 U/L (ref 30–99)
ALT SERPL-CCNC: 18 U/L (ref 2–50)
ANION GAP SERPL CALC-SCNC: 9 MMOL/L (ref 0–11.9)
AST SERPL-CCNC: 12 U/L (ref 12–45)
BILIRUB SERPL-MCNC: 0.3 MG/DL (ref 0.1–1.5)
BUN SERPL-MCNC: 14 MG/DL (ref 8–22)
CALCIUM SERPL-MCNC: 9 MG/DL (ref 8.5–10.5)
CHLORIDE SERPL-SCNC: 107 MMOL/L (ref 96–112)
CHOLEST SERPL-MCNC: 190 MG/DL (ref 100–199)
CO2 SERPL-SCNC: 22 MMOL/L (ref 20–33)
CREAT SERPL-MCNC: 0.74 MG/DL (ref 0.5–1.4)
EST. AVERAGE GLUCOSE BLD GHB EST-MCNC: 123 MG/DL
FASTING STATUS PATIENT QL REPORTED: NORMAL
GLOBULIN SER CALC-MCNC: 3.1 G/DL (ref 1.9–3.5)
GLUCOSE SERPL-MCNC: 113 MG/DL (ref 65–99)
HBA1C MFR BLD: 5.9 % (ref 0–5.6)
HDLC SERPL-MCNC: 45 MG/DL
LDLC SERPL CALC-MCNC: 123 MG/DL
POTASSIUM SERPL-SCNC: 4 MMOL/L (ref 3.6–5.5)
PROT SERPL-MCNC: 7.2 G/DL (ref 6–8.2)
SODIUM SERPL-SCNC: 138 MMOL/L (ref 135–145)
TRIGL SERPL-MCNC: 109 MG/DL (ref 0–149)

## 2019-09-14 PROCEDURE — 80061 LIPID PANEL: CPT

## 2019-09-14 PROCEDURE — 83036 HEMOGLOBIN GLYCOSYLATED A1C: CPT

## 2019-09-14 PROCEDURE — 36415 COLL VENOUS BLD VENIPUNCTURE: CPT

## 2019-09-14 PROCEDURE — 80053 COMPREHEN METABOLIC PANEL: CPT

## 2019-10-03 ENCOUNTER — OFFICE VISIT (OUTPATIENT)
Dept: MEDICAL GROUP | Facility: LAB | Age: 29
End: 2019-10-03
Payer: COMMERCIAL

## 2019-10-03 VITALS
SYSTOLIC BLOOD PRESSURE: 118 MMHG | WEIGHT: 199 LBS | BODY MASS INDEX: 39.07 KG/M2 | HEART RATE: 84 BPM | OXYGEN SATURATION: 96 % | HEIGHT: 60 IN | TEMPERATURE: 98.8 F | DIASTOLIC BLOOD PRESSURE: 78 MMHG | RESPIRATION RATE: 16 BRPM

## 2019-10-03 DIAGNOSIS — K21.9 GASTROESOPHAGEAL REFLUX DISEASE, ESOPHAGITIS PRESENCE NOT SPECIFIED: ICD-10-CM

## 2019-10-03 DIAGNOSIS — E78.2 MIXED HYPERLIPIDEMIA: ICD-10-CM

## 2019-10-03 DIAGNOSIS — R73.01 IMPAIRED FASTING GLUCOSE: ICD-10-CM

## 2019-10-03 DIAGNOSIS — E66.9 OBESITY (BMI 35.0-39.9 WITHOUT COMORBIDITY): ICD-10-CM

## 2019-10-03 PROCEDURE — 99214 OFFICE O/P EST MOD 30 MIN: CPT | Performed by: NURSE PRACTITIONER

## 2019-10-03 NOTE — ASSESSMENT & PLAN NOTE
Chronic stable. Started food and exercise.  Going to gym 5 days a week. Running on treadmill at [employer]. Walking in the evening.     Patient and I then discussed necessary dietary changes to make to address dyslipidemia.  Patient verbalized understanding.    ROS is NEGATIVE for dizziness, generalized weakness/fatigue, vision/hearing changes, jaw pain/paresthesias, BUE pain/paresthesias/numbness/weakness, chest pain/pressure, palpitations, dyspnea, RUQ abdominal pain, oliguria/anuria, BLE edema.    Lab Results   Component Value Date/Time    CHOLSTRLTOT 190 09/14/2019 07:52 AM     (H) 09/14/2019 07:52 AM    HDL 45 09/14/2019 07:52 AM    TRIGLYCERIDE 109 09/14/2019 07:52 AM

## 2019-10-03 NOTE — PROGRESS NOTES
CC:There were no encounter diagnoses.    HISTORY OF PRESENT ILLNESS: Elinor Breaux is a 29 y.o. female established patient who presents today to discuss the following problems:    Impaired fasting glucose  Chronic improving problem.  Patient's most recent A1c is 5.9.which is down from 6.1.  She has changed her diet and is exercising regularly.  We discussed continuing with these dietary changes and following up in 6 months.  She has not lost any weight however she is consistently working on this.  Lab Results   Component Value Date/Time    HBA1C 5.9 (H) 09/14/2019 07:52 AM          Hyperlipidemia  Chronic stable.  Patient has been making significant dietary changes including lowering her carbohydrate intake which has resulted in her triglyceride level normalizing.  She is also going to gym 5 days a week. Running on treadmill at [employer]. Walking in the evening.     Patient and I then discussed necessary dietary changes to make to address dyslipidemia.  Patient verbalized understanding.    ROS is NEGATIVE for dizziness, generalized weakness/fatigue, vision/hearing changes, jaw pain/paresthesias, BUE pain/paresthesias/numbness/weakness, chest pain/pressure, palpitations, dyspnea, RUQ abdominal pain, oliguria/anuria, BLE edema.    Lab Results   Component Value Date/Time    CHOLSTRLTOT 190 09/14/2019 07:52 AM     (H) 09/14/2019 07:52 AM    HDL 45 09/14/2019 07:52 AM    TRIGLYCERIDE 109 09/14/2019 07:52 AM         Gastroesophageal reflux disease  This is chronic. Taking PPI daily as directed first thing in the morning. No early satiety, unintentional weight loss, choking, melena, hematochezia, persistent burning pain in chest or upper abdomen.       Obesity.  This is chronic uncontrolled.  Patient has not lost weight from her prior visit however she is making better food choices and exercising regularly.  She is encouraged to continue with her lifestyle interventions and we will recheck her in 6 months.   She  declines referral to health improvement program at this time.    Health Maintenance: Completed    Patient Active Problem List    Diagnosis Date Noted   • Impaired fasting glucose 05/20/2019   • Bilateral pes planus 05/20/2019   • Gastroesophageal reflux disease 03/18/2019   • Hyperlipidemia 05/31/2017   • Panic disorder without agoraphobia 10/21/2016   • Situational phobia 10/21/2016   • Heartburn 10/14/2016   • Vitamin D deficiency 10/14/2016   • Multiple food allergies 08/12/2016   • Obesity (BMI 35.0-39.9 without comorbidity) 08/12/2016        Allergies:Hydromorphone; Iodine; Sugammadex; Tree nuts food allergy; and Sunflower oil    Current Outpatient Medications   Medication Sig Dispense Refill   • Magnesium 100 MG Tab Take  by mouth.     • omeprazole (PRILOSEC) 20 MG delayed-release capsule Take 1 Cap by mouth every day. 90 Cap 3   • EPINEPHrine (EPIPEN INJ) 0.3 mg by Injection route as needed (anaphylaxis).     • vitamin D (CHOLECALCIFEROL) 1000 UNIT Tab Take 5,000 Units by mouth every day.     • Inulin (FIBER CHOICE FRUITY BITES) 1.5 g Chew Tab Take 1 Tab by mouth 1 time daily as needed.       No current facility-administered medications for this visit.        Social History     Tobacco Use   • Smoking status: Never Smoker   • Smokeless tobacco: Former User   Substance Use Topics   • Alcohol use: Yes     Comment: 2 times every other month   • Drug use: No     Social History     Social History Narrative   • Not on file       Family History   Problem Relation Age of Onset   • Hypertension Mother    • Anxiety disorder Mother    • Alcohol abuse Mother    • Diabetes Father    • Anxiety disorder Maternal Aunt    • Drug abuse Maternal Aunt    • Anxiety disorder Maternal Uncle        No fevers or weight loss  No headaches or sore throat  No chest pain or shortness of breath  No bowel changes  No lower extremity edema  No suicidal ideation or panic attack          EXAM:   /78 (BP Location: Right arm,  Patient Position: Sitting, BP Cuff Size: Adult)   Pulse 84   Temp 37.1 °C (98.8 °F) (Temporal)   Resp 16   Ht 1.524 m (5')   Wt 90.3 kg (199 lb)   SpO2 96%  There is no height or weight on file to calculate BMI.    Constitutional: Obese.  Well-developed and well-nourished. Not diaphoretic. No distress.   Skin: Skin is warm and dry. No rash noted.  Head: Atraumatic without lesions.  Neck: Supple, trachea midline. No thyromegaly present. No cervical or supraclavicular lymphadenopathy.  Cardiovascular: Regular rate and rhythm.   Chest: Effort normal. Clear to auscultation throughout. No adventitious sounds.   Abdomen: Soft, non tender, and without distention. Active bowel sounds in all four quadrants. No rebound, guarding, masses or hepatosplenomegaly.  Extremities: No cyanosis, clubbing, erythema, nor edema.   Neurological: Alert and oriented x 3. Sensation intact.   Psychiatric:  Behavior, mood, and affect are appropriate.       ASSESSMENT/PLAN:  1. Impaired fasting glucose  Chronic improved.  A1c down to 5.9 from 6.1  Continue lifestyle changes as discussed. F/U in 6 months.  - Comp Metabolic Panel; Future  - HEMOGLOBIN A1C; Future  - Lipid Profile; Future    2. Mixed hyperlipidemia  Chronic stable. High cholesterol - advised patient low fat/cholesterol diet with >25 g fiber daily.  No refined carbohydrates and >9 servings of fresh fruits and vegetables.   We discussed in depth the importance of primary prevention of coronary disease with aggressive treatment of high cholesterol. I also educated the patient about lifestyle modifications which may improve blood pressure.    - Comp Metabolic Panel; Future  - HEMOGLOBIN A1C; Future  - Lipid Profile; Future    3. Obesity (BMI 35.0-39.9 without comorbidity)  Chronic, uncontrolled, patient advised to pursue lifestyle changes, particularly cardiovascular exercise and increasing proportion of plant-based nutrition.    - Comp Metabolic Panel; Future  - HEMOGLOBIN A1C;  Future  - Lipid Profile; Future    4. Gastroesophageal reflux disease, esophagitis presence not specified  This is a chronic and stable problem. Patient is doing well. No red flags. Continue PPI and monitor.    - Comp Metabolic Panel; Future  - HEMOGLOBIN A1C; Future  - Lipid Profile; Future        Return in about 6 months (around 4/3/2020).       Please note that this dictation was created using voice recognition software. I have made every reasonable attempt to correct obvious errors, but I expect that there are errors of grammar and possibly content that I did not discover before finalizing the note.

## 2019-11-24 ENCOUNTER — OFFICE VISIT (OUTPATIENT)
Dept: URGENT CARE | Facility: PHYSICIAN GROUP | Age: 29
End: 2019-11-24
Payer: COMMERCIAL

## 2019-11-24 VITALS
HEIGHT: 60 IN | HEART RATE: 93 BPM | WEIGHT: 200 LBS | OXYGEN SATURATION: 96 % | TEMPERATURE: 96.7 F | BODY MASS INDEX: 39.27 KG/M2 | DIASTOLIC BLOOD PRESSURE: 70 MMHG | SYSTOLIC BLOOD PRESSURE: 102 MMHG | RESPIRATION RATE: 16 BRPM

## 2019-11-24 DIAGNOSIS — L08.9 SKIN INFECTION: ICD-10-CM

## 2019-11-24 PROCEDURE — 99214 OFFICE O/P EST MOD 30 MIN: CPT | Performed by: PHYSICIAN ASSISTANT

## 2019-11-24 RX ORDER — SULFAMETHOXAZOLE AND TRIMETHOPRIM 800; 160 MG/1; MG/1
1 TABLET ORAL 2 TIMES DAILY
Qty: 14 TAB | Refills: 0 | Status: SHIPPED | OUTPATIENT
Start: 2019-11-24 | End: 2019-12-01

## 2019-11-24 ASSESSMENT — ENCOUNTER SYMPTOMS
MYALGIAS: 0
SORE THROAT: 0
PALPITATIONS: 0
SHORTNESS OF BREATH: 0
COUGH: 0
FEVER: 0

## 2019-11-24 NOTE — PROGRESS NOTES
Subjective:      Elinor Breaux is a 29 y.o. female who presents with Wound Infection ([id number] )            Wound Infection   This is a recurrent problem. The current episode started in the past 7 days. The problem occurs constantly. Associated symptoms include a rash. Pertinent negatives include no chest pain, coughing, fever, myalgias or sore throat. Associated symptoms comments: Bellybutton drainage  . Nothing aggravates the symptoms. She has tried nothing for the symptoms.       Review of Systems   Constitutional: Negative for fever and malaise/fatigue.   HENT: Negative for sore throat.    Respiratory: Negative for cough and shortness of breath.    Cardiovascular: Negative for chest pain and palpitations.   Musculoskeletal: Negative for joint pain and myalgias.   Skin: Positive for rash.   All other systems reviewed and are negative.    PMH:  has a past medical history of Allergy, Anxiety, and Asthma.  MEDS:   Current Outpatient Medications:   •  mupirocin (BACTROBAN) 2 % Ointment, Apply 1 cm to affected area(s) 3 times a day for 14 days., Disp: 1 Tube, Rfl: 0  •  sulfamethoxazole-trimethoprim (BACTRIM DS) 800-160 MG tablet, Take 1 Tab by mouth 2 times a day for 7 days., Disp: 14 Tab, Rfl: 0  •  Magnesium 100 MG Tab, Take  by mouth., Disp: , Rfl:   •  omeprazole (PRILOSEC) 20 MG delayed-release capsule, Take 1 Cap by mouth every day., Disp: 90 Cap, Rfl: 3  •  EPINEPHrine (EPIPEN INJ), 0.3 mg by Injection route as needed (anaphylaxis)., Disp: , Rfl:   •  vitamin D (CHOLECALCIFEROL) 1000 UNIT Tab, Take 5,000 Units by mouth every day., Disp: , Rfl:   •  Inulin (FIBER CHOICE FRUITY BITES) 1.5 g Chew Tab, Take 1 Tab by mouth 1 time daily as needed., Disp: , Rfl:   ALLERGIES:   Allergies   Allergen Reactions   • Hydromorphone Anaphylaxis     Anaphylactic reaction at end of surgery 12/13/17. Will try to rule out Dilaudid vs. Sugammadex.    • Iodine Anaphylaxis     Seafood allergy    • Sugammadex  Anaphylaxis     Anaphylaxitic reaction at end of surgery 12/13/17. Will try to rule out Dilaudid vs. Sugammadex.   • Tree Nuts Food Allergy Anaphylaxis     Peanut allergy   • Sunflower Oil      SURGHX:   Past Surgical History:   Procedure Laterality Date   • ELZA BY LAPAROSCOPY  12/13/2017    Procedure: ELZA BY LAPAROSCOPY;  Surgeon: Jordi Peña M.D.;  Location: SURGERY [facility];  Service: General     SOCHX:  reports that she has never smoked. She quit smokeless tobacco use about 7 years ago. She reports previous alcohol use. She reports that she does not use drugs.  FH: Family history was reviewed, no pertinent findings to report  Medications, Allergies, and current problem list reviewed today in Epic       Objective:     Blood Pressure 102/70   Pulse 93   Temperature 35.9 °C (96.7 °F) (Temporal)   Respiration 16   Height 1.524 m (5')   Weight 90.7 kg (200 lb)   Oxygen Saturation 96%   Body Mass Index 39.06 kg/m²      Physical Exam  Vitals signs reviewed.   Constitutional:       Appearance: She is well-developed.   Cardiovascular:      Rate and Rhythm: Normal rate and regular rhythm.      Heart sounds: Normal heart sounds.   Pulmonary:      Effort: Pulmonary effort is normal.      Breath sounds: Normal breath sounds.   Musculoskeletal: Normal range of motion.   Skin:     General: Skin is warm and dry.      Findings: Rash present.      Comments: Rash distributions:  Located:umbilicus  Size: 1 cm  Pattern: cellulitis with drainage.   Neurological:      Mental Status: She is alert and oriented to person, place, and time.   Psychiatric:         Behavior: Behavior normal.         Thought Content: Thought content normal.         Judgment: Judgment normal.                 Assessment/Plan:       1. Skin infection    - mupirocin (BACTROBAN) 2 % Ointment; Apply 1 cm to affected area(s) 3 times a day for 14 days.  Dispense: 1 Tube; Refill: 0  - sulfamethoxazole-trimethoprim (BACTRIM DS) 800-160 MG tablet;  Take 1 Tab by mouth 2 times a day for 7 days.  Dispense: 14 Tab; Refill: 0    Differential diagnosis, natural history, supportive care discussed. Follow-up with primary care provider within 7-10 days, emergency room precautions discussed.  Patient and/or family appears understanding of information.  Handout and review of patients diagnosis and treatment was discussed extensively.

## 2019-12-02 DIAGNOSIS — R19.8 UMBILICAL DISCHARGE: ICD-10-CM

## 2019-12-06 ENCOUNTER — HOSPITAL ENCOUNTER (OUTPATIENT)
Dept: RADIOLOGY | Facility: MEDICAL CENTER | Age: 29
End: 2019-12-06
Attending: NURSE PRACTITIONER
Payer: COMMERCIAL

## 2019-12-06 DIAGNOSIS — R19.8 UMBILICAL DISCHARGE: ICD-10-CM

## 2019-12-06 PROCEDURE — 76705 ECHO EXAM OF ABDOMEN: CPT

## 2019-12-09 DIAGNOSIS — R19.8 UMBILICAL DISCHARGE: ICD-10-CM

## 2019-12-20 ENCOUNTER — HOSPITAL ENCOUNTER (OUTPATIENT)
Dept: RADIOLOGY | Facility: MEDICAL CENTER | Age: 29
End: 2019-12-20
Attending: NURSE PRACTITIONER
Payer: COMMERCIAL

## 2019-12-20 DIAGNOSIS — R19.8 UMBILICAL DISCHARGE: ICD-10-CM

## 2019-12-20 PROCEDURE — 74150 CT ABDOMEN W/O CONTRAST: CPT

## 2019-12-26 DIAGNOSIS — K42.9 UMBILICAL HERNIA WITHOUT OBSTRUCTION AND WITHOUT GANGRENE: ICD-10-CM

## 2020-04-09 ENCOUNTER — OFFICE VISIT (OUTPATIENT)
Dept: URGENT CARE | Facility: PHYSICIAN GROUP | Age: 30
End: 2020-04-09
Payer: COMMERCIAL

## 2020-04-09 VITALS
WEIGHT: 189 LBS | OXYGEN SATURATION: 97 % | HEART RATE: 117 BPM | BODY MASS INDEX: 37.11 KG/M2 | SYSTOLIC BLOOD PRESSURE: 120 MMHG | DIASTOLIC BLOOD PRESSURE: 68 MMHG | RESPIRATION RATE: 16 BRPM | HEIGHT: 60 IN | TEMPERATURE: 98.3 F

## 2020-04-09 DIAGNOSIS — K52.9 COLITIS: ICD-10-CM

## 2020-04-09 LAB
INT CON NEG: NEGATIVE
INT CON POS: POSITIVE
POC URINE PREGNANCY TEST: NORMAL

## 2020-04-09 PROCEDURE — 99214 OFFICE O/P EST MOD 30 MIN: CPT | Performed by: FAMILY MEDICINE

## 2020-04-09 PROCEDURE — 81025 URINE PREGNANCY TEST: CPT | Performed by: FAMILY MEDICINE

## 2020-04-09 RX ORDER — DIPHENOXYLATE HYDROCHLORIDE AND ATROPINE SULFATE 2.5; .025 MG/1; MG/1
1 TABLET ORAL 4 TIMES DAILY PRN
Qty: 15 TAB | Refills: 0 | Status: SHIPPED | OUTPATIENT
Start: 2020-04-09 | End: 2020-04-13

## 2020-04-09 RX ORDER — CIPROFLOXACIN 500 MG/1
500 TABLET, FILM COATED ORAL 2 TIMES DAILY
Qty: 6 TAB | Refills: 0 | Status: SHIPPED | OUTPATIENT
Start: 2020-04-09 | End: 2020-04-12

## 2020-04-09 RX ORDER — ONDANSETRON 4 MG/1
4 TABLET, ORALLY DISINTEGRATING ORAL EVERY 6 HOURS PRN
COMMUNITY
End: 2020-06-05

## 2020-04-09 ASSESSMENT — ENCOUNTER SYMPTOMS
CHILLS: 0
NAUSEA: 1
ABDOMINAL PAIN: 0
FEVER: 0
VOMITING: 0

## 2020-04-09 ASSESSMENT — FIBROSIS 4 INDEX: FIB4 SCORE: 0.24

## 2020-04-09 NOTE — PATIENT INSTRUCTIONS
Food Choices to Help Relieve Diarrhea, Adult  When you have diarrhea, the foods you eat and your eating habits are very important. Choosing the right foods and drinks can help relieve diarrhea. Also, because diarrhea can last up to 7 days, you need to replace lost fluids and electrolytes (such as sodium, potassium, and chloride) in order to help prevent dehydration.   WHAT GENERAL GUIDELINES DO I NEED TO FOLLOW?  · Slowly drink 1 cup (8 oz) of fluid for each episode of diarrhea. If you are getting enough fluid, your urine will be clear or pale yellow.  · Eat starchy foods. Some good choices include white rice, white toast, pasta, low-fiber cereal, baked potatoes (without the skin), saltine crackers, and bagels.  · Avoid large servings of any cooked vegetables.  · Limit fruit to two servings per day. A serving is ½ cup or 1 small piece.  · Choose foods with less than 2 g of fiber per serving.  · Limit fats to less than 8 tsp (38 g) per day.  · Avoid fried foods.  · Eat foods that have probiotics in them. Probiotics can be found in certain dairy products.  · Avoid foods and beverages that may increase the speed at which food moves through the stomach and intestines (gastrointestinal tract). Things to avoid include:  ¨ High-fiber foods, such as dried fruit, raw fruits and vegetables, nuts, seeds, and whole grain foods.  ¨ Spicy foods and high-fat foods.  ¨ Foods and beverages sweetened with high-fructose corn syrup, honey, or sugar alcohols such as xylitol, sorbitol, and mannitol.  WHAT FOODS ARE RECOMMENDED?  Grains  White rice. White, [place], or nick breads (fresh or toasted), including plain rolls, buns, or bagels. White pasta. Saltine, soda, or yuki crackers. Pretzels. Low-fiber cereal. Cooked cereals made with water (such as cornmeal, farina, or cream cereals). Plain muffins. Matzo. Mirna toast. Zwieback.   Vegetables  Potatoes (without the skin). Strained tomato and vegetable juices. Most well-cooked and canned  vegetables without seeds. Tender lettuce.  Fruits  Cooked or canned applesauce, apricots, cherries, fruit cocktail, grapefruit, peaches, pears, or plums. Fresh bananas, apples without skin, cherries, grapes, cantaloupe, grapefruit, peaches, oranges, or plums.   Meat and Other Protein Products  Baked or boiled chicken. Eggs. Tofu. Fish. Seafood. Smooth peanut butter. Ground or well-cooked tender beef, ham, veal, lamb, pork, or poultry.   Dairy  Plain yogurt, kefir, and unsweetened liquid yogurt. Lactose-free milk, buttermilk, or soy milk. Plain hard cheese.  Beverages  Sport drinks. Clear broths. Diluted fruit juices (except prune). Regular, caffeine-free sodas such as ginger ale. Water. Decaffeinated teas. Oral rehydration solutions. Sugar-free beverages not sweetened with sugar alcohols.  Other  Bouillon, broth, or soups made from recommended foods.   The items listed above may not be a complete list of recommended foods or beverages. Contact your dietitian for more options.  WHAT FOODS ARE NOT RECOMMENDED?  Grains  Whole grain, whole wheat, bran, or rye breads, rolls, pastas, crackers, and cereals. Wild or brown rice. Cereals that contain more than 2 g of fiber per serving. Corn tortillas or taco shells. Cooked or dry oatmeal. Granola. Popcorn.  Vegetables  Raw vegetables. Cabbage, broccoli, [place] sprouts, artichokes, baked beans, beet greens, corn, kale, legumes, peas, sweet potatoes, and yams. Potato skins. Cooked spinach and cabbage.  Fruits  Dried fruit, including raisins and dates. Raw fruits. Stewed or dried prunes. Fresh apples with skin, apricots, mangoes, pears, raspberries, and strawberries.   Meat and Other Protein Products  Bates peanut butter. Nuts and seeds. Beans and lentils. Brewer.   Dairy  High-fat cheeses. Milk, chocolate milk, and beverages made with milk, such as milk shakes. Cream. Ice cream.  Sweets and Desserts  Sweet rolls, doughnuts, and sweet breads. Pancakes and waffles.  Fats and  Oils  Butter. Cream sauces. Margarine. Salad oils. Plain salad dressings. Olives. Avocados.   Beverages  Caffeinated beverages (such as coffee, tea, soda, or energy drinks). Alcoholic beverages. Fruit juices with pulp. Prune juice. Soft drinks sweetened with high-fructose corn syrup or sugar alcohols.  Other  Coconut. Hot sauce. Chili powder. Mayonnaise. Gravy. Cream-based or milk-based soups.   The items listed above may not be a complete list of foods and beverages to avoid. Contact your dietitian for more information.  WHAT SHOULD I DO IF I BECOME DEHYDRATED?  Diarrhea can sometimes lead to dehydration. Signs of dehydration include dark urine and dry mouth and skin. If you think you are dehydrated, you should rehydrate with an oral rehydration solution. These solutions can be purchased at pharmacies, retail stores, or online.   Drink ½-1 cup (120-240 mL) of oral rehydration solution each time you have an episode of diarrhea. If drinking this amount makes your diarrhea worse, try drinking smaller amounts more often. For example, drink 1-3 tsp (5-15 mL) every 5-10 minutes.   A general rule for staying hydrated is to drink 1½-2 L of fluid per day. Talk to your health care provider about the specific amount you should be drinking each day. Drink enough fluids to keep your urine clear or pale yellow.     This information is not intended to replace advice given to you by your health care provider. Make sure you discuss any questions you have with your health care provider.     Document Released: 03/09/2005 Document Revised: 01/08/2016 Document Reviewed: 11/10/2014  [employer] Interactive Patient Education ©2016 [employer]

## 2020-04-09 NOTE — PROGRESS NOTES
Subjective:   Elinor Breaux is a 29 y.o. female who presents for Nausea (diarrhea, x1 week, had seen [facility]LL[facility], not getting better)     Nausea   This is a new problem. The current episode started in the past 7 days. The problem occurs constantly. The problem has been gradually worsening. Associated symptoms include nausea. Pertinent negatives include no abdominal pain, chills, congestion, fever or vomiting.     Review of Systems   Constitutional: Negative for chills and fever.   HENT: Negative for congestion.    Gastrointestinal: Positive for nausea. Negative for abdominal pain and vomiting.      Allergies   Allergen Reactions   • Hydromorphone Anaphylaxis     Anaphylactic reaction at end of surgery 12/13/17. Will try to rule out Dilaudid vs. Sugammadex.    • Iodine Anaphylaxis     Seafood allergy    • Sugammadex Anaphylaxis     Anaphylaxitic reaction at end of surgery 12/13/17. Will try to rule out Dilaudid vs. Sugammadex.   • Tree Nuts Food Allergy Anaphylaxis     Peanut allergy   • Sunflower Oil        Objective:   /68   Pulse (!) 117   Temp 36.8 °C (98.3 °F) (Temporal)   Resp 16   Ht 1.524 m (5')   Wt 85.7 kg (189 lb)   LMP 04/05/2020   SpO2 97%   BMI 36.91 kg/m²   Physical Exam  Constitutional:       General: She is not in acute distress.     Appearance: She is well-developed.   HENT:      Head: Normocephalic and atraumatic.   Eyes:      Conjunctiva/sclera: Conjunctivae normal.      Pupils: Pupils are equal, round, and reactive to light.   Cardiovascular:      Rate and Rhythm: Normal rate and regular rhythm.      Heart sounds: No murmur.   Pulmonary:      Effort: Pulmonary effort is normal. No respiratory distress.      Breath sounds: Normal breath sounds.   Abdominal:      General: There is no distension.      Palpations: Abdomen is soft.      Tenderness: There is no abdominal tenderness.   Skin:     General: Skin is warm and dry.   Neurological:      Mental Status: She is alert and oriented  to person, place, and time.      Sensory: No sensory deficit.      Deep Tendon Reflexes: Reflexes are normal and symmetric.          Assessment/Plan:   1. Colitis  - diphenoxylate-atropine (LOMOTIL) 2.5-0.025 MG Tab; Take 1 Tab by mouth 4 times a day as needed for Diarrhea for up to 4 days.  Dispense: 15 Tab; Refill: 0  - POCT Pregnancy  - ciprofloxacin (CIPRO) 500 MG Tab; Take 1 Tab by mouth 2 times a day for 3 days.  Dispense: 6 Tab; Refill: 0    Other orders  - ondansetron (ZOFRAN ODT) 4 MG TABLET DISPERSIBLE; Take 4 mg by mouth every 6 hours as needed for Nausea.    Differential diagnosis, natural history, supportive care, and indications for immediate follow-up discussed.

## 2020-06-05 ENCOUNTER — OFFICE VISIT (OUTPATIENT)
Dept: MEDICAL GROUP | Facility: PHYSICIAN GROUP | Age: 30
End: 2020-06-05
Payer: COMMERCIAL

## 2020-06-05 VITALS
TEMPERATURE: 97.9 F | DIASTOLIC BLOOD PRESSURE: 78 MMHG | SYSTOLIC BLOOD PRESSURE: 120 MMHG | HEIGHT: 61 IN | HEART RATE: 81 BPM | WEIGHT: 192 LBS | RESPIRATION RATE: 16 BRPM | BODY MASS INDEX: 36.25 KG/M2 | OXYGEN SATURATION: 95 %

## 2020-06-05 DIAGNOSIS — R73.01 IMPAIRED FASTING GLUCOSE: ICD-10-CM

## 2020-06-05 DIAGNOSIS — Z00.00 GENERAL MEDICAL EXAM: ICD-10-CM

## 2020-06-05 DIAGNOSIS — E55.9 VITAMIN D DEFICIENCY: ICD-10-CM

## 2020-06-05 DIAGNOSIS — E78.2 MIXED HYPERLIPIDEMIA: ICD-10-CM

## 2020-06-05 DIAGNOSIS — Z12.4 SCREENING FOR CERVICAL CANCER: ICD-10-CM

## 2020-06-05 DIAGNOSIS — K21.9 GASTROESOPHAGEAL REFLUX DISEASE, ESOPHAGITIS PRESENCE NOT SPECIFIED: ICD-10-CM

## 2020-06-05 DIAGNOSIS — E66.9 OBESITY (BMI 35.0-39.9 WITHOUT COMORBIDITY): ICD-10-CM

## 2020-06-05 DIAGNOSIS — Z91.018 MULTIPLE FOOD ALLERGIES: ICD-10-CM

## 2020-06-05 PROCEDURE — 99214 OFFICE O/P EST MOD 30 MIN: CPT | Performed by: PHYSICIAN ASSISTANT

## 2020-06-05 RX ORDER — EPINEPHRINE 0.3 MG/.3ML
0.3 INJECTION SUBCUTANEOUS ONCE
Qty: 0.3 ML | Refills: 0 | Status: SHIPPED | OUTPATIENT
Start: 2020-06-05 | End: 2020-06-05

## 2020-06-05 RX ORDER — OMEPRAZOLE 20 MG/1
20 CAPSULE, DELAYED RELEASE ORAL DAILY
Qty: 90 CAP | Refills: 3 | Status: SHIPPED | OUTPATIENT
Start: 2020-06-05 | End: 2020-07-31

## 2020-06-05 ASSESSMENT — PATIENT HEALTH QUESTIONNAIRE - PHQ9: CLINICAL INTERPRETATION OF PHQ2 SCORE: 0

## 2020-06-05 ASSESSMENT — FIBROSIS 4 INDEX: FIB4 SCORE: 0.24

## 2020-06-05 NOTE — ASSESSMENT & PLAN NOTE
This is a  chronic condition.   Supplementation: yest- 5000 units daily  Last Vitamin D level:   VIT D:   Lab Results   Component Value Date/Time    25HYDROXY 31 10/07/2017 0855     Patient  denies any muscle aches or fatigue.

## 2020-06-05 NOTE — ASSESSMENT & PLAN NOTE
This is a chronic condition.   Latest Labs:   Lab Results   Component Value Date/Time    CHOLSTRLTOT 190 09/14/2019 07:52 AM     (H) 09/14/2019 07:52 AM    HDL 45 09/14/2019 07:52 AM    TRIGLYCERIDE 109 09/14/2019 07:52 AM      Medications: none  Family History of high cholesterol or heart disease? none

## 2020-06-05 NOTE — PROGRESS NOTES
CC:    Chief Complaint   Patient presents with   • Establish Care   • Gastrophageal Reflux        HISTORY OF THE PRESENT ILLNESS: Patient is a 30 y.o. female. This pleasant patient is here today establish care, discuss GERD, and needs labs.     Health Maintenance: Completed      Multiple food allergies  Has epipen on hand, Allergic tree nuts and seafood- found out in grade school. May have allergy to medications as well, found during surgery- maybe morphine?? Or Reversal of anesthesia. Allergic to most nuts. Gets hives, throat closing, tongue swelling, diarrhea, vomiting.     Impaired fasting glucose  This is a chronic condition.  Last A1c:   Lab Results   Component Value Date/Time    HBA1C 5.9 (H) 09/14/2019 0752    AVGLUC 123 09/14/2019 0752     Diet/ Exercise: Exercise is a lot better since pandemic, doing home work outs, hiking a lot. Trying to eat a well balanced diet.   FH DM? Yes, dad     Gastroesophageal reflux disease  Chronic condition, on omeprazole 20 mg delayed release. Was on 40 mg before not much benefit. Still has breakthrough heartburn with stress, trigger food- tomatoes, bananas make it worse for her. No nausea, vomiting, diarrhea, belly pain.     Hyperlipidemia  This is a chronic condition.   Latest Labs:   Lab Results   Component Value Date/Time    CHOLSTRLTOT 190 09/14/2019 07:52 AM     (H) 09/14/2019 07:52 AM    HDL 45 09/14/2019 07:52 AM    TRIGLYCERIDE 109 09/14/2019 07:52 AM      Medications: none  Family History of high cholesterol or heart disease? none      Vitamin D deficiency  This is a  chronic condition.   Supplementation: yest- 5000 units daily  Last Vitamin D level:   VIT D:   Lab Results   Component Value Date/Time    25HYDROXY 31 10/07/2017 0855     Patient  denies any muscle aches or fatigue.       Allergies: Hydromorphone; Iodine; Sugammadex; Tree nuts food allergy; and Sunflower oil    Current Outpatient Medications Ordered in Epic   Medication Sig Dispense Refill   •  EPINEPHrine (EPIPEN 2-LEONIE) 0.3 MG/0.3ML Solution Auto-injector solution for injection 0.3 mL by Intramuscular route Once for 1 dose. 0.3 mL 0   • omeprazole (PRILOSEC) 20 MG delayed-release capsule Take 1 Cap by mouth every day. 90 Cap 3   • EPINEPHrine (EPIPEN INJ) 0.3 mg by Injection route as needed (anaphylaxis).     • vitamin D (CHOLECALCIFEROL) 1000 UNIT Tab Take 5,000 Units by mouth every day.       No current [facility]-ordered facility-administered medications on file.        Past Medical History:   Diagnosis Date   • Allergy     food allergies: nuts and seafood, carries epipen   • Anxiety    • Asthma     childhood, no symptoms since age 8       Past Surgical History:   Procedure Laterality Date   • ELZA BY LAPAROSCOPY  12/13/2017    Procedure: ELZA BY LAPAROSCOPY;  Surgeon: Jordi Peña M.D.;  Location: SURGERY [facility];  Service: General       Social History     Tobacco Use   • Smoking status: Never Smoker   • Smokeless tobacco: Former User   Substance Use Topics   • Alcohol use: Not Currently     Comment: 2 times every other month   • Drug use: No       Social History     Social History Narrative   • Not on file       Family History   Problem Relation Age of Onset   • Hypertension Mother    • Anxiety disorder Mother    • Alcohol abuse Mother    • Diabetes Father    • Anxiety disorder Maternal Aunt    • Drug abuse Maternal Aunt    • Anxiety disorder Maternal Uncle        ROS:   Constitutional: Patient denies any fever, chills, night sweats, weight loss/gain, fatigue, or malaise.   ENT: Patient denies any runny nose, hoarseness, sore throat, or dysphagia.   Resp: Patient denies cough, wheezing, or shortness of breath.   CV: Patient denies any chest pain, claudication, cyanosis, palpitations, or edema.   GI: Patient denies any constipation, nausea, vomiting, diarrhea, bloating, abdominal pain, hx of GERD- see HPI.   MSK: Patient denies any joint pain, cramps, swelling, weakness, stiffness, or  "tremor  Skin: Patient denies any color changes, skin changes, lesions, ulcerations, wounds, pruritus, hair or nail changes.   Psych: Patient denies any suicidal or homicidal ideation, depression or anxiety.       Exam: /78   Pulse 81   Temp 36.6 °C (97.9 °F) (Temporal)   Resp 16   Ht 1.549 m (5' 1\")   Wt 87.1 kg (192 lb)   SpO2 95%  Body mass index is 36.28 kg/m².      GENERAL: No acute distress, appropriately dressed. Well developed female.   HENT: Atraumatic, normocephalic, EAC's clear without impaction. TM's pearly gray and translucent.   EYES: Extraocular movements intact, pupils equal and reactive to light  NECK: Supple, FROM. No thyromegaly appreciated. No lymphadenopathy (submandibular, anterior/posterior cervical, and supraclavicular lymph nodes palpated) or masses. No bruits appreciated.   CHEST: No deformities, Equal chest expansion  HEART: Regular rate and rhythm, no murmur  RESP: Clear to auscultation bilaterally without wheezes, rales or rhonchi.   ABD: Soft, Nontender, Non-Distended  Extremities: No Clubbing, Cyanosis, or Edema  Skin: Warm/dry, no rashes.   Psych: Normal behavior, normal affect      Assessment/Plan:  1. Multiple food allergies  We will renew her EpiPen at this time.  There other one was from 2017 and looks as if it may be .    2. Impaired fasting glucose  - HEMOGLOBIN A1C; Future  Chronic condition, suspect improved.  Update labs now.  Since her last set of labs she states she has improved her diet with reducing carbs and sugar intake.  She does have family history of diabetes with her dad.  3. Gastroesophageal reflux disease, esophagitis presence not specified  - REFERRAL TO GASTROENTEROLOGY  Patient has ongoing reflux.  She continues to take omeprazole 20 mg delayed release once a day.  Occasionally she still has flares with triggers such as tomatoes and bananas.  Recommend consult with GI to discuss may be an EGD, and or testing her for H. pylori.  4. Mixed " hyperlipidemia  - Lipid Profile; Future  Chronic condition.  Need updated labs now.  Patient again has improved her diet since her last set of labs and hopeful that this is improved.  5. Vitamin D deficiency  - VITAMIN D,25 HYDROXY; Future  Continue vitamin D supplementation at 5.000 IUs daily.  6. General medical exam  - CBC WITH DIFFERENTIAL; Future  - Comp Metabolic Panel; Future  - TSH; Future    7. Screening for cervical cancer  - REFERRAL TO GYNECOLOGY     8. Obesity (BMI 35.0-39.9 without comorbidity)  Patient continues to work towards reducing her weight.  She is already improved her diet over the last several months.  She also states that since the pandemic she has improved her workout regimen and is walking regularly and has equipment at home to work out at home.      - omeprazole (PRILOSEC) 20 MG delayed-release capsule; Take 1 Cap by mouth every day.  Dispense: 90 Cap; Refill: 3    Other orders  - EPINEPHrine (EPIPEN 2-LEONIE) 0.3 MG/0.3ML Solution Auto-injector solution for injection; 0.3 mL by Intramuscular route Once for 1 dose.  Dispense: 0.3 mL; Refill: 0       Follow-up: Return in about 3 months (around 9/5/2020) for Follow up.    Please note that this dictation was created using voice recognition software. I have made every reasonable attempt to correct obvious errors, but I expect that there are errors of grammar and possibly content that I did not discover before finalizing the note.

## 2020-06-05 NOTE — ASSESSMENT & PLAN NOTE
Has epipen on hand, Allergic tree nuts and seafood- found out in grade school. May have allergy to medications as well, found during surgery- maybe morphine?? Or Reversal of anesthesia. Allergic to most nuts. Gets hives, throat closing, tongue swelling, diarrhea, vomiting.

## 2020-06-05 NOTE — ASSESSMENT & PLAN NOTE
This is a chronic condition.  Last A1c:   Lab Results   Component Value Date/Time    HBA1C 5.9 (H) 09/14/2019 0752    AVGLUC 123 09/14/2019 0752     Diet/ Exercise: Exercise is a lot better since pandemic, doing home work outs, hiking a lot. Trying to eat a well balanced diet.   FH DM? Yes, dad

## 2020-06-05 NOTE — ASSESSMENT & PLAN NOTE
Chronic condition, on omeprazole 20 mg delayed release. Was on 40 mg before not much benefit. Still has breakthrough heartburn with stress, trigger food- tomatoes, bananas make it worse for her. No nausea, vomiting, diarrhea, belly pain.

## 2020-07-23 ENCOUNTER — HOSPITAL ENCOUNTER (OUTPATIENT)
Dept: LAB | Facility: MEDICAL CENTER | Age: 30
End: 2020-07-23
Attending: PHYSICIAN ASSISTANT
Payer: COMMERCIAL

## 2020-07-23 DIAGNOSIS — E78.2 MIXED HYPERLIPIDEMIA: ICD-10-CM

## 2020-07-23 DIAGNOSIS — Z00.00 GENERAL MEDICAL EXAM: ICD-10-CM

## 2020-07-23 DIAGNOSIS — R73.01 IMPAIRED FASTING GLUCOSE: ICD-10-CM

## 2020-07-23 DIAGNOSIS — E55.9 VITAMIN D DEFICIENCY: ICD-10-CM

## 2020-07-23 LAB
25(OH)D3 SERPL-MCNC: 28 NG/ML (ref 30–100)
ALBUMIN SERPL BCP-MCNC: 3.9 G/DL (ref 3.2–4.9)
ALBUMIN/GLOB SERPL: 1.2 G/DL
ALP SERPL-CCNC: 72 U/L (ref 30–99)
ALT SERPL-CCNC: 13 U/L (ref 2–50)
ANION GAP SERPL CALC-SCNC: 10 MMOL/L (ref 7–16)
AST SERPL-CCNC: 12 U/L (ref 12–45)
BASOPHILS # BLD AUTO: 0.5 % (ref 0–1.8)
BASOPHILS # BLD: 0.05 K/UL (ref 0–0.12)
BILIRUB SERPL-MCNC: 0.3 MG/DL (ref 0.1–1.5)
BUN SERPL-MCNC: 11 MG/DL (ref 8–22)
CALCIUM SERPL-MCNC: 9.1 MG/DL (ref 8.5–10.5)
CHLORIDE SERPL-SCNC: 99 MMOL/L (ref 96–112)
CHOLEST SERPL-MCNC: 144 MG/DL (ref 100–199)
CO2 SERPL-SCNC: 23 MMOL/L (ref 20–33)
CREAT SERPL-MCNC: 0.66 MG/DL (ref 0.5–1.4)
EOSINOPHIL # BLD AUTO: 0.21 K/UL (ref 0–0.51)
EOSINOPHIL NFR BLD: 1.9 % (ref 0–6.9)
ERYTHROCYTE [DISTWIDTH] IN BLOOD BY AUTOMATED COUNT: 42.5 FL (ref 35.9–50)
EST. AVERAGE GLUCOSE BLD GHB EST-MCNC: 126 MG/DL
FASTING STATUS PATIENT QL REPORTED: NORMAL
GLOBULIN SER CALC-MCNC: 3.3 G/DL (ref 1.9–3.5)
GLUCOSE SERPL-MCNC: 102 MG/DL (ref 65–99)
HBA1C MFR BLD: 6 % (ref 0–5.6)
HCT VFR BLD AUTO: 39.9 % (ref 37–47)
HDLC SERPL-MCNC: 41 MG/DL
HGB BLD-MCNC: 12.5 G/DL (ref 12–16)
IMM GRANULOCYTES # BLD AUTO: 0.03 K/UL (ref 0–0.11)
IMM GRANULOCYTES NFR BLD AUTO: 0.3 % (ref 0–0.9)
LDLC SERPL CALC-MCNC: 82 MG/DL
LYMPHOCYTES # BLD AUTO: 1.39 K/UL (ref 1–4.8)
LYMPHOCYTES NFR BLD: 12.6 % (ref 22–41)
MCH RBC QN AUTO: 25.5 PG (ref 27–33)
MCHC RBC AUTO-ENTMCNC: 31.3 G/DL (ref 33.6–35)
MCV RBC AUTO: 81.4 FL (ref 81.4–97.8)
MONOCYTES # BLD AUTO: 0.7 K/UL (ref 0–0.85)
MONOCYTES NFR BLD AUTO: 6.4 % (ref 0–13.4)
NEUTROPHILS # BLD AUTO: 8.64 K/UL (ref 2–7.15)
NEUTROPHILS NFR BLD: 78.3 % (ref 44–72)
NRBC # BLD AUTO: 0 K/UL
NRBC BLD-RTO: 0 /100 WBC
PLATELET # BLD AUTO: 351 K/UL (ref 164–446)
PMV BLD AUTO: 10.2 FL (ref 9–12.9)
POTASSIUM SERPL-SCNC: 3.7 MMOL/L (ref 3.6–5.5)
PROT SERPL-MCNC: 7.2 G/DL (ref 6–8.2)
RBC # BLD AUTO: 4.9 M/UL (ref 4.2–5.4)
SODIUM SERPL-SCNC: 132 MMOL/L (ref 135–145)
TRIGL SERPL-MCNC: 104 MG/DL (ref 0–149)
TSH SERPL DL<=0.005 MIU/L-ACNC: 2.51 UIU/ML (ref 0.38–5.33)
WBC # BLD AUTO: 11 K/UL (ref 4.8–10.8)

## 2020-07-23 PROCEDURE — 80053 COMPREHEN METABOLIC PANEL: CPT

## 2020-07-23 PROCEDURE — 85025 COMPLETE CBC W/AUTO DIFF WBC: CPT

## 2020-07-23 PROCEDURE — 80061 LIPID PANEL: CPT

## 2020-07-23 PROCEDURE — 83036 HEMOGLOBIN GLYCOSYLATED A1C: CPT

## 2020-07-23 PROCEDURE — 82306 VITAMIN D 25 HYDROXY: CPT

## 2020-07-23 PROCEDURE — 36415 COLL VENOUS BLD VENIPUNCTURE: CPT

## 2020-07-23 PROCEDURE — 84443 ASSAY THYROID STIM HORMONE: CPT

## 2020-07-27 ENCOUNTER — TELEPHONE (OUTPATIENT)
Dept: MEDICAL GROUP | Facility: PHYSICIAN GROUP | Age: 30
End: 2020-07-27

## 2020-07-27 NOTE — TELEPHONE ENCOUNTER
1st Attempt:    Left voicemail message for patient to call the office back at [phone]    Sent pt my chart message

## 2020-07-27 NOTE — TELEPHONE ENCOUNTER
----- Message from Hali Tan P.A.-C. sent at 7/27/2020  2:00 PM PDT -----  Patient has follow up scheduled with me July 31, 2020, we will review their labs at that time, no further action needed right now.     Thank you,    Hali Tan PA-C

## 2020-07-29 PROBLEM — E87.1 HYPONATREMIA: Status: ACTIVE | Noted: 2020-07-29

## 2020-07-29 PROBLEM — R79.89 ABNORMAL CBC: Status: ACTIVE | Noted: 2020-07-29

## 2020-07-29 NOTE — ASSESSMENT & PLAN NOTE
This is a chronic condition.  Last A1c:   Lab Results   Component Value Date/Time    HBA1C 6.0 (H) 07/23/2020 0747    AVGLUC 126 07/23/2020 0747     Hemoglobin A1c slightly worse from previous labs last year.  Hemoglobin A1c was at 5.9 now at 6.0.  Dad has father had diabetes.

## 2020-07-29 NOTE — ASSESSMENT & PLAN NOTE
This is a  chronic condition.   Supplementation: taking OTC supplement, started beginning of year.   Last Vitamin D level:   VIT D:   Lab Results   Component Value Date/Time    25HYDROXY 28 (L) 07/23/2020 0747     Patient denies any muscle aches or fatigue.

## 2020-07-29 NOTE — ASSESSMENT & PLAN NOTE
Lab Results   Component Value Date/Time    WBC 11.0 (H) 07/23/2020 07:47 AM    RBC 4.90 07/23/2020 07:47 AM    HEMOGLOBIN 12.5 07/23/2020 07:47 AM    HEMATOCRIT 39.9 07/23/2020 07:47 AM    MCV 81.4 07/23/2020 07:47 AM    MCH 25.5 (L) 07/23/2020 07:47 AM    MCHC 31.3 (L) 07/23/2020 07:47 AM    RDW 42.5 07/23/2020 07:47 AM    PLATELETCT 351 07/23/2020 07:47 AM    MPV 10.2 07/23/2020 07:47 AM    NEUTSPOLYS 78.30 (H) 07/23/2020 07:47 AM    LYMPHOCYTES 12.60 (L) 07/23/2020 07:47 AM    MONOCYTES 6.40 07/23/2020 07:47 AM    EOSINOPHILS 1.90 07/23/2020 07:47 AM    BASOPHILS 0.50 07/23/2020 07:47 AM    IMMGRAN 0.30 07/23/2020 07:47 AM    NRBC 0.00 07/23/2020 07:47 AM    NEUTS 8.64 (H) 07/23/2020 07:47 AM    LYMPHS 1.39 07/23/2020 07:47 AM    MONOS 0.70 07/23/2020 07:47 AM    EOS 0.21 07/23/2020 07:47 AM    BASO 0.05 07/23/2020 07:47 AM    IMMGRANAB 0.03 07/23/2020 07:47 AM    NRBCAB 0.00 07/23/2020 07:47 AM   ]    No recent illness. But very stressed, recently lost her mother, see grieving diagnosis. No fever or chills, no urinary symptoms, no belly pain, no cough.

## 2020-07-29 NOTE — ASSESSMENT & PLAN NOTE
This is a chronic condition.   Latest Labs:   Lab Results   Component Value Date/Time    CHOLSTRLTOT 144 07/23/2020 07:47 AM    LDL 82 07/23/2020 07:47 AM    HDL 41 07/23/2020 07:47 AM    TRIGLYCERIDE 104 07/23/2020 07:47 AM

## 2020-07-31 ENCOUNTER — OFFICE VISIT (OUTPATIENT)
Dept: MEDICAL GROUP | Facility: PHYSICIAN GROUP | Age: 30
End: 2020-07-31
Payer: COMMERCIAL

## 2020-07-31 VITALS
HEIGHT: 61 IN | BODY MASS INDEX: 35.5 KG/M2 | SYSTOLIC BLOOD PRESSURE: 120 MMHG | RESPIRATION RATE: 12 BRPM | WEIGHT: 188 LBS | DIASTOLIC BLOOD PRESSURE: 80 MMHG | HEART RATE: 103 BPM | OXYGEN SATURATION: 96 % | TEMPERATURE: 98.1 F

## 2020-07-31 DIAGNOSIS — F43.21 GRIEVING: ICD-10-CM

## 2020-07-31 DIAGNOSIS — E55.9 VITAMIN D DEFICIENCY: ICD-10-CM

## 2020-07-31 DIAGNOSIS — E87.1 HYPONATREMIA: ICD-10-CM

## 2020-07-31 DIAGNOSIS — R73.01 IMPAIRED FASTING GLUCOSE: ICD-10-CM

## 2020-07-31 DIAGNOSIS — E78.2 MIXED HYPERLIPIDEMIA: ICD-10-CM

## 2020-07-31 DIAGNOSIS — R79.89 ABNORMAL CBC: ICD-10-CM

## 2020-07-31 DIAGNOSIS — K21.9 GASTROESOPHAGEAL REFLUX DISEASE, ESOPHAGITIS PRESENCE NOT SPECIFIED: ICD-10-CM

## 2020-07-31 PROCEDURE — 99214 OFFICE O/P EST MOD 30 MIN: CPT | Performed by: PHYSICIAN ASSISTANT

## 2020-07-31 RX ORDER — OMEPRAZOLE 20 MG/1
20 CAPSULE, DELAYED RELEASE ORAL DAILY
COMMUNITY
End: 2020-09-08 | Stop reason: SDUPTHER

## 2020-07-31 RX ORDER — HYDROXYZINE HYDROCHLORIDE 10 MG/1
10 TABLET, FILM COATED ORAL DAILY
Qty: 30 TAB | Refills: 0 | Status: SHIPPED | OUTPATIENT
Start: 2020-07-31 | End: 2021-09-10

## 2020-07-31 ASSESSMENT — FIBROSIS 4 INDEX: FIB4 SCORE: 0.3

## 2020-07-31 NOTE — ASSESSMENT & PLAN NOTE
Saw GI, getting scheduled for EGD. Trial pantoprazole, but did not help. Actually made her feel worse. Went back to omeprazole 20 mg daily.   Went to [facility].

## 2020-07-31 NOTE — PROGRESS NOTES
CC:   Chief Complaint   Patient presents with   • Lab Results   • Hyperlipidemia          HISTORY OF PRESENT ILLNESS: Patient is a 30 y.o. female established patient who presents today to       Vitamin D deficiency  This is a  chronic condition.   Supplementation: taking OTC supplement, started beginning of year.   Last Vitamin D level:   VIT D:   Lab Results   Component Value Date/Time    25HYDROXY 28 (L) 07/23/2020 0747     Patient denies any muscle aches or fatigue.       Hyperlipidemia  This is a chronic condition.   Latest Labs:   Lab Results   Component Value Date/Time    CHOLSTRLTOT 144 07/23/2020 07:47 AM    LDL 82 07/23/2020 07:47 AM    HDL 41 07/23/2020 07:47 AM    TRIGLYCERIDE 104 07/23/2020 07:47 AM        Impaired fasting glucose  This is a chronic condition.  Last A1c:   Lab Results   Component Value Date/Time    HBA1C 6.0 (H) 07/23/2020 0747    AVGLUC 126 07/23/2020 0747     Hemoglobin A1c slightly worse from previous labs last year.  Hemoglobin A1c was at 5.9 now at 6.0.  Dad has father had diabetes.    Abnormal CBC  Lab Results   Component Value Date/Time    WBC 11.0 (H) 07/23/2020 07:47 AM    RBC 4.90 07/23/2020 07:47 AM    HEMOGLOBIN 12.5 07/23/2020 07:47 AM    HEMATOCRIT 39.9 07/23/2020 07:47 AM    MCV 81.4 07/23/2020 07:47 AM    MCH 25.5 (L) 07/23/2020 07:47 AM    MCHC 31.3 (L) 07/23/2020 07:47 AM    RDW 42.5 07/23/2020 07:47 AM    PLATELETCT 351 07/23/2020 07:47 AM    MPV 10.2 07/23/2020 07:47 AM    NEUTSPOLYS 78.30 (H) 07/23/2020 07:47 AM    LYMPHOCYTES 12.60 (L) 07/23/2020 07:47 AM    MONOCYTES 6.40 07/23/2020 07:47 AM    EOSINOPHILS 1.90 07/23/2020 07:47 AM    BASOPHILS 0.50 07/23/2020 07:47 AM    IMMGRAN 0.30 07/23/2020 07:47 AM    NRBC 0.00 07/23/2020 07:47 AM    NEUTS 8.64 (H) 07/23/2020 07:47 AM    LYMPHS 1.39 07/23/2020 07:47 AM    MONOS 0.70 07/23/2020 07:47 AM    EOS 0.21 07/23/2020 07:47 AM    BASO 0.05 07/23/2020 07:47 AM    IMMGRANAB 0.03 07/23/2020 07:47 AM    NRBCAB 0.00  07/23/2020 07:47 AM   ]    No recent illness. But very stressed, recently lost her mother, see clau diagnosis. No fever or chills, no urinary symptoms, no belly pain, no cough.       Hyponatremia  Sodium nonfasting labs 132.    Clau  Recently lost her mother, a few weeks. Lost her to sepsis, and hypoglycemia. Clau, going through process of taking care of her mother affairs.     She has a lot of stress right now she is trying to take care of her family in addition to her mother's affairs.  She is the oldest of her siblings and has to be a  for her father who speaks only Syriac.  She is having some difficulty sleeping, used to use Xanax just as needed to help her sleep.  Discussed treatment options today.    Gastroesophageal reflux disease  Saw GI, getting scheduled for EGD. Trial pantoprazole, but did not help. Actually made her feel worse. Went back to omeprazole 20 mg daily.   Went to [facility].       Patient Active Problem List    Diagnosis Date Noted   • Clau 07/31/2020   • Abnormal CBC 07/29/2020   • Hyponatremia 07/29/2020   • Impaired fasting glucose 05/20/2019   • Bilateral pes planus 05/20/2019   • Gastroesophageal reflux disease 03/18/2019   • Hyperlipidemia 05/31/2017   • Situational phobia 10/21/2016   • Vitamin D deficiency 10/14/2016   • Multiple food allergies 08/12/2016   • Obesity (BMI 35.0-39.9 without comorbidity) 08/12/2016      Allergies:Hydromorphone; Iodine; Sugammadex; Tree nuts food allergy; and Sunflower oil    Current Outpatient Medications   Medication Sig Dispense Refill   • omeprazole (PRILOSEC) 20 MG delayed-release capsule Take 20 mg by mouth every day.     • hydrOXYzine HCl (ATARAX) 10 MG Tab Take 1 Tab by mouth every day. 30 Tab 0   • EPINEPHrine (EPIPEN INJ) 0.3 mg by Injection route as needed (anaphylaxis).     • vitamin D (CHOLECALCIFEROL) 1000 UNIT Tab Take 5,000 Units by mouth every day.       No current facility-administered medications for this visit.   "      Social History     Tobacco Use   • Smoking status: Never Smoker   • Smokeless tobacco: Former User   Substance Use Topics   • Alcohol use: Not Currently     Comment: 2 times every other month   • Drug use: No     Social History     Social History Narrative   • Not on file       Family History   Problem Relation Age of Onset   • Hypertension Mother    • Anxiety disorder Mother    • Alcohol abuse Mother    • Diabetes Father    • Anxiety disorder Maternal Aunt    • Drug abuse Maternal Aunt    • Anxiety disorder Maternal Uncle        Review of Systems:    Constitutional: No Fevers, Chills  Eyes: No vision changes  ENT: No hearing changes  Resp: No Shortness of breath  CV: No Chest pain  GI: No Nausea/Vomiting  MSK: No weakness  Skin: No rashes  Neuro: No Headaches  Psych: No Suicidal ideations, Grieving process.     All remaining systems reviewed and found to be negative, except as stated above.    Exam:    /80   Pulse (!) 103   Temp 36.7 °C (98.1 °F) (Temporal)   Resp 12   Ht 1.549 m (5' 1\")   Wt 85.3 kg (188 lb)   SpO2 96%  Body mass index is 35.52 kg/m².    General:  Well nourished, well developed female in NAD  HENT: Atraumatic, normocephalic  EYES: Extraocular movements intact, pupils equal and reactive to light  NECK: Supple, FROM  CHEST: No deformities, Equal chest expansion  RESP: Unlabored, no stridor or audible wheeze  HEART: Regular Rate and rhythm.   ABD: Soft, Nontender, Non-Distended  Extremities: No Clubbing, Cyanosis, or Edema  Skin: Warm/dry, without rashes  Neuro: A/O x 4, due to COVID-19- did not have patient remove face mask to test cranial nerves.  Motor/sensory grossly intact  Psych: Normal behavior, normal affect      Lab review:  labs reviewed, and discussed in detail with patient, see HPI.       Assessment/Plan:  1. Vitamin D deficiency  - VITAMIN D,25 HYDROXY; Future  Supplement vitamin D 2000 IUs a day.  Chronic condition.  Repeat labs in 3 months.  2. Mixed " hyperlipidemia  Improved cholesterol labs.  Continue lifestyle modifications.  Recheck in 6 months.  3. Impaired fasting glucose  - HEMOGLOBIN A1C; Future  Chronic condition.  Hemoglobin A1c slightly worsened to 6.0.  Patient has recently endured a very stressful event, she will continue try to work on lifestyle modifications.  Discussed reducing carbs and sugars today.  4. Abnormal CBC  - CBC WITH DIFFERENTIAL; Future  White blood cells slightly elevated, neutrophils high and lymphocytes low.  Again was under an immense amount of stress in the last few weeks.  She denies any recent fever, chills, abdominal pain, urinary symptoms, cough, headache, no illness that she was aware of.  Will repeat labs to ensure these normalized.  5. Hyponatremia  - Basic Metabolic Panel; Future  Increase salt intake slightly.  Repeat labs.  6. Grieving  - REFERRAL TO PSYCHOLOGY  Patient would like to talk to a counselor to help her with the grieving process of losing her mother a few weeks ago.  Unexpected, difficult to grieve right now because she is trying to take care of family affairs.  Having some difficult sleeping.  We will try Atarax 10 mg as needed.  7. Gastroesophageal reflux disease, esophagitis presence not specified  Being followed by GI C.  Will be scheduled for EGD.  Continue omeprazole 20 mg a day.    Other orders  - omeprazole (PRILOSEC) 20 MG delayed-release capsule; Take 20 mg by mouth every day.  - hydrOXYzine HCl (ATARAX) 10 MG Tab; Take 1 Tab by mouth every day.  Dispense: 30 Tab; Refill: 0       Follow-up: Return in about 3 months (around 10/31/2020) for Follow up on labs.    Please note that this dictation was created using voice recognition software. I have made every reasonable attempt to correct obvious errors, but I expect that there are errors of grammar and possibly content that I did not discover before finalizing the note.

## 2020-07-31 NOTE — ASSESSMENT & PLAN NOTE
Recently lost her mother, a few weeks. Lost her to sepsis, and hypoglycemia. Grieving, going through process of taking care of her mother affairs.     She has a lot of stress right now she is trying to take care of her family in addition to her mother's affairs.  She is the oldest of her siblings and has to be a  for her father who speaks only Guatemalan.  She is having some difficulty sleeping, used to use Xanax just as needed to help her sleep.  Discussed treatment options today.

## 2020-08-13 ENCOUNTER — HOSPITAL ENCOUNTER (OUTPATIENT)
Facility: MEDICAL CENTER | Age: 30
End: 2020-08-13
Attending: OBSTETRICS & GYNECOLOGY
Payer: COMMERCIAL

## 2020-08-13 ENCOUNTER — GYNECOLOGY VISIT (OUTPATIENT)
Dept: OBGYN | Facility: CLINIC | Age: 30
End: 2020-08-13
Payer: COMMERCIAL

## 2020-08-13 VITALS
SYSTOLIC BLOOD PRESSURE: 114 MMHG | WEIGHT: 188 LBS | DIASTOLIC BLOOD PRESSURE: 93 MMHG | BODY MASS INDEX: 36.91 KG/M2 | HEIGHT: 60 IN

## 2020-08-13 DIAGNOSIS — Z01.419 WELL WOMAN EXAM WITH ROUTINE GYNECOLOGICAL EXAM: ICD-10-CM

## 2020-08-13 PROCEDURE — 87624 HPV HI-RISK TYP POOLED RSLT: CPT

## 2020-08-13 PROCEDURE — 99385 PREV VISIT NEW AGE 18-39: CPT | Performed by: OBSTETRICS & GYNECOLOGY

## 2020-08-13 PROCEDURE — 88175 CYTOPATH C/V AUTO FLUID REDO: CPT

## 2020-08-13 PROCEDURE — 87491 CHLMYD TRACH DNA AMP PROBE: CPT

## 2020-08-13 PROCEDURE — 87591 N.GONORRHOEAE DNA AMP PROB: CPT

## 2020-08-13 RX ORDER — NORETHINDRONE ACETATE AND ETHINYL ESTRADIOL AND FERROUS FUMARATE 1MG-20(24)
1 KIT ORAL DAILY
Qty: 84 TAB | Refills: 3 | Status: SHIPPED | OUTPATIENT
Start: 2020-08-13 | End: 2021-05-19 | Stop reason: SDUPTHER

## 2020-08-13 RX ORDER — ALPRAZOLAM 0.5 MG/1
0.5 TABLET ORAL NIGHTLY PRN
COMMUNITY
End: 2021-09-10 | Stop reason: SDUPTHER

## 2020-08-13 ASSESSMENT — FIBROSIS 4 INDEX: FIB4 SCORE: 0.3

## 2020-08-13 NOTE — PROGRESS NOTES
Annual exam/pap  Pt would like to talk about painful periods  LMP: 08/04/2020  Last pap: 07/07/2020 dine in [facility] (Negative)  Using condoms  WT:188 lb  BP: 114/93  Good # [phone]

## 2020-08-13 NOTE — PROGRESS NOTES
Elinor Breaux is a 30 y.o.  female who presents for her Annual Gynecologic Exam      HPI Comments: Pt presents for her annual well woman exam.   Pt has complaints of increasing heaviness and cramping with her periods for the past 1.5 years.  Patient states the majority of the cramping is on day 2 associated with clots and diarrhea.  She states that since last 1.5 years only medical change is that she has been diagnosed with prediabetes.  Most recent A1c is 6.0    Patient's last menstrual period was 2020 (exact date).    Review of Systems:   Pertinent positives documented in HPI and all other systems reviewed & are negative    All PMH, PSH, allergies, social history and FH reviewed and updated today:    PGYN Hx: LMP: 2020, Cycles monthly lasting 5 days, Menarche: 9 years old  Sexually active with male partner, Birth control currently condoms, in the past has used Ortho Tri-Cyclen however it was over 10 years ago.  Hx STDs: Denies  Last pap smear:  normal, denies history of cervical biopsies or excisional procedures.    OB History    Para Term  AB Living   0 0 0 0 0 0   SAB TAB Ectopic Molar Multiple Live Births   0 0 0   0          Past Medical History:   Diagnosis Date   • Allergy     food allergies: nuts and seafood, carries epipen   • Anxiety    • Asthma     childhood, no symptoms since age 8   • Hypertension        Past Surgical History:   Procedure Laterality Date   • ELZA BY LAPAROSCOPY  2017    Procedure: ELZA BY LAPAROSCOPY;  Surgeon: Jordi Peña M.D.;  Location: SURGERY [facility];  Service: General       Medications:   Current Outpatient Medications Ordered in Epic   Medication Sig Dispense Refill   • ALPRAZolam (XANAX) 0.5 MG Tab Take 0.5 mg by mouth at bedtime as needed for Sleep.     • Norethin Ace-Eth Estrad-FE 1-20 MG-MCG(24) Tab Take 1 Tab by mouth every day. 84 Tab 3   • omeprazole (PRILOSEC) 20 MG delayed-release capsule Take 20 mg by mouth  every day.     • EPINEPHrine (EPIPEN INJ) 0.3 mg by Injection route as needed (anaphylaxis).     • vitamin D (CHOLECALCIFEROL) 1000 UNIT Tab Take 5,000 Units by mouth every day.     • hydrOXYzine HCl (ATARAX) 10 MG Tab Take 1 Tab by mouth every day. (Patient not taking: Reported on 8/13/2020) 30 Tab 0     No current Epic-ordered facility-administered medications on file.        Hydromorphone, Iodine, Sugammadex, Tree nuts food allergy, and Sunflower oil    Social History     Socioeconomic History   • Marital status: Single     Spouse name: Not on file   • Number of children: Not on file   • Years of education: Not on file   • Highest education level: Not on file   Occupational History   • Not on file   Social Needs   • Financial resource strain: Not on file   • Food insecurity     Worry: Not on file     Inability: Not on file   • Transportation needs     Medical: Not on file     Non-medical: Not on file   Tobacco Use   • Smoking status: Never Smoker   • Smokeless tobacco: Never Used   Substance and Sexual Activity   • Alcohol use: Not Currently     Comment: 2 times every other month   • Drug use: No   • Sexual activity: Yes     Partners: Male     Birth control/protection: Condom   Lifestyle   • Physical activity     Days per week: Not on file     Minutes per session: Not on file   • Stress: Not on file   Relationships   • Social connections     Talks on phone: Not on file     Gets together: Not on file     Attends [employer] service: Not on file     Active member of club or organization: Not on file     Attends meetings of clubs or organizations: Not on file     Relationship status: Not on file   • Intimate partner violence     Fear of current or ex partner: Not on file     Emotionally abused: Not on file     Physically abused: Not on file     Forced sexual activity: Not on file   Other Topics Concern   • Not on file   Social History Narrative   • Not on file       Family History   Problem Relation Age of Onset   •  Hypertension Mother    • Anxiety disorder Mother    • Alcohol abuse Mother    • Diabetes Father    • Anxiety disorder Maternal Aunt    • Drug abuse Maternal Aunt    • Anxiety disorder Maternal Uncle         Objective:   Vital measurements:  /93 (BP Location: Left arm, Patient Position: Sitting)   Ht 1.524 m (5')   Wt 85.3 kg (188 lb)   Body mass index is 36.72 kg/m². (Goal BM I>18 <25)    Physical Exam   Nursing note and vitals reviewed.  Constitutional: She is oriented to person, place, and time. She appears well-developed and well-nourished. No distress.     HEENT:   Head: Normocephalic and atraumatic.   Right Ear: External ear normal.   Left Ear: External ear normal.   Nose: Nose normal.   Eyes: Conjunctivae and EOM are normal. Pupils are equal, round, and reactive to light. No scleral icterus.     Neck: Normal range of motion. Neck supple. No tracheal deviation present. No thyromegaly present. No cervical or supraclavicular lymphadenopathy.    Pulmonary/Chest: Effort normal and breath sounds normal. No respiratory distress. She has no wheezes. She has no rales. She exhibits no tenderness.     Cardiovascular: Regular, rate and rhythm. No edema.    Breast: Symmetrical, normal consistency without masses., No dimpling or skin changes    Abdominal: Soft. Bowel sounds are normal. She exhibits no distension and no mass. No tenderness. She has no rebound and no guarding.     Genitourinary:  Pelvic exam was performed with patient supine.  External genitalia with no abnormal pigmentation, labial fusion, rashes, tenderness, lesions or injury to the labia bilaterally.  BUS normal  Vagina is pink and moist with no lesions, foul discharge, erythema, tenderness or bleeding. No foreign body around the vagina or signs of injury.   Cervix exhibits no motion tenderness, no discharge and no friability, no lesions.   Uterus is small and mobile not deviated, not enlarged, not fixed and not tender.  Right adnexa displays no  mass, no tenderness and no fullness.  Left adnexa displays no mass, no tenderness and no fullness.     Musculoskeletal: Normal range of motion. Non tender. She exhibits no edema and no tenderness.     Lymphadenopathy: She has no cervical or supraclavicular adenopathy.     Neurological: She is alert and oriented to person, place, and time. She exhibits normal muscle tone.     Skin: Skin is warm and dry. No rash noted. She is not diaphoretic. No erythema. No pallor.     Psychiatric: She has a normal mood and affect. Her behavior is normal. Judgment and thought content normal.     Pelvic and breast exams chaperoned by MA.     Assessment:     1. Well woman exam with routine gynecological exam  THINPREP PAP W/HPV AND CTNG       Plan:     #Well Woman  - Pap and physical exam performed; discussed pap smear screening guidelines  - Self breast awareness discussed.  - Encouraged exercise and proper diet.  - Mammograms starting @ age 40 annually.  - See medications and orders placed in encounter report.    #Heavy menstrual periods    Discussed with patient today various forms of birth control available. We reviewed birth control pills, Depo-provera, NuvaRing, IUDs (both hormonal and non-hormonal), and Nexplanon.  Risks, benefits, and side effects to each method discussed in detail.  Also discussed with patient the barrier methods (condoms) available for contraception and prevention of STDs.  After discussion of all the available methods, patient elects for combination oral contraceptives.  She denies any personal history of VTE.  There is no family history of breast cancer or ovarian cancer.  We discussed risks and benefits of hormonal contraceptives in detail and patient agrees.    - Follow up in 1 year for annual exam or sooner as needed

## 2020-08-14 DIAGNOSIS — Z01.419 WELL WOMAN EXAM WITH ROUTINE GYNECOLOGICAL EXAM: ICD-10-CM

## 2020-08-16 LAB
C TRACH DNA GENITAL QL NAA+PROBE: NEGATIVE
CYTOLOGY REG CYTOL: NORMAL
HPV HR 12 DNA CVX QL NAA+PROBE: NEGATIVE
HPV16 DNA SPEC QL NAA+PROBE: NEGATIVE
HPV18 DNA SPEC QL NAA+PROBE: NEGATIVE
N GONORRHOEA DNA GENITAL QL NAA+PROBE: NEGATIVE
SPECIMEN SOURCE: NORMAL
SPECIMEN SOURCE: NORMAL

## 2020-09-08 RX ORDER — OMEPRAZOLE 20 MG/1
20 CAPSULE, DELAYED RELEASE ORAL DAILY
Qty: 90 CAP | Refills: 0 | Status: SHIPPED | OUTPATIENT
Start: 2020-09-08 | End: 2020-12-07

## 2020-09-08 NOTE — TELEPHONE ENCOUNTER
----- Message from Elinor Breaux sent at 9/7/2020  7:19 PM PDT -----  Regarding: Prescription Question  Contact: [phone]  Hello,    I am trying to request my heartburn medication refill omeprazole 20 MG delayed-release capsule and it is not allowing me through [employer]. May I request a refill at this time?    Thank you,  Ashli

## 2020-10-16 ENCOUNTER — PATIENT MESSAGE (OUTPATIENT)
Dept: MEDICAL GROUP | Facility: PHYSICIAN GROUP | Age: 30
End: 2020-10-16

## 2020-10-16 DIAGNOSIS — Z20.822 CLOSE EXPOSURE TO COVID-19 VIRUS: ICD-10-CM

## 2020-10-16 NOTE — PROGRESS NOTES
I have sent in an order for the COVID testing.  Test must be performed by appointment, no walk-ins.  It must be performed at 1 of the following [facility] outpatient lab locations only: [facility], [facility], [facility], [facility], or [facility].  You will be screened at the door and the test obtained while you wait in your car.  This test will be sent to [facility] and the result will come back in 48 to 96 hours.  I do recommend that you self quarantine until the results are reported to the health department.    Thank you,    Hali Tan PA-C

## 2020-10-24 ENCOUNTER — HOSPITAL ENCOUNTER (OUTPATIENT)
Dept: LAB | Facility: MEDICAL CENTER | Age: 30
End: 2020-10-24
Attending: PHYSICIAN ASSISTANT
Payer: COMMERCIAL

## 2020-10-24 DIAGNOSIS — R73.01 IMPAIRED FASTING GLUCOSE: ICD-10-CM

## 2020-10-24 DIAGNOSIS — E87.1 HYPONATREMIA: ICD-10-CM

## 2020-10-24 DIAGNOSIS — E55.9 VITAMIN D DEFICIENCY: ICD-10-CM

## 2020-10-24 DIAGNOSIS — R79.89 ABNORMAL CBC: ICD-10-CM

## 2020-10-24 LAB
25(OH)D3 SERPL-MCNC: 34 NG/ML (ref 30–100)
ANION GAP SERPL CALC-SCNC: 11 MMOL/L (ref 7–16)
BASOPHILS # BLD AUTO: 0.7 % (ref 0–1.8)
BASOPHILS # BLD: 0.06 K/UL (ref 0–0.12)
BUN SERPL-MCNC: 12 MG/DL (ref 8–22)
BURR CELLS BLD QL SMEAR: NORMAL
CALCIUM SERPL-MCNC: 9 MG/DL (ref 8.5–10.5)
CHLORIDE SERPL-SCNC: 103 MMOL/L (ref 96–112)
CO2 SERPL-SCNC: 21 MMOL/L (ref 20–33)
COMMENT 1642: NORMAL
CREAT SERPL-MCNC: 0.6 MG/DL (ref 0.5–1.4)
EOSINOPHIL # BLD AUTO: 0.36 K/UL (ref 0–0.51)
EOSINOPHIL NFR BLD: 4.1 % (ref 0–6.9)
ERYTHROCYTE [DISTWIDTH] IN BLOOD BY AUTOMATED COUNT: 45.2 FL (ref 35.9–50)
EST. AVERAGE GLUCOSE BLD GHB EST-MCNC: 148 MG/DL
FASTING STATUS PATIENT QL REPORTED: NORMAL
GLUCOSE SERPL-MCNC: 103 MG/DL (ref 65–99)
HBA1C MFR BLD: 6.8 % (ref 0–5.6)
HCT VFR BLD AUTO: 42.2 % (ref 37–47)
HGB BLD-MCNC: 12.6 G/DL (ref 12–16)
IMM GRANULOCYTES # BLD AUTO: 0.03 K/UL (ref 0–0.11)
IMM GRANULOCYTES NFR BLD AUTO: 0.3 % (ref 0–0.9)
LYMPHOCYTES # BLD AUTO: 2.29 K/UL (ref 1–4.8)
LYMPHOCYTES NFR BLD: 25.8 % (ref 22–41)
MCH RBC QN AUTO: 24.3 PG (ref 27–33)
MCHC RBC AUTO-ENTMCNC: 29.9 G/DL (ref 33.6–35)
MCV RBC AUTO: 81.3 FL (ref 81.4–97.8)
MONOCYTES # BLD AUTO: 0.47 K/UL (ref 0–0.85)
MONOCYTES NFR BLD AUTO: 5.3 % (ref 0–13.4)
MORPHOLOGY BLD-IMP: NORMAL
NEUTROPHILS # BLD AUTO: 5.66 K/UL (ref 2–7.15)
NEUTROPHILS NFR BLD: 63.8 % (ref 44–72)
NRBC # BLD AUTO: 0 K/UL
NRBC BLD-RTO: 0 /100 WBC
PLATELET # BLD AUTO: 443 K/UL (ref 164–446)
PLATELET BLD QL SMEAR: NORMAL
PMV BLD AUTO: 10.2 FL (ref 9–12.9)
POIKILOCYTOSIS BLD QL SMEAR: NORMAL
POTASSIUM SERPL-SCNC: 4.5 MMOL/L (ref 3.6–5.5)
RBC # BLD AUTO: 5.19 M/UL (ref 4.2–5.4)
RBC BLD AUTO: PRESENT
SODIUM SERPL-SCNC: 135 MMOL/L (ref 135–145)
WBC # BLD AUTO: 8.9 K/UL (ref 4.8–10.8)

## 2020-10-24 PROCEDURE — 82306 VITAMIN D 25 HYDROXY: CPT

## 2020-10-24 PROCEDURE — 85025 COMPLETE CBC W/AUTO DIFF WBC: CPT

## 2020-10-24 PROCEDURE — 36415 COLL VENOUS BLD VENIPUNCTURE: CPT

## 2020-10-24 PROCEDURE — 80048 BASIC METABOLIC PNL TOTAL CA: CPT

## 2020-10-24 PROCEDURE — 83036 HEMOGLOBIN GLYCOSYLATED A1C: CPT

## 2020-10-28 PROBLEM — E11.9 TYPE 2 DIABETES MELLITUS WITHOUT COMPLICATION, WITHOUT LONG-TERM CURRENT USE OF INSULIN (HCC): Status: ACTIVE | Noted: 2019-05-20

## 2020-10-28 NOTE — ASSESSMENT & PLAN NOTE
NEW diagnosis  Current medications: none currently  Last A1c:   Lab Results   Component Value Date/Time    HBA1C 6.8 (H) 10/24/2020 0739    AVGLUC 148 10/24/2020 0739     Last Microalb/Cr ratio: No results found for: URCREAT, MICROALBUR, MALBCRT    Last retinal eye exam: Discussed patient needs to establish for eye screen. Will refer today.   ACEi/ARB? none  Statin? none  Aspirin? none  Concomitant HTN? no  Nightly foot checks? Discussed need for nightly foot checks today.

## 2020-10-28 NOTE — ASSESSMENT & PLAN NOTE
This is a chronic condition.   Latest Labs:   Lab Results   Component Value Date/Time    CHOLSTRLTOT 144 07/23/2020 07:47 AM    LDL 82 07/23/2020 07:47 AM    HDL 41 07/23/2020 07:47 AM    TRIGLYCERIDE 104 07/23/2020 07:47 AM      Medications: none

## 2020-10-28 NOTE — ASSESSMENT & PLAN NOTE
Patient CBC shows microcytosis.  No evidence of anemia.  Hemoglobin hematocrit and red blood cells are within normal limits.  Morphology showsPoikilocytosis 1+ and Echinocytes 1+.     MCV was within normal limits on last lab draw.  White blood cell count was elevated but now normalized.  Recommend we repeat with an iron panel.  Dark tarry stools? None  Heavy menses? Yes- just started oral BCP- helping cycle greatly, reduced bleeding and cramping.

## 2020-11-02 ENCOUNTER — OFFICE VISIT (OUTPATIENT)
Dept: MEDICAL GROUP | Facility: PHYSICIAN GROUP | Age: 30
End: 2020-11-02
Payer: COMMERCIAL

## 2020-11-02 VITALS
BODY MASS INDEX: 38.68 KG/M2 | RESPIRATION RATE: 12 BRPM | OXYGEN SATURATION: 95 % | WEIGHT: 197 LBS | HEIGHT: 60 IN | HEART RATE: 100 BPM | SYSTOLIC BLOOD PRESSURE: 110 MMHG | DIASTOLIC BLOOD PRESSURE: 62 MMHG | TEMPERATURE: 97 F

## 2020-11-02 DIAGNOSIS — E78.2 MIXED HYPERLIPIDEMIA: ICD-10-CM

## 2020-11-02 DIAGNOSIS — R06.83 SNORING: ICD-10-CM

## 2020-11-02 DIAGNOSIS — R71.8 MICROCYTOSIS: ICD-10-CM

## 2020-11-02 DIAGNOSIS — R79.89 ABNORMAL CBC: ICD-10-CM

## 2020-11-02 DIAGNOSIS — E11.9 TYPE 2 DIABETES MELLITUS WITHOUT COMPLICATION, WITHOUT LONG-TERM CURRENT USE OF INSULIN (HCC): ICD-10-CM

## 2020-11-02 PROCEDURE — 99214 OFFICE O/P EST MOD 30 MIN: CPT | Performed by: PHYSICIAN ASSISTANT

## 2020-11-02 ASSESSMENT — FIBROSIS 4 INDEX: FIB4 SCORE: 0.24

## 2020-11-02 NOTE — PROGRESS NOTES
CC:   Chief Complaint   Patient presents with   • Lab Results          HISTORY OF PRESENT ILLNESS: Patient is a 30 y.o. female established patient who presents today to follow up on labs.     Health Maintenance: Completed    Abnormal CBC  Patient CBC shows microcytosis.  No evidence of anemia.  Hemoglobin hematocrit and red blood cells are within normal limits.  Morphology showsPoikilocytosis 1+ and Echinocytes 1+.     MCV was within normal limits on last lab draw.  White blood cell count was elevated but now normalized.  Recommend we repeat with an iron panel.  Dark tarry stools? None  Heavy menses? Yes- just started oral BCP- helping cycle greatly, reduced bleeding and cramping.       Type 2 diabetes mellitus without complication, without long-term current use of insulin ([facility])  NEW diagnosis  Current medications: none currently  Last A1c:   Lab Results   Component Value Date/Time    HBA1C 6.8 (H) 10/24/2020 0739    AVGLUC 148 10/24/2020 0739     Last Microalb/Cr ratio: No results found for: URCREAT, MICROALBUR, MALBCRT    Last retinal eye exam: Discussed patient needs to establish for eye screen. Will refer today.   ACEi/ARB? none  Statin? none  Aspirin? none  Concomitant HTN? no  Nightly foot checks? Discussed need for nightly foot checks today.       Hyperlipidemia  This is a chronic condition.   Latest Labs:   Lab Results   Component Value Date/Time    CHOLSTRLTOT 144 07/23/2020 07:47 AM    LDL 82 07/23/2020 07:47 AM    HDL 41 07/23/2020 07:47 AM    TRIGLYCERIDE 104 07/23/2020 07:47 AM      Medications: none      Snoring  Snoring nightly, dentist told her she should get evaluated for sleep apnea. Noticed she has small airway. Daytime sleepiness. Has been told she gasps for breath.   STOPBANG score:   4 (11/2/2020  9:20 AM)        Patient Active Problem List    Diagnosis Date Noted   • Snoring 11/02/2020   • Grieving 07/31/2020   • Abnormal CBC 07/29/2020   • Hyponatremia 07/29/2020   • Type 2 diabetes mellitus  without complication, without long-term current use of insulin (HCC) 05/20/2019   • Bilateral pes planus 05/20/2019   • Gastroesophageal reflux disease 03/18/2019   • Hyperlipidemia 05/31/2017   • Situational phobia 10/21/2016   • Vitamin D deficiency 10/14/2016   • Multiple food allergies 08/12/2016   • Obesity (BMI 35.0-39.9 without comorbidity) 08/12/2016      Allergies:Hydromorphone, Iodine, Sugammadex, Tree nuts food allergy, and Sunflower oil    Current Outpatient Medications   Medication Sig Dispense Refill   • omeprazole (PRILOSEC) 20 MG delayed-release capsule Take 1 Cap by mouth every day. 90 Cap 0   • ALPRAZolam (XANAX) 0.5 MG Tab Take 0.5 mg by mouth at bedtime as needed for Sleep.     • Norethin Ace-Eth Estrad-FE 1-20 MG-MCG(24) Tab Take 1 Tab by mouth every day. 84 Tab 3   • EPINEPHrine (EPIPEN INJ) 0.3 mg by Injection route as needed (anaphylaxis).     • vitamin D (CHOLECALCIFEROL) 1000 UNIT Tab Take 5,000 Units by mouth every day.     • hydrOXYzine HCl (ATARAX) 10 MG Tab Take 1 Tab by mouth every day. (Patient not taking: Reported on 8/13/2020) 30 Tab 0     No current facility-administered medications for this visit.        Social History     Tobacco Use   • Smoking status: Never Smoker   • Smokeless tobacco: Never Used   Substance Use Topics   • Alcohol use: Not Currently     Comment: 2 times every other month   • Drug use: No     Social History     Social History Narrative   • Not on file       Family History   Problem Relation Age of Onset   • Hypertension Mother    • Anxiety disorder Mother    • Alcohol abuse Mother    • Diabetes Father    • Anxiety disorder Maternal Aunt    • Drug abuse Maternal Aunt    • Anxiety disorder Maternal Uncle        Review of Systems:    Constitutional: No Fevers, Chills  Eyes: No vision changes  ENT: No hearing changes  Resp: No Shortness of breath  CV: No Chest pain  GI: No Nausea/Vomiting  MSK: No weakness  Skin: No rashes  Neuro: No Headaches  Psych: No Suicidal  ideations    All remaining systems reviewed and found to be negative, except as stated above.    Exam:    /62   Pulse 100   Temp 36.1 °C (97 °F) (Temporal)   Resp 12   Ht 1.524 m (5')   Wt 89.4 kg (197 lb)   SpO2 95%  Body mass index is 38.47 kg/m².    General:  Well nourished, well developed female in NAD  HENT: Atraumatic, normocephalic  EYES: Extraocular movements intact  NECK: Supple, FROM  CHEST: No deformities, Equal chest expansion  RESP: Unlabored, no stridor or audible wheeze  HEART: Regular Rate and rhythm.   ABD: Soft, Nontender, Non-Distended  Extremities: No Clubbing, Cyanosis, or Edema  Skin: Warm/dry, without rashes  Neuro: A/O x 4, due to COVID-19- did not have patient remove face mask to test cranial nerves.  Motor/sensory grossly intact  Psych: Normal behavior, normal affect      Lab review:  Labs are reviewed and discussed with a patient  Lab Results   Component Value Date/Time    CHOLSTRLTOT 144 07/23/2020 07:47 AM    LDL 82 07/23/2020 07:47 AM    HDL 41 07/23/2020 07:47 AM    TRIGLYCERIDE 104 07/23/2020 07:47 AM       Lab Results   Component Value Date/Time    SODIUM 135 10/24/2020 07:39 AM    POTASSIUM 4.5 10/24/2020 07:39 AM    CHLORIDE 103 10/24/2020 07:39 AM    CO2 21 10/24/2020 07:39 AM    GLUCOSE 103 (H) 10/24/2020 07:39 AM    BUN 12 10/24/2020 07:39 AM    CREATININE 0.60 10/24/2020 07:39 AM     Lab Results   Component Value Date/Time    ALKPHOSPHAT 72 07/23/2020 07:47 AM    ASTSGOT 12 07/23/2020 07:47 AM    ALTSGPT 13 07/23/2020 07:47 AM    TBILIRUBIN 0.3 07/23/2020 07:47 AM      Lab Results   Component Value Date/Time    HBA1C 6.8 (H) 10/24/2020 07:39 AM    HBA1C 6.0 (H) 07/23/2020 07:47 AM    HBA1C 5.9 (H) 09/14/2019 07:52 AM     No results found for: TSH  No results found for: FREET4    Lab Results   Component Value Date/Time    WBC 8.9 10/24/2020 07:39 AM    RBC 5.19 10/24/2020 07:39 AM    HEMOGLOBIN 12.6 10/24/2020 07:39 AM    HEMATOCRIT 42.2 10/24/2020 07:39 AM    MCV  81.3 (L) 10/24/2020 07:39 AM    MCH 24.3 (L) 10/24/2020 07:39 AM    MCHC 29.9 (L) 10/24/2020 07:39 AM    MPV 10.2 10/24/2020 07:39 AM    NEUTSPOLYS 63.80 10/24/2020 07:39 AM    LYMPHOCYTES 25.80 10/24/2020 07:39 AM    MONOCYTES 5.30 10/24/2020 07:39 AM    EOSINOPHILS 4.10 10/24/2020 07:39 AM    BASOPHILS 0.70 10/24/2020 07:39 AM          Assessment/Plan:  1. Abnormal CBC  2. Microcytosis  - IRON/TOTAL IRON BIND; Future  - FERRITIN; Future  - CBC WITH DIFFERENTIAL; Future  Evidence of microcytosis, history of iron deficiency anemia.  Patient does have heavy menses, just started a birth control pill 3 months ago that significantly improved her cycle, decreased bleeding and cramping.  Start iron supplementation for the next month, repeat labs in 3 months.  Recommend iron supplementation when she is on her cycle only after that.    3. Type 2 diabetes mellitus without complication, without long-term current use of insulin ([facility])  - REFERRAL FOR RETINAL SCREENING EXAM; Future  - Diabetic Monofilament LE Exam  - Microalbumin Creat Ratio Urine (Lab Collect); Future  - HEMOGLOBIN A1C; Future  New diagnosis today, discussed in detail today lifestyle modifications versus medication management.  She has her hemoglobin A1c is at 6.8 holding on medications.  Working on lifestyle modifications first.  Will refer for diabetes eye exam.  We will get a microalbumin with her next labs.    4. Mixed hyperlipidemia  Chronic condition, controlled per labs from July 2020.  Repeat 6 months.    5. Snoring  - REFERRAL TO PULMONARY AND SLEEP MEDICINE  Patient discussed with me today she does have nightly snoring, history of daytime fatigue and has been told she gasp for breath by friends in the past during the middle the night.  Her dentist also mentioned she should get a sleep apnea test, she has a small airway.  Will refer today.  Patient STOP-BANG score was 4.  Intermediate risk.    Follow-up: Return in about 3 months (around 2/2/2021) for  Follow up on labs.    Please note that this dictation was created using voice recognition software. I have made every reasonable attempt to correct obvious errors, but I expect that there are errors of grammar and possibly content that I did not discover before finalizing the note.

## 2020-11-02 NOTE — ASSESSMENT & PLAN NOTE
Snoring nightly, dentist told her she should get evaluated for sleep apnea. Noticed she has small airway. Daytime sleepiness. Has been told she gasps for breath.   STOPBANG score:   4 (11/2/2020  9:20 AM)

## 2020-11-02 NOTE — PATIENT INSTRUCTIONS
Start iron supplementation iron 325 mg (65 iron) with vitamin C 500 mg daily. Take for 1 month.     If you do eat carbs- make sure it is a high fiber content.

## 2020-12-07 RX ORDER — OMEPRAZOLE 20 MG/1
CAPSULE, DELAYED RELEASE ORAL
Qty: 90 CAP | Refills: 1 | Status: SHIPPED | OUTPATIENT
Start: 2020-12-07 | End: 2021-05-16 | Stop reason: SDUPTHER

## 2020-12-07 NOTE — TELEPHONE ENCOUNTER
Was the patient seen in the last year in this department? Yes    Does patient have an active prescription for medications requested? No     Received Request Via: Pharmacy      Pt met protocol?: Yes, last ov 11/2020   Lab Results   Component Value Date/Time    SODIUM 135 10/24/2020 07:39 AM    POTASSIUM 4.5 10/24/2020 07:39 AM    CHLORIDE 103 10/24/2020 07:39 AM    CO2 21 10/24/2020 07:39 AM    GLUCOSE 103 (H) 10/24/2020 07:39 AM    BUN 12 10/24/2020 07:39 AM    CREATININE 0.60 10/24/2020 07:39 AM

## 2021-05-18 ENCOUNTER — PATIENT MESSAGE (OUTPATIENT)
Dept: OBGYN | Facility: CLINIC | Age: 31
End: 2021-05-18

## 2021-05-18 RX ORDER — OMEPRAZOLE 20 MG/1
20 CAPSULE, DELAYED RELEASE ORAL
Qty: 90 CAPSULE | Refills: 1 | Status: SHIPPED | OUTPATIENT
Start: 2021-05-18 | End: 2021-08-16 | Stop reason: SDUPTHER

## 2021-05-19 RX ORDER — NORETHINDRONE ACETATE AND ETHINYL ESTRADIOL AND FERROUS FUMARATE 1MG-20(24)
1 KIT ORAL DAILY
Qty: 84 TABLET | Refills: 3 | Status: SHIPPED | OUTPATIENT
Start: 2021-05-19 | End: 2021-09-10

## 2021-06-08 RX ORDER — NORETHINDRONE ACETATE AND ETHINYL ESTRADIOL 1MG-20(21)
1 KIT ORAL DAILY
Qty: 84 TABLET | Refills: 3 | Status: SHIPPED | OUTPATIENT
Start: 2021-06-08 | End: 2022-02-23 | Stop reason: SDUPTHER

## 2021-08-09 ENCOUNTER — OFFICE VISIT (OUTPATIENT)
Dept: URGENT CARE | Facility: PHYSICIAN GROUP | Age: 31
End: 2021-08-09
Payer: COMMERCIAL

## 2021-08-09 VITALS
HEART RATE: 95 BPM | HEIGHT: 60 IN | OXYGEN SATURATION: 97 % | SYSTOLIC BLOOD PRESSURE: 122 MMHG | RESPIRATION RATE: 16 BRPM | BODY MASS INDEX: 37.3 KG/M2 | WEIGHT: 190 LBS | DIASTOLIC BLOOD PRESSURE: 88 MMHG | TEMPERATURE: 98.2 F

## 2021-08-09 DIAGNOSIS — S39.012A STRAIN OF LUMBAR REGION, INITIAL ENCOUNTER: ICD-10-CM

## 2021-08-09 PROCEDURE — 99213 OFFICE O/P EST LOW 20 MIN: CPT | Performed by: FAMILY MEDICINE

## 2021-08-09 RX ORDER — CYCLOBENZAPRINE HCL 5 MG
5-10 TABLET ORAL EVERY EVENING
Qty: 15 TABLET | Refills: 0 | Status: SHIPPED | OUTPATIENT
Start: 2021-08-09 | End: 2021-09-10

## 2021-08-09 ASSESSMENT — ENCOUNTER SYMPTOMS
VOMITING: 0
COUGH: 0
FEVER: 0
SORE THROAT: 0

## 2021-08-09 ASSESSMENT — FIBROSIS 4 INDEX: FIB4 SCORE: 0.25

## 2021-08-09 ASSESSMENT — PAIN SCALES - GENERAL: PAINLEVEL: 6=MODERATE PAIN

## 2021-08-09 NOTE — LETTER
August 9, 2021    To Whom It May Concern:         This is confirmation that Elinor Breaux attended her scheduled appointment with Kevan Fajardo M.D. on 8/09/21.         If you have any questions please do not hesitate to call me at the phone number listed below.    Sincerely,          Marissa St, Med Ass't  [phone]

## 2021-08-09 NOTE — LETTER
August 9, 2021    To Whom It May Concern:         This is confirmation that Elinor Breaux attended her scheduled appointment with Kevan Fajardo M.D. on 8/09/21.  Please excuse her from work today.           If you have any questions please do not hesitate to call me at the phone number listed below.    Sincerely,          Kevan Fajardo M.D.  [phone]

## 2021-08-09 NOTE — PROGRESS NOTES
Subjective:     Elinor Breaux is a 31 y.o. female who presents for Pain (left side pain onset cleaning yesterday)    HPI  Pt presents for evaluation of an acute problem  Pt with left side pain since yesterday   Pt was cleaning her bathroom and felt an acute sharp pain   Pain is in the left flank   Pain is constant and improving a little   Pain is worse with movement   Has some muscle spasms   Took Tylenol and helped a little     Review of Systems   Constitutional: Negative for fever.   HENT: Negative for sore throat.    Respiratory: Negative for cough.    Gastrointestinal: Negative for vomiting.   Skin: Negative for rash.     PMH:  has a past medical history of Allergy, Anxiety, Asthma, and Hypertension.  MEDS:   Current Outpatient Medications:   •  Norethin Ace-Eth Estrad-FE 1-20 MG-MCG(24) Tab, Take 1 tablet by mouth every day., Disp: 84 tablet, Rfl: 3  •  omeprazole (PRILOSEC) 20 MG delayed-release capsule, Take 1 capsule by mouth every day., Disp: 90 capsule, Rfl: 1  •  ALPRAZolam (XANAX) 0.5 MG Tab, Take 0.5 mg by mouth at bedtime as needed for Sleep., Disp: , Rfl:   •  hydrOXYzine HCl (ATARAX) 10 MG Tab, Take 1 Tab by mouth every day., Disp: 30 Tab, Rfl: 0  •  EPINEPHrine (EPIPEN INJ), 0.3 mg by Injection route as needed (anaphylaxis)., Disp: , Rfl:   •  vitamin D (CHOLECALCIFEROL) 1000 UNIT Tab, Take 5,000 Units by mouth every day., Disp: , Rfl:   •  norethindrone-ethinyl estradiol (JUNEL FE 1/20) 1-20 MG-MCG per tablet, Take 1 tablet by mouth every day., Disp: 84 tablet, Rfl: 3  ALLERGIES:   Allergies   Allergen Reactions   • Hydromorphone Anaphylaxis     Anaphylactic reaction at end of surgery 12/13/17. Will try to rule out Dilaudid vs. Sugammadex.    • Iodine Anaphylaxis     Seafood allergy    • Sugammadex Anaphylaxis     Anaphylaxitic reaction at end of surgery 12/13/17. Will try to rule out Dilaudid vs. Sugammadex.   • Tree Nuts Food Allergy Anaphylaxis     Peanut allergy   • Sunflower Oil       SURGHX:   Past Surgical History:   Procedure Laterality Date   • ELZA BY LAPAROSCOPY  12/13/2017    Procedure: ELZA BY LAPAROSCOPY;  Surgeon: Jordi Peña M.D.;  Location: SURGERY [facility];  Service: General     SOCHX:  reports that she has never smoked. She has never used smokeless tobacco. She reports previous alcohol use. She reports that she does not use drugs.     Objective:   /88   Pulse 95   Temp 36.8 °C (98.2 °F)   Resp 16   Ht 1.524 m (5')   Wt 86.2 kg (190 lb)   SpO2 97%   BMI 37.11 kg/m²     Physical Exam  Constitutional:       General: She is not in acute distress.     Appearance: She is well-developed. She is not diaphoretic.   Pulmonary:      Effort: Pulmonary effort is normal.   Neurological:      Mental Status: She is alert.     Back:  General: No bruising or erythema   Palpation: +TTP along the left flank and left lumbar paraspinal muscles    Assessment/Plan:   Assessment    1. Strain of lumbar region, initial encounter  - cyclobenzaprine (FLEXERIL) 5 mg tablet; Take 1-2 Tablets by mouth every evening.  Dispense: 15 tablet; Refill: 0    Pt with muscle strain.  Given Flexeril for nighttime use, reviewed stretches and heat.  Reviewed F/U precautions and will follow-up as needed

## 2021-08-16 RX ORDER — OMEPRAZOLE 20 MG/1
20 CAPSULE, DELAYED RELEASE ORAL
Qty: 90 CAPSULE | Refills: 1 | Status: SHIPPED | OUTPATIENT
Start: 2021-08-16 | End: 2021-12-15 | Stop reason: SDUPTHER

## 2021-08-16 NOTE — TELEPHONE ENCOUNTER
----- Message from Elinor Breaux sent at 8/16/2021 11:12 AM PDT -----  Regarding: Requesting a refill  Bisi Lal,    I have scheduled a visit in September, but prior to that I am requesting a refill on my omeprazole rx. I am unable to make that request on my chart through the medications tab.    I will also have my labs completed prior to the visit.    Thank you,    Elinor Breaux

## 2021-09-04 ENCOUNTER — HOSPITAL ENCOUNTER (OUTPATIENT)
Dept: LAB | Facility: MEDICAL CENTER | Age: 31
End: 2021-09-04
Attending: PHYSICIAN ASSISTANT
Payer: COMMERCIAL

## 2021-09-04 DIAGNOSIS — E11.9 TYPE 2 DIABETES MELLITUS WITHOUT COMPLICATION, WITHOUT LONG-TERM CURRENT USE OF INSULIN (HCC): ICD-10-CM

## 2021-09-04 DIAGNOSIS — R71.8 MICROCYTOSIS: ICD-10-CM

## 2021-09-04 LAB
BASOPHILS # BLD AUTO: 0.8 % (ref 0–1.8)
BASOPHILS # BLD: 0.07 K/UL (ref 0–0.12)
CREAT UR-MCNC: 126.63 MG/DL
EOSINOPHIL # BLD AUTO: 0.3 K/UL (ref 0–0.51)
EOSINOPHIL NFR BLD: 3.5 % (ref 0–6.9)
ERYTHROCYTE [DISTWIDTH] IN BLOOD BY AUTOMATED COUNT: 39.7 FL (ref 35.9–50)
EST. AVERAGE GLUCOSE BLD GHB EST-MCNC: 123 MG/DL
FERRITIN SERPL-MCNC: 31.4 NG/ML (ref 10–291)
HBA1C MFR BLD: 5.9 % (ref 4–5.6)
HCT VFR BLD AUTO: 48.6 % (ref 37–47)
HGB BLD-MCNC: 16.1 G/DL (ref 12–16)
IMM GRANULOCYTES # BLD AUTO: 0.04 K/UL (ref 0–0.11)
IMM GRANULOCYTES NFR BLD AUTO: 0.5 % (ref 0–0.9)
IRON SATN MFR SERPL: 9 % (ref 15–55)
IRON SERPL-MCNC: 38 UG/DL (ref 40–170)
LYMPHOCYTES # BLD AUTO: 2.71 K/UL (ref 1–4.8)
LYMPHOCYTES NFR BLD: 31.8 % (ref 22–41)
MCH RBC QN AUTO: 29.2 PG (ref 27–33)
MCHC RBC AUTO-ENTMCNC: 33.1 G/DL (ref 33.6–35)
MCV RBC AUTO: 88 FL (ref 81.4–97.8)
MICROALBUMIN UR-MCNC: <1.2 MG/DL
MICROALBUMIN/CREAT UR: NORMAL MG/G (ref 0–30)
MONOCYTES # BLD AUTO: 0.51 K/UL (ref 0–0.85)
MONOCYTES NFR BLD AUTO: 6 % (ref 0–13.4)
NEUTROPHILS # BLD AUTO: 4.9 K/UL (ref 2–7.15)
NEUTROPHILS NFR BLD: 57.4 % (ref 44–72)
NRBC # BLD AUTO: 0 K/UL
NRBC BLD-RTO: 0 /100 WBC
PLATELET # BLD AUTO: 372 K/UL (ref 164–446)
PMV BLD AUTO: 10.1 FL (ref 9–12.9)
RBC # BLD AUTO: 5.52 M/UL (ref 4.2–5.4)
TIBC SERPL-MCNC: 406 UG/DL (ref 250–450)
UIBC SERPL-MCNC: 368 UG/DL (ref 110–370)
WBC # BLD AUTO: 8.5 K/UL (ref 4.8–10.8)

## 2021-09-04 PROCEDURE — 36415 COLL VENOUS BLD VENIPUNCTURE: CPT

## 2021-09-04 PROCEDURE — 83036 HEMOGLOBIN GLYCOSYLATED A1C: CPT

## 2021-09-04 PROCEDURE — 83550 IRON BINDING TEST: CPT

## 2021-09-04 PROCEDURE — 82728 ASSAY OF FERRITIN: CPT

## 2021-09-04 PROCEDURE — 83540 ASSAY OF IRON: CPT

## 2021-09-04 PROCEDURE — 82043 UR ALBUMIN QUANTITATIVE: CPT

## 2021-09-04 PROCEDURE — 82570 ASSAY OF URINE CREATININE: CPT

## 2021-09-04 PROCEDURE — 85025 COMPLETE CBC W/AUTO DIFF WBC: CPT

## 2021-09-07 ENCOUNTER — TELEPHONE (OUTPATIENT)
Dept: MEDICAL GROUP | Facility: PHYSICIAN GROUP | Age: 31
End: 2021-09-07

## 2021-09-07 NOTE — TELEPHONE ENCOUNTER
----- Message from Hali Tan P.A.-C. sent at 9/7/2021 12:49 PM PDT -----  Patient has follow up scheduled with me 09/10/21, we will review their labs at that time, no further action needed right now.     Thank you,    Hali Tan PA-C

## 2021-09-10 ENCOUNTER — OFFICE VISIT (OUTPATIENT)
Dept: MEDICAL GROUP | Facility: PHYSICIAN GROUP | Age: 31
End: 2021-09-10
Payer: COMMERCIAL

## 2021-09-10 VITALS
DIASTOLIC BLOOD PRESSURE: 74 MMHG | OXYGEN SATURATION: 96 % | HEART RATE: 88 BPM | WEIGHT: 198.6 LBS | TEMPERATURE: 97.9 F | BODY MASS INDEX: 38.99 KG/M2 | SYSTOLIC BLOOD PRESSURE: 108 MMHG | RESPIRATION RATE: 16 BRPM | HEIGHT: 60 IN

## 2021-09-10 DIAGNOSIS — G47.39 OTHER SLEEP APNEA: ICD-10-CM

## 2021-09-10 DIAGNOSIS — R79.89 ABNORMAL CBC: ICD-10-CM

## 2021-09-10 DIAGNOSIS — K21.9 GASTROESOPHAGEAL REFLUX DISEASE, UNSPECIFIED WHETHER ESOPHAGITIS PRESENT: ICD-10-CM

## 2021-09-10 DIAGNOSIS — E11.9 TYPE 2 DIABETES MELLITUS WITHOUT COMPLICATION, WITHOUT LONG-TERM CURRENT USE OF INSULIN (HCC): ICD-10-CM

## 2021-09-10 DIAGNOSIS — F41.9 ANXIETY: ICD-10-CM

## 2021-09-10 PROCEDURE — 99213 OFFICE O/P EST LOW 20 MIN: CPT | Performed by: PHYSICIAN ASSISTANT

## 2021-09-10 RX ORDER — ALPRAZOLAM 0.5 MG/1
0.5 TABLET ORAL NIGHTLY PRN
Qty: 30 TABLET | Refills: 0 | Status: SHIPPED | OUTPATIENT
Start: 2021-09-10 | End: 2021-10-10

## 2021-09-10 ASSESSMENT — FIBROSIS 4 INDEX: FIB4 SCORE: 0.3

## 2021-09-10 NOTE — ASSESSMENT & PLAN NOTE
Because of patient's microcytosis found on her last CBC ordered an iron panel.  Which does show a normal ferritin but low iron saturation percent at 9.  Total iron at 38.  Patient was having heavy cycles but was started on oral birth control pills which did help.    She was originally taking iron- stopped due to constipation.     She was recently diagnosed with sleep apnea- but has not received machine yet, could be contributing factor.

## 2021-09-10 NOTE — PROGRESS NOTES
CC:   Chief Complaint   Patient presents with   • Follow-Up     Labs to check on sugar levels, iron.           HISTORY OF PRESENT ILLNESS: Patient is a 31 y.o. female established patient who presents today to follow up on the following conditions:       Health Maintenance: Completed  Decline pneumonia vaccine.     Type 2 diabetes mellitus without complication, without long-term current use of insulin ([facility])  Hemoglobin A1c greatly improved from 6.8 now at 5.9.    Abnormal CBC      Because of patient's microcytosis found on her last CBC ordered an iron panel.  Which does show a normal ferritin but low iron saturation percent at 9.  Total iron at 38.  Patient was having heavy cycles but was started on oral birth control pills which did help.    She was originally taking iron- stopped due to constipation.     She was recently diagnosed with sleep apnea- but has not received machine yet, could be contributing factor.     Other sleep apnea  Patient diagnosed with sleep apnea, has not received machine yet.     Gastroesophageal reflux disease  Taking omeprazole 20 mg daily. Works well.     Anxiety  Patient states she was using alprazolam only as needed for acute panic, insomnia or fear of flying. Uses rarely. No generalized anxiety.       Patient Active Problem List    Diagnosis Date Noted   • Other sleep apnea 09/10/2021   • Snoring 11/02/2020   • Grieving 07/31/2020   • Abnormal CBC 07/29/2020   • Hyponatremia 07/29/2020   • Type 2 diabetes mellitus without complication, without long-term current use of insulin ([facility]) 05/20/2019   • Bilateral pes planus 05/20/2019   • Gastroesophageal reflux disease 03/18/2019   • Hyperlipidemia 05/31/2017   • Situational phobia 10/21/2016   • Vitamin D deficiency 10/14/2016   • Anxiety 08/12/2016   • Multiple food allergies 08/12/2016   • Obesity (BMI 35.0-39.9 without comorbidity) 08/12/2016      Allergies:Hydromorphone, Iodine, Sugammadex, Tree nuts food allergy, Pumpkin seed, and  Sunflower oil    Current Outpatient Medications   Medication Sig Dispense Refill   • ALPRAZolam (XANAX) 0.5 MG Tab Take 1 Tablet by mouth at bedtime as needed for Sleep or Anxiety for up to 30 days. 30 Tablet 0   • omeprazole (PRILOSEC) 20 MG delayed-release capsule Take 1 Capsule by mouth every day. 90 Capsule 1   • norethindrone-ethinyl estradiol (JUNEL FE 1/20) 1-20 MG-MCG per tablet Take 1 tablet by mouth every day. 84 tablet 3   • EPINEPHrine (EPIPEN INJ) 0.3 mg by Injection route as needed (anaphylaxis).     • vitamin D (CHOLECALCIFEROL) 1000 UNIT Tab Take 5,000 Units by mouth every day.       No current facility-administered medications for this visit.       Social History     Tobacco Use   • Smoking status: Never Smoker   • Smokeless tobacco: Never Used   Vaping Use   • Vaping Use: Never used   Substance Use Topics   • Alcohol use: Not Currently     Comment: 2 times every other month   • Drug use: No     Social History     Social History Narrative   • Not on file       Family History   Problem Relation Age of Onset   • Hypertension Mother    • Anxiety disorder Mother    • Alcohol abuse Mother    • Diabetes Father    • Anxiety disorder Maternal Aunt    • Drug abuse Maternal Aunt    • Anxiety disorder Maternal Uncle        Review of Systems:    Constitutional: No Fevers, Chills  Eyes: No vision changes  ENT: No hearing changes  Resp: No Shortness of breath  CV: No Chest pain  GI: No Nausea/Vomiting  MSK: No weakness  Skin: No rashes  Neuro: No Headaches  Psych: No Suicidal ideations    All remaining systems reviewed and found to be negative, except as stated above.    Exam:    /74   Pulse 88   Temp 36.6 °C (97.9 °F) (Temporal)   Resp 16   Ht 1.524 m (5')   Wt 90.1 kg (198 lb 9.6 oz)   SpO2 96%  Body mass index is 38.79 kg/m².    General:  Well nourished, well developed female in NAD  HENT: Atraumatic, normocephalic  EYES: Extraocular movements intact  NECK: Supple, FROM  CHEST: No deformities,  Equal chest expansion  RESP: Unlabored, no stridor or audible wheeze  HEART: Regular Rate and rhythm.   Extremities: No Clubbing, Cyanosis, or Edema  Skin: Warm/dry, without rashes  Neuro: A/O x 4, due to COVID-19- did not have patient remove face mask to test cranial nerves.  Motor/sensory grossly intact  Psych: Normal behavior, normal affect      Lab review:  Labs are reviewed and discussed with a patient  Lab Results   Component Value Date/Time    CHOLSTRLTOT 144 07/23/2020 07:47 AM    LDL 82 07/23/2020 07:47 AM    HDL 41 07/23/2020 07:47 AM    TRIGLYCERIDE 104 07/23/2020 07:47 AM       Lab Results   Component Value Date/Time    SODIUM 135 10/24/2020 07:39 AM    POTASSIUM 4.5 10/24/2020 07:39 AM    CHLORIDE 103 10/24/2020 07:39 AM    CO2 21 10/24/2020 07:39 AM    GLUCOSE 103 (H) 10/24/2020 07:39 AM    BUN 12 10/24/2020 07:39 AM    CREATININE 0.60 10/24/2020 07:39 AM     Lab Results   Component Value Date/Time    ALKPHOSPHAT 72 07/23/2020 07:47 AM    ASTSGOT 12 07/23/2020 07:47 AM    ALTSGPT 13 07/23/2020 07:47 AM    TBILIRUBIN 0.3 07/23/2020 07:47 AM      Lab Results   Component Value Date/Time    HBA1C 5.9 (H) 09/04/2021 08:01 AM    HBA1C 6.8 (H) 10/24/2020 07:39 AM    HBA1C 6.0 (H) 07/23/2020 07:47 AM     No results found for: TSH  No results found for: FREET4    Lab Results   Component Value Date/Time    WBC 8.5 09/04/2021 08:01 AM    RBC 5.52 (H) 09/04/2021 08:01 AM    HEMOGLOBIN 16.1 (H) 09/04/2021 08:01 AM    HEMATOCRIT 48.6 (H) 09/04/2021 08:01 AM    MCV 88.0 09/04/2021 08:01 AM    MCH 29.2 09/04/2021 08:01 AM    MCHC 33.1 (L) 09/04/2021 08:01 AM    MPV 10.1 09/04/2021 08:01 AM    NEUTSPOLYS 57.40 09/04/2021 08:01 AM    LYMPHOCYTES 31.80 09/04/2021 08:01 AM    MONOCYTES 6.00 09/04/2021 08:01 AM    EOSINOPHILS 3.50 09/04/2021 08:01 AM    BASOPHILS 0.80 09/04/2021 08:01 AM          Assessment/Plan:  1. Type 2 diabetes mellitus without complication, without long-term current use of insulin (HCC)  Greatly  improved A1c, excellent work on lifestyle modifications.  We will continue regimen for now.  Repeat labs in 3 months.  2. Abnormal CBC  - CBC WITH DIFFERENTIAL; Future  Previously evidence of microcytosis, this is now corrected.  Patient's iron saturation is slightly low, however her hemoglobin and hematocrit are elevated.  Would suspect dehydration and/or untreated sleep apnea could be cause.  She will be getting her CPAP machine in the next week or 2.  Would like her to start her CPAP therapy and repeat her labs in the next 4 to 6 weeks to look for change or improvement.  If this is resolved I would probably recommend that she supplement iron approximately twice a week so that she does not trend towards anemia once again.  3. Other sleep apnea  Patient getting set up with CPAP machine, she will receive the next 1 to 2 weeks.  4. Gastroesophageal reflux disease, unspecified whether esophagitis present   Chronic condition.  Controlled patient continues omeprazole daily.  5. Anxiety  - ALPRAZolam (XANAX) 0.5 MG Tab; Take 1 Tablet by mouth at bedtime as needed for Sleep or Anxiety for up to 30 days.  Dispense: 30 Tablet; Refill: 0       Follow-up: Return in about 6 weeks (around 10/22/2021) for Follow up on labs.    Please note that this dictation was created using voice recognition software. I have made every reasonable attempt to correct obvious errors, but I expect that there are errors of grammar and possibly content that I did not discover before finalizing the note.

## 2021-09-10 NOTE — ASSESSMENT & PLAN NOTE
Patient states she was using alprazolam only as needed for acute panic, insomnia or fear of flying. Uses rarely. No generalized anxiety.

## 2021-11-23 ENCOUNTER — TELEPHONE (OUTPATIENT)
Dept: MEDICAL GROUP | Facility: PHYSICIAN GROUP | Age: 31
End: 2021-11-23

## 2021-11-24 NOTE — TELEPHONE ENCOUNTER
DOCUMENTATION OF PAR STATUS:    1. Name of Medication & Dose: omeprazole 20mg cap     2. Name of Prescription Coverage Company & phone #: [employer]    3. Date Prior Auth Submitted: 11/23/2021    4. What information was given to obtain insurance decision? Cover My Meds    5. Prior Auth Status? Pending    6. Patient Notified: yes

## 2021-12-04 NOTE — TELEPHONE ENCOUNTER
FINAL PRIOR AUTHORIZATION STATUS:    1.  Name of Medication & Dose: omeprazole     2. Prior Auth Status: Approved through 12/03/2022     3. Action Taken: Pharmacy Notified: yes Patient Notified: yes

## 2021-12-16 RX ORDER — OMEPRAZOLE 20 MG/1
20 CAPSULE, DELAYED RELEASE ORAL
Qty: 90 CAPSULE | Refills: 0 | Status: SHIPPED | OUTPATIENT
Start: 2021-12-16 | End: 2022-03-11 | Stop reason: SDUPTHER

## 2022-02-22 ENCOUNTER — PATIENT MESSAGE (OUTPATIENT)
Dept: OBGYN | Facility: CLINIC | Age: 32
End: 2022-02-22
Payer: COMMERCIAL

## 2022-02-23 RX ORDER — NORETHINDRONE ACETATE AND ETHINYL ESTRADIOL 1MG-20(21)
1 KIT ORAL DAILY
Qty: 84 TABLET | Refills: 3 | Status: SHIPPED | OUTPATIENT
Start: 2022-02-23 | End: 2023-06-19

## 2022-03-04 ENCOUNTER — OFFICE VISIT (OUTPATIENT)
Dept: MEDICAL GROUP | Facility: PHYSICIAN GROUP | Age: 32
End: 2022-03-04
Payer: COMMERCIAL

## 2022-03-04 VITALS
SYSTOLIC BLOOD PRESSURE: 130 MMHG | WEIGHT: 206.38 LBS | BODY MASS INDEX: 40.52 KG/M2 | HEIGHT: 60 IN | HEART RATE: 95 BPM | TEMPERATURE: 98.3 F | DIASTOLIC BLOOD PRESSURE: 76 MMHG | RESPIRATION RATE: 18 BRPM | OXYGEN SATURATION: 96 %

## 2022-03-04 DIAGNOSIS — R79.89 ABNORMAL CBC: ICD-10-CM

## 2022-03-04 DIAGNOSIS — E78.2 MIXED HYPERLIPIDEMIA: ICD-10-CM

## 2022-03-04 DIAGNOSIS — E11.9 TYPE 2 DIABETES MELLITUS WITHOUT COMPLICATION, WITHOUT LONG-TERM CURRENT USE OF INSULIN (HCC): ICD-10-CM

## 2022-03-04 DIAGNOSIS — E66.01 MORBID OBESITY WITH BMI OF 40.0-44.9, ADULT (HCC): ICD-10-CM

## 2022-03-04 DIAGNOSIS — K21.9 GASTROESOPHAGEAL REFLUX DISEASE, UNSPECIFIED WHETHER ESOPHAGITIS PRESENT: ICD-10-CM

## 2022-03-04 DIAGNOSIS — G47.39 OTHER SLEEP APNEA: ICD-10-CM

## 2022-03-04 PROCEDURE — 99214 OFFICE O/P EST MOD 30 MIN: CPT

## 2022-03-04 ASSESSMENT — FIBROSIS 4 INDEX: FIB4 SCORE: 0.3

## 2022-03-04 ASSESSMENT — PATIENT HEALTH QUESTIONNAIRE - PHQ9: CLINICAL INTERPRETATION OF PHQ2 SCORE: 0

## 2022-03-04 NOTE — LETTER
[facility]  SHAY Giron  [address]  [place] [address]  Fax: [phone]   Authorization for Release/Disclosure of   Protected Health Information   Name: ELINOR DING : 1990 SSN: [id number]   [place] [facility] [address] Phone:    [phone] (home)    I authorize the entity listed below to release/disclose the PHI below to:   [facility]/SHAY Giron and SHAY Giron   Provider or Entity Name: Kelly Celaya/ Dr Hermes Almanza     Address [address]  [facility]    Phone:      Fax:     Reason for request: continuity of care   Information to be released:    [  ] LAST COLONOSCOPY,  including any PATH REPORT and follow-up  [  ] LAST FIT/COLOGUARD RESULT [  ] LAST DEXA  [  ] LAST MAMMOGRAM  [  ] LAST PAP  [  ] LAST LABS [XX  ] RETINA EXAM REPORT  [  ] IMMUNIZATION RECORDS  [  ] Release all info      [  ] Check here and initial the line next to each item to release ALL health information INCLUDING  _____ Care and treatment for drug and / or alcohol abuse  _____ HIV testing, infection status, or AIDS  _____ Genetic Testing    DATES OF SERVICE OR TIME PERIOD TO BE DISCLOSED: _____________  I understand and acknowledge that:  * This Authorization may be revoked at any time by you in writing, except if your health information has already been used or disclosed.  * Your health information that will be used or disclosed as a result of you signing this authorization could be re-disclosed by the recipient. If this occurs, your re-disclosed health information may no longer be protected by State or Federal laws.  * You may refuse to sign this Authorization. Your refusal will not affect your ability to obtain treatment.  * This Authorization becomes effective upon signing and will  on (date) __________.      If no date is indicated, this Authorization will  one (1) year from the signature date.    Name: Elinor BOLANOS  Saeid    Signature:continuity of care   Date:     3/4/2022       PLEASE FAX REQUESTED RECORDS BACK TO: [phone]

## 2022-03-04 NOTE — ASSESSMENT & PLAN NOTE
Patient reports that she did get her CPAP device, but is claustrophobic and is having difficulty tolerating the mask.  She is looking into other options such as the nasal cannula option through [employer]

## 2022-03-04 NOTE — ASSESSMENT & PLAN NOTE
Patient reports that she takes omeprazole 20 mg daily for management of her GERD.  This is a chronic problem

## 2022-03-04 NOTE — ASSESSMENT & PLAN NOTE
Last lipid panel as below  Results for MARQUITA DING (MRN [id number]) as of 3/4/2022 13:57   Ref. Range 7/23/2020 07:47   Cholesterol,Tot Latest Ref Range: 100 - 199 mg/dL 144   Triglycerides Latest Ref Range: 0 - 149 mg/dL 104   HDL Latest Ref Range: >=40 mg/dL 41   LDL Latest Ref Range: <100 mg/dL 82     Patient is due for updated check

## 2022-03-04 NOTE — PROGRESS NOTES
CC:   Chief Complaint   Patient presents with   • Establish Care        HISTORY OF PRESENT ILLNESS: Patient is a 31 y.o. female established patient who presents today to discuss the following problems below:     Type 2 diabetes mellitus without complication, without long-term current use of insulin ([facility])  Patient reports that she continues to manage her diabetes with diet.  Last hemoglobin A1c was excellent at 5.9.  She is established with an optometrist, and also had an ophthalmologic evaluation last year.  She cannot remember the name of the doctor that she went to, but will send us a message on my chart with this information    Gastroesophageal reflux disease  Patient reports that she takes omeprazole 20 mg daily for management of her GERD.  This is a chronic problem    Hyperlipidemia  Last lipid panel as below  Results for MARQUITA DING (MRN [id number]) as of 3/4/2022 13:57   Ref. Range 7/23/2020 07:47   Cholesterol,Tot Latest Ref Range: 100 - 199 mg/dL 144   Triglycerides Latest Ref Range: 0 - 149 mg/dL 104   HDL Latest Ref Range: >=40 mg/dL 41   LDL Latest Ref Range: <100 mg/dL 82     Patient is due for updated check    Other sleep apnea  Patient reports that she did get her CPAP device, but is claustrophobic and is having difficulty tolerating the mask.  She is looking into other options such as the nasal cannula option through [employer]    Past Medical History:   Diagnosis Date   • Allergy     food allergies: nuts and seafood, carries epipen   • Anxiety    • Asthma     childhood, no symptoms since age 8   • Hypertension        Allergies:Hydromorphone, Iodine, Sugammadex, Tree nuts food allergy, Pumpkin seed, and Sunflower oil    Review of Systems: Otherwise negative except for as stated above.      Exam: /76 (BP Location: Left arm, Patient Position: Sitting, BP Cuff Size: Adult)   Pulse 95   Temp 36.8 °C (98.3 °F) (Temporal)   Resp 18   Ht 1.524 m (5')   Wt 93.6 kg (206 lb 6 oz)   SpO2 96%  Body  mass index is 40.3 kg/m².    Gen: Alert and oriented x4. Well developed, well-nourished female in no apparent distress.  Skin: Warm, dry, good turgor, no rashes in visible areas or lacerations appreciated.   Eye: EOM intact, pupils equal, round and reactive, conjunctiva clear, lids normal.  Neck: Trachea midline, no masses, no thyromegaly  GI:  Soft, non-tender abdomen with no distention.   MSK: Normal gait, moves all extremities.  Neuro: Alert and oriented x 4, non-focal exam with motor and sensory grossly intact.   Ext: No clubbing, cyanosis, edema.  Psych: Normal behavior, affect and mood.      Assessment/Plan:  31 y.o. female with the following -    1. Abnormal CBC  Chronic condition.  Patient is being followed for elevated hemoglobin.  Suspected secondary to untreated HAYLEE.  Patient does admit that she has not been wearing her device secondary to claustrophobia.  Discussed oral appliance device today, concerned that it may interfere with her current retainers.  She will try the nasal device  - CBC WITHOUT DIFFERENTIAL; Future    2. Type 2 diabetes mellitus without complication, without long-term current use of insulin (HCC)  Chronic condition, stable.  Due for updated labs as below.  Continues dietary management  - Comp Metabolic Panel; Future  - HEMOGLOBIN A1C; Future  - Lipid Profile; Future  - Referral for Retinal Screening Exam; Future  - Diabetic Monofilament LE Exam    3. Morbid obesity with BMI of 40.0-44.9, adult (HCC)  Chronic condition, stable.  Patient continues dietary changes and management of her prediabetes  - Patient identified as having weight management issue.  Appropriate orders and counseling given.    4. Gastroesophageal reflux disease, unspecified whether esophagitis present  Chronic condition, stable.  No refill needed of omeprazole today.  When she does need a refill, she prefers to have this refilled through [employer]    5. Other sleep apnea  Chronic condition, patient does have a CPAP  device, but is having difficulties with the mask secondary to claustrophobia.  She will obtain the nasal cannula and let me know if this does not work      Follow-up: Return in about 1 year (around 3/4/2023) for annual .    Health Maintenance: Completed.  Patient to message regarding where she had her ophthalmologic eye exam done last year      Please note that this dictation was created using voice recognition software. I have made every reasonable attempt to correct obvious errors, but I expect that there are errors of grammar and possibly content that I did not discover before finalizing the note.    Electronically signed by ROSLYN Licona on March 4, 2022

## 2022-03-04 NOTE — ASSESSMENT & PLAN NOTE
Patient reports that she continues to manage her diabetes with diet.  Last hemoglobin A1c was excellent at 5.9.  She is established with an optometrist, and also had an ophthalmologic evaluation last year.  She cannot remember the name of the doctor that she went to, but will send us a message on my chart with this information

## 2022-09-10 ENCOUNTER — HOSPITAL ENCOUNTER (OUTPATIENT)
Dept: LAB | Facility: MEDICAL CENTER | Age: 32
End: 2022-09-10
Payer: COMMERCIAL

## 2022-09-10 DIAGNOSIS — R79.89 ABNORMAL CBC: ICD-10-CM

## 2022-09-10 DIAGNOSIS — E11.9 TYPE 2 DIABETES MELLITUS WITHOUT COMPLICATION, WITHOUT LONG-TERM CURRENT USE OF INSULIN (HCC): ICD-10-CM

## 2022-09-10 LAB
ALBUMIN SERPL BCP-MCNC: 4.2 G/DL (ref 3.2–4.9)
ALBUMIN/GLOB SERPL: 1.3 G/DL
ALP SERPL-CCNC: 82 U/L (ref 30–99)
ALT SERPL-CCNC: 24 U/L (ref 2–50)
ANION GAP SERPL CALC-SCNC: 11 MMOL/L (ref 7–16)
AST SERPL-CCNC: 20 U/L (ref 12–45)
BILIRUB SERPL-MCNC: 0.2 MG/DL (ref 0.1–1.5)
BUN SERPL-MCNC: 12 MG/DL (ref 8–22)
CALCIUM SERPL-MCNC: 9.2 MG/DL (ref 8.5–10.5)
CHLORIDE SERPL-SCNC: 102 MMOL/L (ref 96–112)
CHOLEST SERPL-MCNC: 208 MG/DL (ref 100–199)
CO2 SERPL-SCNC: 22 MMOL/L (ref 20–33)
CREAT SERPL-MCNC: 0.55 MG/DL (ref 0.5–1.4)
ERYTHROCYTE [DISTWIDTH] IN BLOOD BY AUTOMATED COUNT: 41 FL (ref 35.9–50)
EST. AVERAGE GLUCOSE BLD GHB EST-MCNC: 126 MG/DL
GFR SERPLBLD CREATININE-BSD FMLA CKD-EPI: 123 ML/MIN/1.73 M 2
GLOBULIN SER CALC-MCNC: 3.2 G/DL (ref 1.9–3.5)
GLUCOSE SERPL-MCNC: 118 MG/DL (ref 65–99)
HBA1C MFR BLD: 6 % (ref 4–5.6)
HCT VFR BLD AUTO: 44.3 % (ref 37–47)
HDLC SERPL-MCNC: 44 MG/DL
HGB BLD-MCNC: 15.3 G/DL (ref 12–16)
LDLC SERPL CALC-MCNC: 134 MG/DL
MCH RBC QN AUTO: 29.8 PG (ref 27–33)
MCHC RBC AUTO-ENTMCNC: 34.5 G/DL (ref 33.6–35)
MCV RBC AUTO: 86.4 FL (ref 81.4–97.8)
PLATELET # BLD AUTO: 401 K/UL (ref 164–446)
PMV BLD AUTO: 9.7 FL (ref 9–12.9)
POTASSIUM SERPL-SCNC: 4.1 MMOL/L (ref 3.6–5.5)
PROT SERPL-MCNC: 7.4 G/DL (ref 6–8.2)
RBC # BLD AUTO: 5.13 M/UL (ref 4.2–5.4)
SODIUM SERPL-SCNC: 135 MMOL/L (ref 135–145)
TRIGL SERPL-MCNC: 149 MG/DL (ref 0–149)
WBC # BLD AUTO: 10 K/UL (ref 4.8–10.8)

## 2022-09-10 PROCEDURE — 36415 COLL VENOUS BLD VENIPUNCTURE: CPT

## 2022-09-10 PROCEDURE — 85027 COMPLETE CBC AUTOMATED: CPT

## 2022-09-10 PROCEDURE — 80053 COMPREHEN METABOLIC PANEL: CPT

## 2022-09-10 PROCEDURE — 83036 HEMOGLOBIN GLYCOSYLATED A1C: CPT

## 2022-09-10 PROCEDURE — 80061 LIPID PANEL: CPT

## 2022-09-23 ENCOUNTER — HOSPITAL ENCOUNTER (OUTPATIENT)
Facility: MEDICAL CENTER | Age: 32
End: 2022-09-23
Payer: COMMERCIAL

## 2022-09-23 ENCOUNTER — OFFICE VISIT (OUTPATIENT)
Dept: MEDICAL GROUP | Facility: PHYSICIAN GROUP | Age: 32
End: 2022-09-23
Payer: COMMERCIAL

## 2022-09-23 VITALS
TEMPERATURE: 98.3 F | WEIGHT: 207 LBS | RESPIRATION RATE: 16 BRPM | DIASTOLIC BLOOD PRESSURE: 72 MMHG | HEART RATE: 91 BPM | BODY MASS INDEX: 40.64 KG/M2 | SYSTOLIC BLOOD PRESSURE: 110 MMHG | HEIGHT: 60 IN | OXYGEN SATURATION: 94 %

## 2022-09-23 DIAGNOSIS — N92.6 ABNORMAL MENSTRUAL PERIODS: ICD-10-CM

## 2022-09-23 DIAGNOSIS — U07.1 COVID: ICD-10-CM

## 2022-09-23 DIAGNOSIS — E78.2 MIXED HYPERLIPIDEMIA: ICD-10-CM

## 2022-09-23 DIAGNOSIS — J45.20 MILD INTERMITTENT ASTHMA WITHOUT COMPLICATION: ICD-10-CM

## 2022-09-23 DIAGNOSIS — E11.9 TYPE 2 DIABETES MELLITUS WITHOUT COMPLICATION, WITHOUT LONG-TERM CURRENT USE OF INSULIN (HCC): ICD-10-CM

## 2022-09-23 PROCEDURE — 99214 OFFICE O/P EST MOD 30 MIN: CPT

## 2022-09-23 PROCEDURE — 82570 ASSAY OF URINE CREATININE: CPT

## 2022-09-23 PROCEDURE — 82043 UR ALBUMIN QUANTITATIVE: CPT

## 2022-09-23 RX ORDER — ALBUTEROL SULFATE 90 UG/1
2 AEROSOL, METERED RESPIRATORY (INHALATION) EVERY 4 HOURS PRN
Qty: 1 EACH | Refills: 0 | Status: SHIPPED | OUTPATIENT
Start: 2022-09-23

## 2022-09-23 RX ORDER — MAGNESIUM GLUCONATE 27 MG(500)
500 TABLET ORAL DAILY
COMMUNITY

## 2022-09-23 ASSESSMENT — FIBROSIS 4 INDEX: FIB4 SCORE: 0.35

## 2022-09-23 NOTE — ASSESSMENT & PLAN NOTE
Patient reports that she has been struggling with extremely heavy menstrual cycles that are quite painful.  She was placed on oral contraceptives with better management of this.  However, her sister does have PCOS and with her noted insulin resistance, she wonders if she should have further work-up

## 2022-09-23 NOTE — PATIENT INSTRUCTIONS
Diabetes Mellitus and Nutrition, Adult  When you have diabetes (diabetes mellitus), it is very important to have healthy eating habits because your blood sugar (glucose) levels are greatly affected by what you eat and drink. Eating healthy foods in the appropriate amounts, at about the same times every day, can help you:  Control your blood glucose.  Lower your risk of heart disease.  Improve your blood pressure.  Reach or maintain a healthy weight.  Every person with diabetes is different, and each person has different needs for a meal plan. Your health care provider may recommend that you work with a diet and nutrition specialist (dietitian) to make a meal plan that is best for you. Your meal plan may vary depending on factors such as:  The calories you need.  The medicines you take.  Your weight.  Your blood glucose, blood pressure, and cholesterol levels.  Your activity level.  Other health conditions you have, such as heart or kidney disease.  How do carbohydrates affect me?  Carbohydrates, also called carbs, affect your blood glucose level more than any other type of food. Eating carbs naturally raises the amount of glucose in your blood. Carb counting is a method for keeping track of how many carbs you eat. Counting carbs is important to keep your blood glucose at a healthy level, especially if you use insulin or take certain oral diabetes medicines.  It is important to know how many carbs you can safely have in each meal. This is different for every person. Your dietitian can help you calculate how many carbs you should have at each meal and for each snack.  Foods that contain carbs include:  Bread, cereal, rice, pasta, and crackers.  Potatoes and corn.  Peas, beans, and lentils.  Milk and yogurt.  Fruit and juice.  Desserts, such as cakes, cookies, ice cream, and candy.  How does alcohol affect me?  Alcohol can cause a sudden decrease in blood glucose (hypoglycemia), especially if you use insulin or take  "certain oral diabetes medicines. Hypoglycemia can be a life-threatening condition. Symptoms of hypoglycemia (sleepiness, dizziness, and confusion) are similar to symptoms of having too much alcohol.  If your health care provider says that alcohol is safe for you, follow these guidelines:  Limit alcohol intake to no more than 1 drink per day for nonpregnant women and 2 drinks per day for men. One drink equals 12 oz of beer, 5 oz of wine, or 1½ oz of hard liquor.  Do not drink on an empty stomach.  Keep yourself hydrated with water, diet soda, or unsweetened iced tea.  Keep in mind that regular soda, juice, and other mixers may contain a lot of sugar and must be counted as carbs.  What are tips for following this plan?    Reading food labels  Start by checking the serving size on the \"Nutrition Facts\" label of packaged foods and drinks. The amount of calories, carbs, fats, and other nutrients listed on the label is based on one serving of the item. Many items contain more than one serving per package.  Check the total grams (g) of carbs in one serving. You can calculate the number of servings of carbs in one serving by dividing the total carbs by 15. For example, if a food has 30 g of total carbs, it would be equal to 2 servings of carbs.  Check the number of grams (g) of saturated and trans fats in one serving. Choose foods that have low or no amount of these fats.  Check the number of milligrams (mg) of salt (sodium) in one serving. Most people should limit total sodium intake to less than 2,300 mg per day.  Always check the nutrition information of foods labeled as \"low-fat\" or \"nonfat\". These foods may be higher in added sugar or refined carbs and should be avoided.  Talk to your dietitian to identify your daily goals for nutrients listed on the label.  Shopping  Avoid buying canned, premade, or processed foods. These foods tend to be high in fat, sodium, and added sugar.  Shop around the outside edge of the " grocery store. This includes fresh fruits and vegetables, bulk grains, fresh meats, and fresh dairy.  Cooking  Use low-heat cooking methods, such as baking, instead of high-heat cooking methods like deep frying.  Cook using healthy oils, such as olive, canola, or sunflower oil.  Avoid cooking with butter, cream, or high-fat meats.  Meal planning  Eat meals and snacks regularly, preferably at the same times every day. Avoid going long periods of time without eating.  Eat foods high in fiber, such as fresh fruits, vegetables, beans, and whole grains. Talk to your dietitian about how many servings of carbs you can eat at each meal.  Eat 4-6 ounces (oz) of lean protein each day, such as lean meat, chicken, fish, eggs, or tofu. One oz of lean protein is equal to:  1 oz of meat, chicken, or fish.  1 egg.  ¼ cup of tofu.  Eat some foods each day that contain healthy fats, such as avocado, nuts, seeds, and fish.  Lifestyle  Check your blood glucose regularly.  Exercise regularly as told by your health care provider. This may include:  150 minutes of moderate-intensity or vigorous-intensity exercise each week. This could be brisk walking, biking, or water aerobics.  Stretching and doing strength exercises, such as yoga or weightlifting, at least 2 times a week.  Take medicines as told by your health care provider.  Do not use any products that contain nicotine or tobacco, such as cigarettes and e-cigarettes. If you need help quitting, ask your health care provider.  Work with a counselor or diabetes educator to identify strategies to manage stress and any emotional and social challenges.  Questions to ask a health care provider  Do I need to meet with a diabetes educator?  Do I need to meet with a dietitian?  What number can I call if I have questions?  When are the best times to check my blood glucose?  Where to find more information:  American Diabetes Association: diabetes.org  Academy of Nutrition and Dietetics:  www.eatright.org  National [place] of Diabetes and Digestive and Kidney Diseases (NIH): www.niddk.nih.gov  Summary  A healthy meal plan will help you control your blood glucose and maintain a healthy lifestyle.  Working with a diet and nutrition specialist (dietitian) can help you make a meal plan that is best for you.  Keep in mind that carbohydrates (carbs) and alcohol have immediate effects on your blood glucose levels. It is important to count carbs and to use alcohol carefully.  This information is not intended to replace advice given to you by your health care provider. Make sure you discuss any questions you have with your health care provider.  Document Released: 09/14/2006 Document Revised: 11/30/2018 Document Reviewed: 01/22/2018  [employer] Patient Education © 2020 [employer]

## 2022-09-23 NOTE — ASSESSMENT & PLAN NOTE
Patient reports that in July she got COVID, and has had somewhat of a lingering cough since then.  She does have a history of childhood asthma and feels that she is sometimes tight in her chest with wheezing.  She denies shortness of breath.  She does not have an inhaler

## 2022-09-23 NOTE — ASSESSMENT & PLAN NOTE
Patient presents to review the results of her cholesterol panel.  She does admit that she and her  have been eating more red meat lately.   Latest Reference Range & Units 9/10/22 09:37   Cholesterol,Tot 100 - 199 mg/dL 208 (H)   Triglycerides 0 - 149 mg/dL 149   HDL >=40 mg/dL 44   LDL <100 mg/dL 134 (H)   (H): Data is abnormally high

## 2022-09-23 NOTE — PROGRESS NOTES
CC:   Chief Complaint   Patient presents with    Lab Results    Follow-Up        HISTORY OF PRESENT ILLNESS: Patient is a 32 y.o. female established patient who presents today to discuss the following problems below:     Hyperlipidemia  Patient presents to review the results of her cholesterol panel.  She does admit that she and her  have been eating more red meat lately.   Latest Reference Range & Units 9/10/22 09:37   Cholesterol,Tot 100 - 199 mg/dL 208 (H)   Triglycerides 0 - 149 mg/dL 149   HDL >=40 mg/dL 44   LDL <100 mg/dL 134 (H)   (H): Data is abnormally high    COVID  Patient reports that in July she got COVID, and has had somewhat of a lingering cough since then.  She does have a history of childhood asthma and feels that she is sometimes tight in her chest with wheezing.  She denies shortness of breath.  She does not have an inhaler    Abnormal menstrual periods  Patient reports that she has been struggling with extremely heavy menstrual cycles that are quite painful.  She was placed on oral contraceptives with better management of this.  However, her sister does have PCOS and with her noted insulin resistance, she wonders if she should have further work-up      Past Medical History:   Diagnosis Date    Allergy     food allergies: nuts and seafood, carries epipen    Anxiety     Asthma     childhood, no symptoms since age 8    Hypertension        Allergies:Hydromorphone, Iodine, Sugammadex, Tree nuts food allergy, Pumpkin seed, and Sunflower oil    Review of Systems: Otherwise negative except for as stated above.      Exam: /72 (BP Location: Left arm, Patient Position: Sitting, BP Cuff Size: Large adult)   Pulse 91   Temp 36.8 °C (98.3 °F) (Temporal)   Resp 16   Ht 1.524 m (5')   Wt 93.9 kg (207 lb)   SpO2 94%  Body mass index is 40.43 kg/m².    Gen: Alert and oriented x4. Well developed, well-nourished female in no apparent distress.  Skin: Warm, dry, good turgor, no rashes in visible  areas or lacerations appreciated.   Eye: EOM intact, pupils equal, round and reactive, conjunctiva clear, lids normal.  Neck: Trachea midline, no masses, no thyromegaly  GI:  Soft, non-tender abdomen with no distention.   MSK: Normal gait, moves all extremities.  Neuro: Alert and oriented x 4, non-focal exam with motor and sensory grossly intact.  Ext: No clubbing, cyanosis, edema.  Psych: Normal behavior, affect and mood.      Assessment/Plan:  32 y.o. female with the following -    1. Type 2 diabetes mellitus without complication, without long-term current use of insulin (HCC)  Chronic condition, stable.  Hemoglobin A1c remains stable at 6%.  Patient has done well managing this with diet and exercise, and she is highly motivated to continue this if she does want to try to conceive next year.  Unfortunately she was unable to provide a urine sample in the office today.  Lab orders placed.  Recheck hemoglobin A1c in 3 months  - Microalbumin Creat Ratio Urine - Lab Collect; Future  - HEMOGLOBIN A1C; Future    2. Mixed hyperlipidemia  Chronic condition, progressive.  Patient has not previously had elevated total cholesterol or LDL cholesterol.  She will work on her diet and activity and recheck lipids in 3 months  - Lipid Profile; Future    3. Mild intermittent asthma without complication  Chronic condition, stable.  Discussed with patient that it would be reasonable for her to have an albuterol inhaler on hand with her history of asthma and recently having COVID.  Instructed patient to only take the albuterol inhaler for chest tightness or wheezing.  Patient verbalized understanding  - albuterol 108 (90 Base) MCG/ACT Aero Soln inhalation aerosol; Inhale 2 Puffs every four hours as needed for Shortness of Breath.  Dispense: 1 Each; Refill: 0    5. Abnormal menstrual periods  Chronic condition, unclear etiology.  Patient defers ultrasound for now.  Obtain the following labs to rule out PCOS.  - 17-OH PROGESTERONE;  Future  - DHEA SULFATE; Future  - FSH/LH; Future  - PROLACTIN; Future  - TESTOSTERONE F&T FEMALES/CHILD; Future      Follow-up: Return in about 3 months (around 12/23/2022) for recheck labs .    Health Maintenance: Completed      Please note that this dictation was created using voice recognition software. I have made every reasonable attempt to correct obvious errors, but I expect that there are errors of grammar and possibly content that I did not discover before finalizing the note.    Electronically signed by ROSLYN Licona on September 23, 2022

## 2022-09-24 LAB
CREAT UR-MCNC: 212.6 MG/DL
MICROALBUMIN UR-MCNC: 1.3 MG/DL
MICROALBUMIN/CREAT UR: 6 MG/G (ref 0–30)

## 2022-12-02 ENCOUNTER — HOSPITAL ENCOUNTER (OUTPATIENT)
Dept: LAB | Facility: MEDICAL CENTER | Age: 32
End: 2022-12-02
Payer: COMMERCIAL

## 2022-12-02 DIAGNOSIS — E78.2 MIXED HYPERLIPIDEMIA: ICD-10-CM

## 2022-12-02 DIAGNOSIS — E11.9 TYPE 2 DIABETES MELLITUS WITHOUT COMPLICATION, WITHOUT LONG-TERM CURRENT USE OF INSULIN (HCC): ICD-10-CM

## 2022-12-02 DIAGNOSIS — N92.6 ABNORMAL MENSTRUAL PERIODS: ICD-10-CM

## 2022-12-02 LAB
CHOLEST SERPL-MCNC: 204 MG/DL (ref 100–199)
DHEA-S SERPL-MCNC: 256 UG/DL (ref 60.9–337)
EST. AVERAGE GLUCOSE BLD GHB EST-MCNC: 128 MG/DL
FSH SERPL-ACNC: 2.9 MIU/ML
HBA1C MFR BLD: 6.1 % (ref 4–5.6)
HDLC SERPL-MCNC: 40 MG/DL
LDLC SERPL CALC-MCNC: 129 MG/DL
LH SERPL-ACNC: 1.7 IU/L
PROLACTIN SERPL-MCNC: 35 NG/ML (ref 2.8–26)
TRIGL SERPL-MCNC: 173 MG/DL (ref 0–149)

## 2022-12-02 PROCEDURE — 84403 ASSAY OF TOTAL TESTOSTERONE: CPT

## 2022-12-02 PROCEDURE — 36415 COLL VENOUS BLD VENIPUNCTURE: CPT

## 2022-12-02 PROCEDURE — 83498 ASY HYDROXYPROGESTERONE 17-D: CPT

## 2022-12-02 PROCEDURE — 83001 ASSAY OF GONADOTROPIN (FSH): CPT

## 2022-12-02 PROCEDURE — 84402 ASSAY OF FREE TESTOSTERONE: CPT

## 2022-12-02 PROCEDURE — 80061 LIPID PANEL: CPT

## 2022-12-02 PROCEDURE — 83036 HEMOGLOBIN GLYCOSYLATED A1C: CPT

## 2022-12-02 PROCEDURE — 83002 ASSAY OF GONADOTROPIN (LH): CPT

## 2022-12-02 PROCEDURE — 84270 ASSAY OF SEX HORMONE GLOBUL: CPT

## 2022-12-02 PROCEDURE — 82627 DEHYDROEPIANDROSTERONE: CPT

## 2022-12-02 PROCEDURE — 84146 ASSAY OF PROLACTIN: CPT

## 2022-12-08 LAB — 17OHP SERPL-MCNC: 31.94 NG/DL

## 2022-12-09 LAB
SHBG SERPL-SCNC: 111 NMOL/L (ref 25–122)
TESTOST FREE SERPL-MCNC: 2.3 PG/ML (ref 1.3–9.2)
TESTOST SERPL-MCNC: 32 NG/DL (ref 9–55)

## 2022-12-13 ENCOUNTER — OFFICE VISIT (OUTPATIENT)
Dept: MEDICAL GROUP | Facility: PHYSICIAN GROUP | Age: 32
End: 2022-12-13
Payer: COMMERCIAL

## 2022-12-13 VITALS
WEIGHT: 211.6 LBS | RESPIRATION RATE: 18 BRPM | OXYGEN SATURATION: 95 % | SYSTOLIC BLOOD PRESSURE: 126 MMHG | HEIGHT: 60 IN | HEART RATE: 109 BPM | TEMPERATURE: 98.3 F | BODY MASS INDEX: 41.54 KG/M2 | DIASTOLIC BLOOD PRESSURE: 70 MMHG

## 2022-12-13 DIAGNOSIS — E78.2 MIXED HYPERLIPIDEMIA: ICD-10-CM

## 2022-12-13 DIAGNOSIS — N92.6 ABNORMAL MENSTRUAL PERIODS: ICD-10-CM

## 2022-12-13 DIAGNOSIS — R79.89 ELEVATED PROLACTIN LEVEL: ICD-10-CM

## 2022-12-13 PROCEDURE — 99213 OFFICE O/P EST LOW 20 MIN: CPT | Performed by: FAMILY MEDICINE

## 2022-12-13 ASSESSMENT — FIBROSIS 4 INDEX: FIB4 SCORE: 0.35

## 2022-12-13 NOTE — PROGRESS NOTES
Subjective:     CC:   Chief Complaint   Patient presents with    Follow-Up       HPI:   Elinor presents today the fact that her provider called out sick and she saw her lab work and she wanted discussed the prolactin level.  Patient apparently has a family history of polycystic ovarian disease she is worried about when she stops birth control if she can get pregnant or not.  Prior to be on birth control patient has had heavy periods but they occurred every month.  Patient is seeing her gynecologist every 3 years and it sounds like she is due for appointment coming up this coming year.  Patient states occasionally she has a little bit of nipple discharge is crusty material but nothing other than that.  Patient also feels like her left ear may have some earwax in it would like me to look at it.    Past Medical History:   Diagnosis Date    Allergy     food allergies: nuts and seafood, carries epipen    Anxiety     Asthma     childhood, no symptoms since age 8    Hypertension        Social History     Tobacco Use    Smoking status: Never    Smokeless tobacco: Never   Vaping Use    Vaping Use: Never used   Substance Use Topics    Alcohol use: Not Currently     Comment: once every 6 months    Drug use: No       Current Outpatient Medications Ordered in Epic   Medication Sig Dispense Refill    magnesium gluconate (MAG-G) 500 MG tablet Take 500 mg by mouth every day.      albuterol 108 (90 Base) MCG/ACT Aero Soln inhalation aerosol Inhale 2 Puffs every four hours as needed for Shortness of Breath. 1 Each 0    omeprazole (PRILOSEC) 20 MG delayed-release capsule Take 1 Capsule by mouth every day. 90 Capsule 3    norethindrone-ethinyl estradiol (JUNEL FE 1/20) 1-20 MG-MCG per tablet Take 1 Tablet by mouth every day. 84 Tablet 3    EPINEPHrine (EPIPEN INJ) 0.3 mg by Injection route as needed (anaphylaxis).      vitamin D (CHOLECALCIFEROL) 1000 UNIT Tab Take 5,000 Units by mouth every day.       No current Epic-ordered  facility-administered medications on file.       Allergies:  Hydromorphone, Iodine, Sugammadex, Tree nuts food allergy, Pumpkin seed, and Sunflower oil    Health Maintenance: Completed    ROS:  Gen: no fevers/chills, no changes in weight  Eyes: no changes in vision  Neuro: no headaches, no numbness/tingling  Heme/Lymph: no easy bruising    Objective:     Exam:  /70 (BP Location: Right arm, Patient Position: Sitting, BP Cuff Size: Adult)   Pulse (!) 109   Temp 36.8 °C (98.3 °F) (Temporal)   Resp 18   Ht 1.524 m (5')   Wt 96 kg (211 lb 9.6 oz)   SpO2 95%   BMI 41.33 kg/m²  Body mass index is 41.33 kg/m².    Gen: Alert and oriented, No apparent distress.  Skin: Warm and dry.  No obvious lesions.  Eyes: Sclera wnl Pupils normal in size  ENT: Canal on the left is clear to canal on the right there is a small amount of cerumen noted.  And TM are not red, there is a little bit of clear fluid behind both TMs  Musculoskeletal: Normal gait. No extremity cyanosis, clubbing, or edema.  Neuro: Oriented to person, place and time  Psych: Mood is wnl       Assessment & Plan:     32 y.o. female with the following -     1. Elevated prolactin level  Patient's prolactin level is just mildly elevated may need to consider repeating but I let patient know I will let Gus Espinoza her PCP know to see what she would like to do.  This is acute problem    2. Mixed hyperlipidemia  Patient's lipid panel is elevated 1 consideration is for her to be on a low-fat diet and try to repeat this again after the holiday season this is a chronic problem    3. Abnormal menstrual periods  Apparently the birth control right now is working well for her she is having nonheavy periods monthly.  Patient was wondering if she still can get pregnant when she stops the pill and she definitely can.  But then she was concerned if she has heavy periods again.  I would definitely recommend this conversation be with her gynecologist who she sees every 3  years and she will be due to see her gynecologist again this coming year.       Return if symptoms worsen or fail to improve.  Patient did ask me if she has earwax in her ear if she can just come in and come in for ear irrigation I did not inform patient if she ever has that happen she needs to make a follow-up appointment with one of us so we can look in her ears to see if the ear irrigation needs to be done or not.    Please note that this dictation was created using voice recognition software. I have made every reasonable attempt to correct obvious errors, but I expect that there are errors of grammar and possibly content that I did not discover before finalizing the note.

## 2022-12-15 ENCOUNTER — PATIENT MESSAGE (OUTPATIENT)
Dept: MEDICAL GROUP | Facility: PHYSICIAN GROUP | Age: 32
End: 2022-12-15
Payer: COMMERCIAL

## 2022-12-15 DIAGNOSIS — R79.89 ELEVATED PROLACTIN LEVEL: ICD-10-CM

## 2023-01-27 RX ORDER — OMEPRAZOLE 20 MG/1
20 CAPSULE, DELAYED RELEASE ORAL
Qty: 90 CAPSULE | Refills: 0 | Status: SHIPPED | OUTPATIENT
Start: 2023-01-27 | End: 2023-06-19 | Stop reason: SDUPTHER

## 2023-06-10 ENCOUNTER — HOSPITAL ENCOUNTER (OUTPATIENT)
Dept: LAB | Facility: MEDICAL CENTER | Age: 33
End: 2023-06-10
Payer: COMMERCIAL

## 2023-06-10 DIAGNOSIS — R79.89 ELEVATED PROLACTIN LEVEL: ICD-10-CM

## 2023-06-10 LAB — PROLACTIN SERPL-MCNC: 29 NG/ML (ref 2.8–26)

## 2023-06-10 PROCEDURE — 36415 COLL VENOUS BLD VENIPUNCTURE: CPT

## 2023-06-10 PROCEDURE — 84146 ASSAY OF PROLACTIN: CPT

## 2023-06-19 ENCOUNTER — OFFICE VISIT (OUTPATIENT)
Dept: MEDICAL GROUP | Facility: PHYSICIAN GROUP | Age: 33
End: 2023-06-19
Payer: COMMERCIAL

## 2023-06-19 VITALS
SYSTOLIC BLOOD PRESSURE: 118 MMHG | OXYGEN SATURATION: 94 % | DIASTOLIC BLOOD PRESSURE: 76 MMHG | RESPIRATION RATE: 16 BRPM | HEIGHT: 60 IN | BODY MASS INDEX: 43.43 KG/M2 | TEMPERATURE: 98 F | WEIGHT: 221.2 LBS | HEART RATE: 99 BPM

## 2023-06-19 DIAGNOSIS — Z00.00 WELLNESS EXAMINATION: ICD-10-CM

## 2023-06-19 DIAGNOSIS — K21.9 GASTROESOPHAGEAL REFLUX DISEASE, UNSPECIFIED WHETHER ESOPHAGITIS PRESENT: ICD-10-CM

## 2023-06-19 DIAGNOSIS — E11.9 TYPE 2 DIABETES MELLITUS WITHOUT COMPLICATION, WITHOUT LONG-TERM CURRENT USE OF INSULIN (HCC): ICD-10-CM

## 2023-06-19 DIAGNOSIS — Z11.59 NEED FOR HEPATITIS C SCREENING TEST: ICD-10-CM

## 2023-06-19 DIAGNOSIS — Z11.4 SCREENING FOR HIV (HUMAN IMMUNODEFICIENCY VIRUS): ICD-10-CM

## 2023-06-19 PROCEDURE — 99395 PREV VISIT EST AGE 18-39: CPT

## 2023-06-19 PROCEDURE — 3078F DIAST BP <80 MM HG: CPT

## 2023-06-19 PROCEDURE — 3074F SYST BP LT 130 MM HG: CPT

## 2023-06-19 RX ORDER — OMEPRAZOLE 20 MG/1
20 CAPSULE, DELAYED RELEASE ORAL
Qty: 90 CAPSULE | Refills: 3 | Status: SHIPPED | OUTPATIENT
Start: 2023-06-19 | End: 2023-09-15 | Stop reason: SDUPTHER

## 2023-06-19 ASSESSMENT — FIBROSIS 4 INDEX: FIB4 SCORE: 0.36

## 2023-06-19 ASSESSMENT — PATIENT HEALTH QUESTIONNAIRE - PHQ9: CLINICAL INTERPRETATION OF PHQ2 SCORE: 0

## 2023-06-19 NOTE — PROGRESS NOTES
Subjective:     CC:   Chief Complaint   Patient presents with    Annual Exam       HPI:   Elinor Roe is a 33 y.o. female who presents for annual exam. She is feeling well and denies any complaints.    Ob-Gyn/ History:    Patient has GYN provider: yes - due for pap in August with [facility]   /Para:    Last Pap Smear:  . No history of abnormal pap smears.  Gyn Surgery:  None.  Current Contraceptive Method:  None. Is currently sexually active and trying for pregnancy.  Last menstrual period:  .  Periods regular. moderate bleeding. Cramping is moderate.   She do not take OTC analgesics for cramps.  No significant bloating/fluid retention, pelvic pain, or dyspareunia. No vaginal discharge  Post-menopausal bleeding: NA  Urinary incontinence: NA  Folate intake: Counseled     Health Maintenance  Advanced directive: NA   Osteoporosis Screen/ DEXA: NA   PT/vit D for falls prevention: NA   Cholesterol Screening: Discussed   Diabetes Screening: Up to date   Aspirin Use: NA      Anticipatory Guidance  Diet: Meal preps with chicken and rice and veggies, wraps with lunch. Dinner is usually a vegetable and protein. Tends to have bagels or muffins because they are quick and easy. Weekends will go out to eat or have pizza   Exercise: Started walking more, did a 5k in April, wanting to get back in to the gyn  Substance Abuse: None   Safe in relationship.   Seat belts, bike helmet, gun safety discussed.  Sun protection used.    Cancer screening  Colorectal Cancer Screening: Counseled - no family history of colon cancer    Lung Cancer Screening: NA    Cervical Cancer Screening: Due in 2023   Breast Cancer Screening: Counseled     Infectious disease screening/Immunizations  --STI Screening: Declines   --Practices safe sex.  --HIV Screening: Ordered   --Hepatitis C Screening: Ordered   --Immunizations:    Influenza: NA    HPV:  NA    Tetanus: Counseled    Shingles: n/a    Pneumococcal : Counseled,  declines     Other immunizations: None     She  has a past medical history of Allergy, Anxiety, Asthma, and Hypertension.  She  has a past surgical history that includes kamar by laparoscopy (12/13/2017).    Family History   Problem Relation Age of Onset    Hypertension Mother     Anxiety disorder Mother     Alcohol abuse Mother     Diabetes Father     Anxiety disorder Maternal Aunt     Drug abuse Maternal Aunt     Anxiety disorder Maternal Uncle        Social History     Socioeconomic History    Marital status:      Spouse name: Not on file    Number of children: Not on file    Years of education: Not on file    Highest education level: Not on file   Occupational History    Not on file   Tobacco Use    Smoking status: Never    Smokeless tobacco: Never   Vaping Use    Vaping Use: Never used   Substance and Sexual Activity    Alcohol use: Not Currently     Comment: once every 6 months    Drug use: No    Sexual activity: Yes     Partners: Male     Birth control/protection: Pill   Other Topics Concern    Not on file   Social History Narrative    Not on file     Social Determinants of Health     Financial Resource Strain: Not on file   Food Insecurity: Not on file   Transportation Needs: Not on file   Physical Activity: Not on file   Stress: Not on file   Social Connections: Not on file   Intimate Partner Violence: Not on file   Housing Stability: Not on file       Patient Active Problem List    Diagnosis Date Noted    Elevated prolactin level 12/13/2022    COVID 09/23/2022    Abnormal menstrual periods 09/23/2022    Morbid obesity with BMI of 40.0-44.9, adult ([facility]) 03/04/2022    Other sleep apnea 09/10/2021    Snoring 11/02/2020    Grieving 07/31/2020    Abnormal CBC 07/29/2020    Hyponatremia 07/29/2020    Type 2 diabetes mellitus without complication, without long-term current use of insulin ([facility]) 05/20/2019    Bilateral pes planus 05/20/2019    Gastroesophageal reflux disease 03/18/2019    Hyperlipidemia  05/31/2017    Situational phobia 10/21/2016    Vitamin D deficiency 10/14/2016    Anxiety 08/12/2016    Multiple food allergies 08/12/2016    Obesity (BMI 35.0-39.9 without comorbidity) 08/12/2016     Current Outpatient Medications   Medication Sig Dispense Refill    omeprazole (PRILOSEC) 20 MG delayed-release capsule Take 1 Capsule by mouth every day. 90 Capsule 3    magnesium gluconate (MAG-G) 500 MG tablet Take 500 mg by mouth every day.      albuterol 108 (90 Base) MCG/ACT Aero Soln inhalation aerosol Inhale 2 Puffs every four hours as needed for Shortness of Breath. 1 Each 0    EPINEPHrine (EPIPEN INJ) 0.3 mg by Injection route as needed (anaphylaxis).      vitamin D (CHOLECALCIFEROL) 1000 UNIT Tab Take 5,000 Units by mouth every day.       No current facility-administered medications for this visit.     Allergies   Allergen Reactions    Hydromorphone Anaphylaxis     Anaphylactic reaction at end of surgery 12/13/17. Will try to rule out Dilaudid vs. Sugammadex.     Iodine Anaphylaxis     Seafood allergy     Sugammadex Anaphylaxis     Anaphylaxitic reaction at end of surgery 12/13/17. Will try to rule out Dilaudid vs. Sugammadex.    Tree Nuts Food Allergy Anaphylaxis     Peanut allergy    Pumpkin Seed Anaphylaxis     Gum and lip swollem       Sunflower Oil        Review of Systems   Constitutional: Negative for fever, chills and malaise/fatigue.   HENT: Negative for congestion.    Eyes: Negative for pain.   Respiratory: Negative for cough and shortness of breath.    Cardiovascular: Negative for leg swelling.   Gastrointestinal: Negative for nausea, vomiting, abdominal pain and diarrhea.   Genitourinary: Negative for dysuria and hematuria.   Skin: Negative for rash.   Neurological: Negative for dizziness, focal weakness and headaches.   Endo/Heme/Allergies: Does not bruise/bleed easily.   Psychiatric/Behavioral: Negative for depression.  The patient is not nervous/anxious.      Objective:     /76   Pulse  99   Temp 36.7 °C (98 °F) (Temporal)   Resp 16   Ht 1.524 m (5')   Wt 100 kg (221 lb 3.2 oz)   LMP 06/07/2023   SpO2 94%   BMI 43.20 kg/m²   Body mass index is 43.2 kg/m².  Wt Readings from Last 4 Encounters:   06/19/23 100 kg (221 lb 3.2 oz)   12/13/22 96 kg (211 lb 9.6 oz)   09/23/22 93.9 kg (207 lb)   03/04/22 93.6 kg (206 lb 6 oz)       Physical Exam:  Constitutional: Well-developed and well-nourished. Not diaphoretic. No distress.   Skin: Skin is warm and dry. No rash noted.  Head: Atraumatic without lesions.  Eyes: Conjunctivae and extraocular motions are normal. Pupils are equal, round, and reactive to light. No scleral icterus.   Ears:  External ears unremarkable. Tympanic membranes clear and intact.  Nose: Nares patent. Septum midline. Turbinates without erythema nor edema. No discharge.   Mouth/Throat: Dentition is intact. Tongue normal. Oropharynx is clear and moist. Posterior pharynx without erythema or exudates.  Neck: Supple, trachea midline. Normal range of motion. No thyromegaly present. No lymphadenopathy--cervical or supraclavicular.  Cardiovascular: Regular rate and rhythm, S1 and S2 without murmur, rubs, or gallops.  Lungs: Normal inspiratory effort, CTA bilaterally, no wheezes/rhonchi/rales  Breast: Declines, will be getting pap next month   Abdomen: Soft, non tender, and without distention. Active bowel sounds in all four quadrants. No rebound, guarding, masses or HSM.  : Patient declines, will be getting pap next month.   Extremities: No cyanosis, clubbing, erythema, nor edema. Distal pulses intact and symmetric.   Monofilament testing with a 10 gram force: sensation intact: intact bilaterally  Visual Inspection: Feet without maceration, ulcers, fissures.  Pedal pulses: intact bilaterally    Musculoskeletal: All major joints AROM full in all directions without pain.   Neurological: Alert and oriented x 3. DTRs 2+/3 and symmetric. No cranial nerve deficit. 5/5 myotomes. Sensation  intact.   Psychiatric:  Behavior, mood, and affect are appropriate.    A chaperone was offered to the patient during today's exam. Patient declined chaperone.    Assessment and Plan:     1. Wellness examination  Comp Metabolic Panel    CBC WITHOUT DIFFERENTIAL      2. Type 2 diabetes mellitus without complication, without long-term current use of insulin (HCC)  Lipid Profile    TSH WITH REFLEX TO FT4    HEMOGLOBIN A1C    Diabetic Monofilament LE Exam      3. Screening for HIV (human immunodeficiency virus)  HIV AG/AB COMBO ASSAY SCREENING      4. Need for hepatitis C screening test  HEP C VIRUS ANTIBODY      5. Gastroesophageal reflux disease, unspecified whether esophagitis present  omeprazole (PRILOSEC) 20 MG delayed-release capsule          HCM:  Up to date    Labs per orders  Immunizations per orders  Patient counseled about skin care, diet, supplements, prenatal vitamins, safe sex and exercise.      Follow-up: Return in about 1 year (around 6/19/2024) for AWV.

## 2023-09-15 DIAGNOSIS — K21.9 GASTROESOPHAGEAL REFLUX DISEASE, UNSPECIFIED WHETHER ESOPHAGITIS PRESENT: ICD-10-CM

## 2023-09-20 RX ORDER — OMEPRAZOLE 20 MG/1
20 CAPSULE, DELAYED RELEASE ORAL
Qty: 90 CAPSULE | Refills: 3 | Status: SHIPPED | OUTPATIENT
Start: 2023-09-20 | End: 2023-09-21 | Stop reason: SDUPTHER

## 2023-09-21 DIAGNOSIS — K21.9 GASTROESOPHAGEAL REFLUX DISEASE, UNSPECIFIED WHETHER ESOPHAGITIS PRESENT: ICD-10-CM

## 2023-09-29 NOTE — TELEPHONE ENCOUNTER
Received request via: Patient    Was the patient seen in the last year in this department? Yes    Does the patient have an active prescription (recently filled or refills available) for medication(s) requested? No    Does the patient have [facility] Plus and need 100 day supply (blood pressure, diabetes and cholesterol meds only)? Patient does not have SCP    PA has been approved

## 2023-10-04 RX ORDER — OMEPRAZOLE 20 MG/1
20 CAPSULE, DELAYED RELEASE ORAL
Qty: 90 CAPSULE | Refills: 3 | Status: SHIPPED | OUTPATIENT
Start: 2023-10-04

## 2023-11-20 NOTE — PROGRESS NOTES
CC:Diagnoses of Gastroesophageal reflux disease, esophagitis presence not specified, Obesity (BMI 35.0-39.9 without comorbidity), and Anxiety were pertinent to this visit.    HISTORY OF PRESENT ILLNESS: Elinor Breaux is a 28 y.o. female previous patient of Adams Guerin who presents today to discuss the following problems:    Gastroesophageal reflux disease  This is a chronic problem for patient.  She was started on omeprazole in 2015, however she had only been taking her it 2-3 times weekly as needed after she ate a spicy meal.  This problem appears improved overall with the omeprazole however recently she reports that she has been woken up twice because of regurgitation.  Both of these episodes occurred after eating tacos.  Associated symptoms include burning in esophagus and coughing. She denies fevers, chills, nausea, emesis, hematemesis, hemoptysis, epigastric abdominal pain, loose stools, constipation, hematochezia, melena We have discussed lifestyle interventions including not eating late at night, not laying down after eating and raising the head of the bed.  We have also discussed taking omeprazole daily and she agrees to try this for the next 8 weeks and follow-up with me then.  .    Obesity (BMI 35.0-39.9 without comorbidity)  This is a chronic uncontrolled problem.  Pt reports she gained 50 lbs in grad school and admits that she may be lacking in her dietary control especially at lunchtime.  She does report that she has lost a few pounds recently through exercise at the gym and tries to eat healthy most meals.     Anxiety  This is a chronic problem for which she was seen first in 2016.  It is intermittent and she has a prescription of Xanax that she has not refilled since 2017.  She only takes it when she really needs it which usually is situational.  She reports a stable mood at this time.  Denies suicidal ideation and homicidal ideation.  I have reviewed the  and there are no signs of misuse.   She has filled out a controlled substance agreement today and performed urine drug screen.       Health Maintenance: Completed    Patient Active Problem List    Diagnosis Date Noted   • Gastroesophageal reflux disease 03/18/2019   • Hyperlipidemia 05/31/2017   • Panic disorder without agoraphobia 10/21/2016   • Situational phobia 10/21/2016   • Heartburn 10/14/2016   • Vitamin D deficiency 10/14/2016   • Multiple food allergies 08/12/2016   • Preventative health care 08/12/2016   • Obesity (BMI 35.0-39.9 without comorbidity) 08/12/2016        Allergies:Hydromorphone; Iodine; Sugammadex; Tree nuts food allergy; and Sunflower oil    Current Outpatient Prescriptions   Medication Sig Dispense Refill   • omeprazole (PRILOSEC) 20 MG delayed-release capsule Take 1 Cap by mouth every day. 90 Cap 3   • ALPRAZolam (XANAX) 0.5 MG Tab Take 1 Tab by mouth as needed for Sleep for up to 30 days. 30 Tab 0   • vitamin D (CHOLECALCIFEROL) 1000 UNIT Tab Take 5,000 Units by mouth every day.     • EPINEPHrine (EPIPEN INJ) 0.3 mg by Injection route as needed (anaphylaxis).     • Inulin (FIBER CHOICE FRUITY BITES) 1.5 g Chew Tab Take 1 Tab by mouth 1 time daily as needed.       No current facility-administered medications for this visit.        Social History   Substance Use Topics   • Smoking status: Never Smoker   • Smokeless tobacco: Former User     Quit date: 12/13/2011   • Alcohol use Yes      Comment: 2 times every other month     Social History     Social History Narrative   • No narrative on file       Family History   Problem Relation Age of Onset   • Hypertension Mother    • Anxiety disorder Mother    • Alcohol abuse Mother    • Diabetes Father    • Anxiety disorder Maternal Aunt    • Drug abuse Maternal Aunt    • Anxiety disorder Maternal Uncle        ROS:     Constitutional:  Negative for fever, chills, unexpected weight change, and fatigue/generalized weakness.  HEENT:  Negative for headaches, vision changes, hearing changes,  ear pain, ear discharge, rhinorrhea, sinus congestion, sore throat, and neck pain.    Respiratory: Negative for cough, sputum production, chest congestion, dyspnea, wheezing, and crackles.    Cardiovascular:Negative for chest pain, palpitations, orthopnea, and bilateral lower extremity edema.   Gastrointestinal: Positive for heartburn. Negative for  nausea, vomiting, abdominal pain, hematochezia, melena, diarrhea, constipation, and greasy/foul-smelling stools.   Genitourinary: Negative for dysuria, polyuria, hematuria, pyuria, urinary urgency, and urinary incontinence.   Musculoskeletal:Negative for myalgias, back pain, and joint pain.   Skin: Negative for rash, itching, cyanotic skin color change.   Neurological: Negative for dizziness, tingling, tremors, focal sensory deficit, focal weakness and headaches.   Endo/Heme/Allergies: Does not bruise/bleed easily.   Psychiatric/Behavioral:  Positive for Anxiety. Negative for depression, suicidal/homicidal ideation and memory loss.      - NOTE: All other systems reviewed and are negative, except as in HPI.      EXAM:   Blood pressure 122/70, pulse 88, temperature 36.9 °C (98.4 °F), temperature source Temporal, resp. rate 13, height 1.524 m (5'), weight 90.8 kg (200 lb 3.2 oz), last menstrual period 02/15/2019, SpO2 92 %, not currently breastfeeding. Body mass index is 39.1 kg/m².    Constitutional: Well-developed and well-nourished. Not diaphoretic. No distress.   Skin: Skin is warm and dry. No rash noted.  Head: Atraumatic without lesions.  Eyes: Conjunctivae and extraocular motions are normal. Pupils are equal, round, and reactive to light. No scleral icterus.   Ears:  External ears unremarkable. Tympanic membranes clear and intact.  Nose: Nares patent. Mucosa without edema or erythema. No discharge. No facial tenderness.  Mouth/Throat: Tongue normal. Oropharynx is clear and moist. Posterior pharynx without erythema or exudates.  Neck: Supple, trachea midline. No  thyromegaly present. No cervical or supraclavicular lymphadenopathy.  Cardiovascular: Regular rate and rhythm.   Chest: Effort normal. Clear to auscultation throughout. No adventitious sounds.   Abdomen: Soft, non tender, and without distention. Active bowel sounds in all four quadrants. No rebound, guarding, masses or hepatosplenomegaly.  Extremities: No cyanosis, clubbing, erythema, nor edema.   Neurological: Alert and oriented x 3. Sensation intact.   Psychiatric:  Behavior, mood, and affect are appropriate.       ASSESSMENT/PLAN:    1. Gastroesophageal reflux disease, esophagitis presence not specified  This is a chronic uncontrolled problem. Patient is doing well. No red flags. Continue PPI daily monitor.  Follow-up appointment scheduled for 8 weeks.  - omeprazole (PRILOSEC) 20 MG delayed-release capsule; Take 1 Cap by mouth every day.  Dispense: 90 Cap; Refill: 3    2. Obesity (BMI 35.0-39.9 without comorbidity)  Patient and I discussed the importance of lifestyle changes, with particular emphasis on decreasing sugar and carbohydrate intake and increasing plant-based nutrition (for the purposes of weight loss, general health, and prevention of chronic illnesses), as well as regular cardiovascular exercise, proper sleep, and stress management. Patient verbalized understanding.    - Patient identified as having weight management issue.  Appropriate orders and counseling given.    - CBC WITH DIFFERENTIAL; Future  - Lipid Profile; Future  - Comp Metabolic Panel; Future  - HEMOGLOBIN A1C; Future    3. Anxiety  Unstable.  Patient denies homicidal or suicidal ideation.  I have reviewed the  and patient has no signs of misuse or abuse.   In prescribing controlled substances to this patient, I certify that I have obtained and reviewed the medical history of Elinor Breaux. I have also made a good dewayne effort to obtain applicable records from other providers who have treated the patient and records did not  demonstrate any increased risk of substance abuse that would prevent me from prescribing controlled substances.     I have conducted a physical exam and documented it. I have reviewed Ms. Breaux’s prescription history as maintained by the [place] Prescription Monitoring Program.     Given the above, I believe the benefits of controlled substance therapy outweigh the risks.  Accordingly, I have discussed the risk and benefits, treatment plan, and alternative therapies with the patient.     - Controlled Substance Treatment Agreement  - Pain Management Screen; Future  - ALPRAZolam (XANAX) 0.5 MG Tab; Take 1 Tab by mouth as needed for Sleep for up to 30 days.  Dispense: 30 Tab; Refill: 0      Return in about 2 months (around 5/18/2019) for Routine Follow up.       Please note that this dictation was created using voice recognition software. I have made every reasonable attempt to correct obvious errors, but I expect that there are errors of grammar and possibly content that I did not discover before finalizing the note.   no

## 2024-01-26 ENCOUNTER — HOSPITAL ENCOUNTER (OUTPATIENT)
Dept: LAB | Facility: MEDICAL CENTER | Age: 34
End: 2024-01-26
Payer: COMMERCIAL

## 2024-01-26 LAB
ABO GROUP BLD: NORMAL
BASOPHILS # BLD AUTO: 0.4 % (ref 0–1.8)
BASOPHILS # BLD: 0.04 K/UL (ref 0–0.12)
BLD GP AB SCN SERPL QL: NORMAL
EOSINOPHIL # BLD AUTO: 0.28 K/UL (ref 0–0.51)
EOSINOPHIL NFR BLD: 2.8 % (ref 0–6.9)
ERYTHROCYTE [DISTWIDTH] IN BLOOD BY AUTOMATED COUNT: 43.3 FL (ref 35.9–50)
EST. AVERAGE GLUCOSE BLD GHB EST-MCNC: 131 MG/DL
GLUCOSE 1H P 50 G GLC PO SERPL-MCNC: 221 MG/DL (ref 70–139)
HBA1C MFR BLD: 6.2 % (ref 4–5.6)
HBV SURFACE AG SER QL: NORMAL
HCT VFR BLD AUTO: 37.9 % (ref 37–47)
HCV AB SER QL: NORMAL
HGB BLD-MCNC: 13.1 G/DL (ref 12–16)
HIV 1+2 AB+HIV1 P24 AG SERPL QL IA: NORMAL
IMM GRANULOCYTES # BLD AUTO: 0.03 K/UL (ref 0–0.11)
IMM GRANULOCYTES NFR BLD AUTO: 0.3 % (ref 0–0.9)
LYMPHOCYTES # BLD AUTO: 1.97 K/UL (ref 1–4.8)
LYMPHOCYTES NFR BLD: 19.6 % (ref 22–41)
MCH RBC QN AUTO: 29.6 PG (ref 27–33)
MCHC RBC AUTO-ENTMCNC: 34.6 G/DL (ref 32.2–35.5)
MCV RBC AUTO: 85.6 FL (ref 81.4–97.8)
MONOCYTES # BLD AUTO: 0.32 K/UL (ref 0–0.85)
MONOCYTES NFR BLD AUTO: 3.2 % (ref 0–13.4)
NEUTROPHILS # BLD AUTO: 7.41 K/UL (ref 1.82–7.42)
NEUTROPHILS NFR BLD: 73.7 % (ref 44–72)
NRBC # BLD AUTO: 0 K/UL
NRBC BLD-RTO: 0 /100 WBC (ref 0–0.2)
PLATELET # BLD AUTO: 305 K/UL (ref 164–446)
PMV BLD AUTO: 10.3 FL (ref 9–12.9)
RBC # BLD AUTO: 4.43 M/UL (ref 4.2–5.4)
RH BLD: NORMAL
RUBV AB SER QL: 146 IU/ML
T PALLIDUM AB SER QL IA: NORMAL
T4 FREE SERPL-MCNC: 1.07 NG/DL (ref 0.93–1.7)
TSH SERPL DL<=0.005 MIU/L-ACNC: 2.83 UIU/ML (ref 0.38–5.33)
WBC # BLD AUTO: 10.1 K/UL (ref 4.8–10.8)

## 2024-01-26 PROCEDURE — 86900 BLOOD TYPING SEROLOGIC ABO: CPT

## 2024-01-26 PROCEDURE — 84439 ASSAY OF FREE THYROXINE: CPT

## 2024-01-26 PROCEDURE — 82950 GLUCOSE TEST: CPT

## 2024-01-26 PROCEDURE — 86787 VARICELLA-ZOSTER ANTIBODY: CPT

## 2024-01-26 PROCEDURE — 87086 URINE CULTURE/COLONY COUNT: CPT

## 2024-01-26 PROCEDURE — 36415 COLL VENOUS BLD VENIPUNCTURE: CPT

## 2024-01-26 PROCEDURE — 86850 RBC ANTIBODY SCREEN: CPT

## 2024-01-26 PROCEDURE — 86803 HEPATITIS C AB TEST: CPT

## 2024-01-26 PROCEDURE — 86780 TREPONEMA PALLIDUM: CPT

## 2024-01-26 PROCEDURE — 87077 CULTURE AEROBIC IDENTIFY: CPT

## 2024-01-26 PROCEDURE — 86901 BLOOD TYPING SEROLOGIC RH(D): CPT

## 2024-01-26 PROCEDURE — 83036 HEMOGLOBIN GLYCOSYLATED A1C: CPT

## 2024-01-26 PROCEDURE — 84443 ASSAY THYROID STIM HORMONE: CPT

## 2024-01-26 PROCEDURE — 87340 HEPATITIS B SURFACE AG IA: CPT

## 2024-01-26 PROCEDURE — 85025 COMPLETE CBC W/AUTO DIFF WBC: CPT

## 2024-01-26 PROCEDURE — 86762 RUBELLA ANTIBODY: CPT

## 2024-01-26 PROCEDURE — 87389 HIV-1 AG W/HIV-1&-2 AB AG IA: CPT

## 2024-01-28 LAB
BACTERIA UR CULT: NORMAL
SIGNIFICANT IND 70042: NORMAL
SITE SITE: NORMAL
SOURCE SOURCE: NORMAL

## 2024-01-29 ENCOUNTER — OFFICE VISIT (OUTPATIENT)
Dept: MATERNAL FETAL MEDICINE | Facility: MEDICAL CENTER | Age: 34
End: 2024-01-29
Payer: COMMERCIAL

## 2024-01-29 DIAGNOSIS — O09.512 ADVANCED MATERNAL AGE, 1ST PREGNANCY, SECOND TRIMESTER: ICD-10-CM

## 2024-01-29 DIAGNOSIS — O28.5 ABNORMAL CHROMOSOMAL AND GENETIC FINDING ON ANTENATAL SCREENING MOTHER: ICD-10-CM

## 2024-01-29 DIAGNOSIS — Z3A.16 16 WEEKS GESTATION OF PREGNANCY: ICD-10-CM

## 2024-01-29 PROCEDURE — 99203 OFFICE O/P NEW LOW 30 MIN: CPT | Performed by: OBSTETRICS & GYNECOLOGY

## 2024-01-30 LAB
VZV IGG SER IA-ACNC: 289.1 IV
VZV IGM SER IA-ACNC: 0.29 ISR

## 2024-01-30 NOTE — PROGRESS NOTES
This is a 33 yrs year old  Ab0 referred for consultation regarding maternal age 34 at EDC and abnormal NIPT with atypical finding on the Y chromosome.      PMHx:   Denies hx of asthma, seizures, hypertension, diabetes, CV, respiratory, GI,  or endocrine disorders.       OPERATIONS: Cholecystectomy      TRANSFUSIONS:  Denies       ALLERGIES:  SUGAMMADEX: DECREASE IN OXYGEN SATURATION      HABITS: None       MEDS: Omeprazole       VD:  Denies hx of GC, syphilis, chlamydia, HPV or herpes       FHX:  Negative for birth defects or inheritable diseases for 3 generations.   Pedigree obtained.      DISCUSSION:   ANEUPLOIDY BACKGROUND INFORMATION:  I explained chromosomes, nondisjunction, and the characteristics of the most common trisomies including 21, 18 and 13.  We also discussed the variable nature of Down syndrome and the high mortality of trisomy 18 and 13.  I explained the chance for nondisjunction to occur increases with maternal age and is not usually associated with anything that occurs during or before at the pregnancy or the family history.    AGE RELATED RISK:  For a woman who will be 34 at the time of delivery, there is a 1 in 200 midtrimester risk for Down syndrome and 1 in 111 chance of any type of chromosome abnormality.    NON INVASIVE PRENATAL SCREENING  Noninvasive prenatal screening also known as NIPT, and IPS or cell free DNA is a noninvasive early prenatal test capable of determining the risk for fetal chromosome aneuploidies from a maternal blood sample by analyzing cell free fetal DNA.  The test is designed to screen for Down syndrome, trisomy 18 and trisomy 13.  Most platforms also screen for sex chromosome aneuploidy and or fetal sex via the presence or absence of the Y chromosome.  In general NIPT platforms show detection rates of greater than 99.2% for Down syndrome 96.3% for trisomy 18 and 91.7% for trisomy 13.  The detection rate for sex chromosome aneuploidies is condition and lab  specific.  The false positive rate is dependent on the patient's a priori risk.  Therefore, for any positive result, the sensitivity, specificity, and a priori risk should be used to calculate the positive predictive value for that patient.  Because there are case reports for both false positives and false negatives, the test detects at least 20% fewer chromosome abnormalities then diagnostic testing (karyotype), it is not a replacement for amniocentesis or CVS at this time.  Counseling with the option of ultrasound and amniocentesis is still the standard of care following a positive screening test result.  Screening for neural tube defects via MSAFP is recommended for any patient pursuing NIPT.  RESULT:  Atypical finding on the Y chromosome.    We discussed there are 3 possibilities, false positive, confined placental mosaicism, normal variation or mosaicism.    AMNIOCENTESIS:  Prenatal procedures including amniocentesis so the only way to make a definitive prenatal diagnosis of a chromosome condition.  In addition to fetal karyotype, the option of chromosomal MicroArray was discussed to test for microdeletions and micro duplications.  Chromosomal MicroArray detects significant copy number variants and in additional 1.7% of pregnancies.  Amniocentesis is available after the 15th week of the pregnancy and has an associated risk of miscarriage of approximately 1 in 900 procedures with some studies suggesting a rate of 1 in 1600 procedures.  Other complications from the amniocentesis can include spotting, cramping, leakage of fluid, and infection.  Amniocentesis has a 99.9% detection rate for chromosome abnormalities.  Amniocentesis can also detect 98 to 99% of spina bifida.    DETAILED ULTRASOUND:  The use of detailed ultrasound at 18-20 weeks can also be used for screening for features associated with chromosomal aneuploidy.  Detection is approximately 50% for Down syndrome, 90% for trisomy 18 and over 99% for  trisomy 13.  There is a 95% detection rate for spina bifida.  In addition a detailed ultrasound allows for full anatomy scan and can detect other types of isolated birth defects.    DECISION: Accepted amniocentesis.    ASSESSMENT:   Abnormal NIPT  Advanced maternal age  Accepted amniocentesis pending insurance coverage.    RECOMMENDATION:  Amniocentesis to be scheduled as soon as authorization received.    New outpatient office visit of low complexity MDM:  [id number]

## 2024-02-28 ENCOUNTER — ANCILLARY PROCEDURE (OUTPATIENT)
Dept: MATERNAL FETAL MEDICINE | Facility: MEDICAL CENTER | Age: 34
End: 2024-02-28
Attending: OBSTETRICS & GYNECOLOGY
Payer: COMMERCIAL

## 2024-02-28 VITALS
DIASTOLIC BLOOD PRESSURE: 98 MMHG | HEART RATE: 90 BPM | BODY MASS INDEX: 42.05 KG/M2 | SYSTOLIC BLOOD PRESSURE: 128 MMHG | WEIGHT: 215.3 LBS

## 2024-02-28 DIAGNOSIS — Z3A.20 20 WEEKS GESTATION OF PREGNANCY: ICD-10-CM

## 2024-02-28 DIAGNOSIS — O28.5 CHROMOSOMAL ANALYSIS ABNORMAL, FETAL, AFFECTING MOTHER, ANTEPARTUM: ICD-10-CM

## 2024-02-28 DIAGNOSIS — O09.512 SUPERVISION OF ELDERLY PRIMIGRAVIDA IN SECOND TRIMESTER: ICD-10-CM

## 2024-02-28 PROCEDURE — 76811 OB US DETAILED SNGL FETUS: CPT | Performed by: OBSTETRICS & GYNECOLOGY

## 2024-02-28 PROCEDURE — 59000 AMNIOCENTESIS DIAGNOSTIC: CPT | Performed by: OBSTETRICS & GYNECOLOGY

## 2024-02-28 PROCEDURE — 76817 TRANSVAGINAL US OBSTETRIC: CPT | Performed by: OBSTETRICS & GYNECOLOGY

## 2024-02-28 PROCEDURE — 76946 ECHO GUIDE FOR AMNIOCENTESIS: CPT | Performed by: OBSTETRICS & GYNECOLOGY

## 2024-02-28 ASSESSMENT — FIBROSIS 4 INDEX: FIB4 SCORE: 0.47

## 2024-03-20 ENCOUNTER — TELEPHONE (OUTPATIENT)
Dept: MATERNAL FETAL MEDICINE | Facility: MEDICAL CENTER | Age: 34
End: 2024-03-20
Payer: COMMERCIAL

## 2024-03-20 NOTE — TELEPHONE ENCOUNTER
Reviewed amniocentesis results with Dr. Goodwin, who advised all tests came back within normal limits. Relayed information to patient, who verbalized understanding and had no further questions at this time

## 2024-03-25 ENCOUNTER — ANCILLARY PROCEDURE (OUTPATIENT)
Dept: MATERNAL FETAL MEDICINE | Facility: MEDICAL CENTER | Age: 34
End: 2024-03-25
Attending: OBSTETRICS & GYNECOLOGY
Payer: COMMERCIAL

## 2024-03-25 VITALS
BODY MASS INDEX: 42.48 KG/M2 | SYSTOLIC BLOOD PRESSURE: 100 MMHG | DIASTOLIC BLOOD PRESSURE: 78 MMHG | WEIGHT: 217.5 LBS | HEART RATE: 78 BPM

## 2024-03-25 DIAGNOSIS — Z3A.24 24 WEEKS GESTATION OF PREGNANCY: ICD-10-CM

## 2024-03-25 DIAGNOSIS — O09.512 SUPERVISION OF ELDERLY PRIMIGRAVIDA IN SECOND TRIMESTER: ICD-10-CM

## 2024-03-25 DIAGNOSIS — O28.5 CHROMOSOMAL ANALYSIS ABNORMAL, FETAL, AFFECTING MOTHER, ANTEPARTUM: ICD-10-CM

## 2024-03-25 DIAGNOSIS — O24.414 INSULIN CONTROLLED GESTATIONAL DIABETES MELLITUS (GDM) IN SECOND TRIMESTER: ICD-10-CM

## 2024-03-25 PROCEDURE — 76815 OB US LIMITED FETUS(S): CPT | Performed by: OBSTETRICS & GYNECOLOGY

## 2024-03-25 PROCEDURE — 99213 OFFICE O/P EST LOW 20 MIN: CPT | Performed by: OBSTETRICS & GYNECOLOGY

## 2024-03-25 PROCEDURE — 76816 OB US FOLLOW-UP PER FETUS: CPT | Performed by: OBSTETRICS & GYNECOLOGY

## 2024-03-25 ASSESSMENT — FIBROSIS 4 INDEX: FIB4 SCORE: 0.47

## 2024-03-29 ENCOUNTER — TELEPHONE (OUTPATIENT)
Dept: MATERNAL FETAL MEDICINE | Facility: MEDICAL CENTER | Age: 34
End: 2024-03-29
Payer: COMMERCIAL

## 2024-04-05 ENCOUNTER — TELEPHONE (OUTPATIENT)
Dept: MATERNAL FETAL MEDICINE | Facility: MEDICAL CENTER | Age: 34
End: 2024-04-05
Payer: COMMERCIAL

## 2024-04-05 ENCOUNTER — HOSPITAL ENCOUNTER (OUTPATIENT)
Facility: MEDICAL CENTER | Age: 34
End: 2024-04-05
Payer: COMMERCIAL

## 2024-04-05 LAB
25(OH)D3 SERPL-MCNC: 56 NG/ML (ref 30–100)
BASOPHILS # BLD AUTO: 0.3 % (ref 0–1.8)
BASOPHILS # BLD: 0.03 K/UL (ref 0–0.12)
EOSINOPHIL # BLD AUTO: 0.14 K/UL (ref 0–0.51)
EOSINOPHIL NFR BLD: 1.2 % (ref 0–6.9)
ERYTHROCYTE [DISTWIDTH] IN BLOOD BY AUTOMATED COUNT: 43.4 FL (ref 35.9–50)
HCT VFR BLD AUTO: 35.7 % (ref 37–47)
HGB BLD-MCNC: 12.2 G/DL (ref 12–16)
IMM GRANULOCYTES # BLD AUTO: 0.05 K/UL (ref 0–0.11)
IMM GRANULOCYTES NFR BLD AUTO: 0.4 % (ref 0–0.9)
LYMPHOCYTES # BLD AUTO: 1.83 K/UL (ref 1–4.8)
LYMPHOCYTES NFR BLD: 16.1 % (ref 22–41)
MCH RBC QN AUTO: 29.6 PG (ref 27–33)
MCHC RBC AUTO-ENTMCNC: 34.2 G/DL (ref 32.2–35.5)
MCV RBC AUTO: 86.7 FL (ref 81.4–97.8)
MONOCYTES # BLD AUTO: 0.48 K/UL (ref 0–0.85)
MONOCYTES NFR BLD AUTO: 4.2 % (ref 0–13.4)
NEUTROPHILS # BLD AUTO: 8.84 K/UL (ref 1.82–7.42)
NEUTROPHILS NFR BLD: 77.8 % (ref 44–72)
NRBC # BLD AUTO: 0 K/UL
NRBC BLD-RTO: 0 /100 WBC (ref 0–0.2)
PLATELET # BLD AUTO: 325 K/UL (ref 164–446)
PMV BLD AUTO: 10.1 FL (ref 9–12.9)
RBC # BLD AUTO: 4.12 M/UL (ref 4.2–5.4)
T PALLIDUM AB SER QL IA: NORMAL
WBC # BLD AUTO: 11.4 K/UL (ref 4.8–10.8)

## 2024-04-05 PROCEDURE — 82306 VITAMIN D 25 HYDROXY: CPT

## 2024-04-05 PROCEDURE — 86780 TREPONEMA PALLIDUM: CPT

## 2024-04-05 PROCEDURE — 85025 COMPLETE CBC W/AUTO DIFF WBC: CPT

## 2024-04-05 NOTE — TELEPHONE ENCOUNTER
Patient sent in blood sugar log via [facility] and fax. Reviewed log with Dr. Goodwin, who advised for patient to increase night time Levemir dose from 14u to 18u, and also start taking her Levemir 10u with breakfast. Dr. Goodwin also advised for patient to monitor her sugars with these changes in place over the weekend, and update us again on Monday, for possible extra changes. LVM for patient to call back, as well as replied to [employer] message

## 2024-04-11 ENCOUNTER — TELEPHONE (OUTPATIENT)
Dept: MATERNAL FETAL MEDICINE | Facility: MEDICAL CENTER | Age: 34
End: 2024-04-11
Payer: COMMERCIAL

## 2024-04-11 NOTE — TELEPHONE ENCOUNTER
Called in new RX for Novolog/Humalog, whichever insurance covers; patient to take 8 units with breakfast and 10 units with dinner. Dispense 5 Kwikpens, refill x2.

## 2024-04-23 ENCOUNTER — TELEPHONE (OUTPATIENT)
Dept: MATERNAL FETAL MEDICINE | Facility: MEDICAL CENTER | Age: 34
End: 2024-04-23
Payer: COMMERCIAL

## 2024-04-23 NOTE — TELEPHONE ENCOUNTER
Fanny Moncada.B, Med Ass't    Call outcome: Spoke to patient regarding message below.    Message: Patient spoke to Edwige one of the medical assistants about her insulin, patient also sent over her blood sugar log, I replied to patient letting her know we received her blood sugar log. Informed patient that I will show  her log.

## 2024-04-24 ENCOUNTER — ANCILLARY PROCEDURE (OUTPATIENT)
Dept: MATERNAL FETAL MEDICINE | Facility: MEDICAL CENTER | Age: 34
End: 2024-04-24
Attending: OBSTETRICS & GYNECOLOGY
Payer: COMMERCIAL

## 2024-04-24 VITALS
BODY MASS INDEX: 42.81 KG/M2 | DIASTOLIC BLOOD PRESSURE: 73 MMHG | WEIGHT: 219.2 LBS | SYSTOLIC BLOOD PRESSURE: 114 MMHG | HEART RATE: 82 BPM

## 2024-04-24 DIAGNOSIS — O09.512 SUPERVISION OF ELDERLY PRIMIGRAVIDA IN SECOND TRIMESTER: ICD-10-CM

## 2024-04-24 DIAGNOSIS — Z3A.28 28 WEEKS GESTATION OF PREGNANCY: ICD-10-CM

## 2024-04-24 DIAGNOSIS — O40.3XX0 POLYHYDRAMNIOS IN THIRD TRIMESTER COMPLICATION, SINGLE OR UNSPECIFIED FETUS: ICD-10-CM

## 2024-04-24 DIAGNOSIS — O24.414 INSULIN CONTROLLED GESTATIONAL DIABETES MELLITUS (GDM) IN THIRD TRIMESTER: ICD-10-CM

## 2024-04-24 DIAGNOSIS — O28.5 CHROMOSOMAL ANALYSIS ABNORMAL, FETAL, AFFECTING MOTHER, ANTEPARTUM: ICD-10-CM

## 2024-04-24 PROCEDURE — 76816 OB US FOLLOW-UP PER FETUS: CPT | Performed by: OBSTETRICS & GYNECOLOGY

## 2024-04-24 ASSESSMENT — FIBROSIS 4 INDEX: FIB4 SCORE: 0.44

## 2024-04-29 ENCOUNTER — TELEPHONE (OUTPATIENT)
Dept: MATERNAL FETAL MEDICINE | Facility: MEDICAL CENTER | Age: 34
End: 2024-04-29
Payer: COMMERCIAL

## 2024-04-29 NOTE — TELEPHONE ENCOUNTER
Phone Number Called: [phone]     Call outcome:  Follow up    Message: Spoke to patients  in regards on how to document the patients blood sugar log, also let him know to inform the patient to update the office on Thursday for any changes on her log.    -Fanny [email or web address].[email or web address], Med Ass't

## 2024-04-30 ENCOUNTER — PATIENT MESSAGE (OUTPATIENT)
Dept: MATERNAL FETAL MEDICINE | Facility: MEDICAL CENTER | Age: 34
End: 2024-04-30
Payer: COMMERCIAL

## 2024-04-30 NOTE — TELEPHONE ENCOUNTER
Phone Number Called: [phone]     Call outcome: Spoke to patient regarding message below.    Message: Tried calling pharmacy but they are temporally closed until Monday, asked patient which other pharmacy she would like and she requested the [facility] on  [address] [place] [address] [address], [place] [address], I called in a prescription of 30 units of Levemir before bed and 14 units with breakfast w/ 5 pens. Pharmacy confirmed it would take about 2 hours for it to be ready. Confirmed with patient and she understood.    ~Fanny M, Med Ass't

## 2024-05-10 ENCOUNTER — PATIENT MESSAGE (OUTPATIENT)
Dept: MATERNAL FETAL MEDICINE | Facility: MEDICAL CENTER | Age: 34
End: 2024-05-10
Payer: COMMERCIAL

## 2024-05-14 ENCOUNTER — TELEPHONE (OUTPATIENT)
Dept: MATERNAL FETAL MEDICINE | Facility: MEDICAL CENTER | Age: 34
End: 2024-05-14
Payer: COMMERCIAL

## 2024-05-14 NOTE — TELEPHONE ENCOUNTER
Called and spoke with patient this morning.  Reason for call was to notify patient that her glucose log was reviewed by Dr. Goodwin.  Per Dr. Goodwin he would like the last few days of her glucose numbers.  Also he would like to know when there was a change to her dose of medication.  Patient stated she was driving and would call us back later this afternoon.

## 2024-05-15 ENCOUNTER — PATIENT MESSAGE (OUTPATIENT)
Dept: MATERNAL FETAL MEDICINE | Facility: MEDICAL CENTER | Age: 34
End: 2024-05-15
Payer: COMMERCIAL

## 2024-05-29 ENCOUNTER — ANCILLARY PROCEDURE (OUTPATIENT)
Dept: MATERNAL FETAL MEDICINE | Facility: MEDICAL CENTER | Age: 34
End: 2024-05-29
Attending: OBSTETRICS & GYNECOLOGY
Payer: COMMERCIAL

## 2024-05-29 VITALS
HEART RATE: 121 BPM | DIASTOLIC BLOOD PRESSURE: 89 MMHG | WEIGHT: 221.8 LBS | BODY MASS INDEX: 43.32 KG/M2 | SYSTOLIC BLOOD PRESSURE: 124 MMHG

## 2024-05-29 DIAGNOSIS — O09.512 SUPERVISION OF ELDERLY PRIMIGRAVIDA IN SECOND TRIMESTER: ICD-10-CM

## 2024-05-29 DIAGNOSIS — Z3A.33 33 WEEKS GESTATION OF PREGNANCY: ICD-10-CM

## 2024-05-29 DIAGNOSIS — O09.513 ADVANCED MATERNAL AGE, PRIMIGRAVIDA IN THIRD TRIMESTER, ANTEPARTUM: ICD-10-CM

## 2024-05-29 DIAGNOSIS — O36.63X0 EXCESSIVE FETAL GROWTH AFFECTING MANAGEMENT OF PREGNANCY IN THIRD TRIMESTER, SINGLE OR UNSPECIFIED FETUS: ICD-10-CM

## 2024-05-29 DIAGNOSIS — O24.414 INSULIN CONTROLLED GESTATIONAL DIABETES MELLITUS (GDM) IN THIRD TRIMESTER: ICD-10-CM

## 2024-05-29 DIAGNOSIS — O28.5 CHROMOSOMAL ANALYSIS ABNORMAL, FETAL, AFFECTING MOTHER, ANTEPARTUM: ICD-10-CM

## 2024-05-29 DIAGNOSIS — O36.63X0 MACROSOMIA AFFECTING MANAGEMENT OF MOTHER IN THIRD TRIMESTER, NOT APPLICABLE OR UNSPECIFIED FETUS: ICD-10-CM

## 2024-05-29 LAB
APPEARANCE UR: CLEAR
BILIRUB UR STRIP-MCNC: NEGATIVE MG/DL
COLOR UR AUTO: NORMAL
GLUCOSE UR STRIP.AUTO-MCNC: NEGATIVE MG/DL
KETONES UR STRIP.AUTO-MCNC: NEGATIVE MG/DL
LEUKOCYTE ESTERASE UR QL STRIP.AUTO: NEGATIVE
NITRITE UR QL STRIP.AUTO: NEGATIVE
PH UR STRIP.AUTO: 5.5 [PH] (ref 5–8)
PROT UR QL STRIP: NORMAL MG/DL
RBC UR QL AUTO: NEGATIVE
SP GR UR STRIP.AUTO: >=1.03
UROBILINOGEN UR STRIP-MCNC: 0.2 MG/DL

## 2024-05-29 ASSESSMENT — FIBROSIS 4 INDEX: FIB4 SCORE: 0.44

## 2024-05-30 ENCOUNTER — TELEPHONE (OUTPATIENT)
Dept: MATERNAL FETAL MEDICINE | Facility: MEDICAL CENTER | Age: 34
End: 2024-05-30
Payer: COMMERCIAL

## 2024-05-30 NOTE — TELEPHONE ENCOUNTER
Spoke with patient and let her know that  made a few changes to her insulin numbers, Dinner Humalog 22U, Levemir bedtime 50U but will have to spit it into two doses 25/25. Advised patient to give us a call on Monday to give us an update on her numbers.  -Fanny Ray, Med Ass't

## 2024-05-30 NOTE — TELEPHONE ENCOUNTER
VOICEMAIL  1. Caller Name: Fanny RayArnold Ass't                        Call Back Number: [phone]    2. Message: Tried calling patient to let her know that there were some changes to her insulin numbers, advised patient to give us a call back.    3. Patient approves office to leave a detailed voicemail/MyChart message: yes

## 2024-05-30 NOTE — TELEPHONE ENCOUNTER
Spoke with patient to get an update for her insulin numbers. Humalog 20U in the morning, Levemir 22U in the morning, dinner Humalog 20U, Levemir bedtime 46U.    -Fanny Ray,Arnold Ass't

## 2024-06-07 ENCOUNTER — NON-PROVIDER VISIT (OUTPATIENT)
Dept: MATERNAL FETAL MEDICINE | Facility: MEDICAL CENTER | Age: 34
End: 2024-06-07
Payer: COMMERCIAL

## 2024-06-07 VITALS
SYSTOLIC BLOOD PRESSURE: 128 MMHG | WEIGHT: 221 LBS | DIASTOLIC BLOOD PRESSURE: 86 MMHG | BODY MASS INDEX: 43.16 KG/M2 | HEART RATE: 93 BPM

## 2024-06-07 DIAGNOSIS — O24.414 INSULIN CONTROLLED GESTATIONAL DIABETES MELLITUS (GDM) IN THIRD TRIMESTER: Primary | ICD-10-CM

## 2024-06-07 DIAGNOSIS — O40.3XX0 POLYHYDRAMNIOS IN THIRD TRIMESTER COMPLICATION, SINGLE OR UNSPECIFIED FETUS: ICD-10-CM

## 2024-06-07 DIAGNOSIS — O28.5 CHROMOSOMAL ANALYSIS ABNORMAL, FETAL, AFFECTING MOTHER, ANTEPARTUM: ICD-10-CM

## 2024-06-07 ASSESSMENT — FIBROSIS 4 INDEX: FIB4 SCORE: 0.45

## 2024-06-10 NOTE — PROGRESS NOTES
Patient was being seen for NST only   Pending Prescriptions:                       Disp   Refills    nystatin (MYCOSTATIN) 607429 UNIT/GM exter*       0        Sig: APPLY TOPICALLY TWO TIMES A DAY AS NEEDED FOR SKIN           RASH    Routing refill request to provider for review/approval because:  Last ordered in the hospital

## 2024-06-17 ENCOUNTER — PATIENT MESSAGE (OUTPATIENT)
Dept: MATERNAL FETAL MEDICINE | Facility: MEDICAL CENTER | Age: 34
End: 2024-06-17
Payer: COMMERCIAL

## 2024-06-17 NOTE — PATIENT COMMUNICATION
Patient called stating that her insurance is now wanting her to switch to a 30 day supply for her insulin prescriptions. New RX called into [facility] pharmacy on [address], per pt request, for HumaLOG Kwikpens, 22u in the AM and 22u in the PM, as well as Levemir Kwikpens, 28u in the AM and 64u in the PM. 30 day supply for each, 0 refills. Pt was notified via [facility] message

## 2024-06-21 ENCOUNTER — NON-PROVIDER VISIT (OUTPATIENT)
Dept: MATERNAL FETAL MEDICINE | Facility: MEDICAL CENTER | Age: 34
End: 2024-06-21
Payer: COMMERCIAL

## 2024-06-21 ENCOUNTER — ANCILLARY PROCEDURE (OUTPATIENT)
Dept: MATERNAL FETAL MEDICINE | Facility: MEDICAL CENTER | Age: 34
End: 2024-06-21
Attending: OBSTETRICS & GYNECOLOGY
Payer: COMMERCIAL

## 2024-06-21 VITALS
WEIGHT: 222.6 LBS | SYSTOLIC BLOOD PRESSURE: 125 MMHG | HEART RATE: 100 BPM | DIASTOLIC BLOOD PRESSURE: 70 MMHG | BODY MASS INDEX: 43.47 KG/M2

## 2024-06-21 DIAGNOSIS — O09.523 ADVANCED MATERNAL AGE IN MULTIGRAVIDA, THIRD TRIMESTER: ICD-10-CM

## 2024-06-21 DIAGNOSIS — Z3A.37 37 WEEKS GESTATION OF PREGNANCY: ICD-10-CM

## 2024-06-21 DIAGNOSIS — O36.63X0 EXCESSIVE FETAL GROWTH AFFECTING MANAGEMENT OF PREGNANCY IN THIRD TRIMESTER, SINGLE OR UNSPECIFIED FETUS: ICD-10-CM

## 2024-06-21 DIAGNOSIS — O24.414 INSULIN CONTROLLED GESTATIONAL DIABETES MELLITUS (GDM) IN THIRD TRIMESTER: ICD-10-CM

## 2024-06-21 DIAGNOSIS — O40.3XX0 POLYHYDRAMNIOS IN THIRD TRIMESTER COMPLICATION, SINGLE OR UNSPECIFIED FETUS: ICD-10-CM

## 2024-06-21 DIAGNOSIS — O24.414 INSULIN CONTROLLED GESTATIONAL DIABETES MELLITUS (GDM) IN THIRD TRIMESTER: Primary | ICD-10-CM

## 2024-06-21 DIAGNOSIS — O09.93 HIGH RISK PREGNANCY CASE MANAGEMENT PATIENT IN THIRD TRIMESTER: ICD-10-CM

## 2024-06-21 LAB
APPEARANCE UR: ABNORMAL
BILIRUB UR STRIP-MCNC: NEGATIVE MG/DL
COLOR UR AUTO: YELLOW
GLUCOSE UR STRIP.AUTO-MCNC: NEGATIVE MG/DL
KETONES UR STRIP.AUTO-MCNC: NEGATIVE MG/DL
LEUKOCYTE ESTERASE UR QL STRIP.AUTO: NEGATIVE
NITRITE UR QL STRIP.AUTO: NEGATIVE
PH UR STRIP.AUTO: 6.5 [PH] (ref 5–8)
PROT UR QL STRIP: NEGATIVE MG/DL
RBC UR QL AUTO: NEGATIVE
SP GR UR STRIP.AUTO: 1.01
UROBILINOGEN UR STRIP-MCNC: 0.2 MG/DL

## 2024-06-21 ASSESSMENT — FIBROSIS 4 INDEX: FIB4 SCORE: 0.45

## 2024-06-22 DIAGNOSIS — K21.9 GASTROESOPHAGEAL REFLUX DISEASE, UNSPECIFIED WHETHER ESOPHAGITIS PRESENT: ICD-10-CM

## 2024-06-24 RX ORDER — OMEPRAZOLE 20 MG/1
20 CAPSULE, DELAYED RELEASE ORAL
Qty: 90 CAPSULE | Refills: 3 | Status: SHIPPED | OUTPATIENT
Start: 2024-06-24

## 2024-06-24 NOTE — TELEPHONE ENCOUNTER
Received request via: Pharmacy    Was the patient seen in the last year in this department? No    Does the patient have an active prescription (recently filled or refills available) for medication(s) requested? No    [facility]    Does the patient have custodial Plus and need 100 day supply (blood pressure, diabetes and cholesterol meds only)? Patient does not have SCP    Pt has appt scheduled but is about to have a baby.

## 2024-08-04 ENCOUNTER — OFFICE VISIT (OUTPATIENT)
Dept: URGENT CARE | Facility: PHYSICIAN GROUP | Age: 34
End: 2024-08-04
Payer: COMMERCIAL

## 2024-08-04 VITALS
HEIGHT: 65 IN | TEMPERATURE: 97.3 F | HEART RATE: 98 BPM | OXYGEN SATURATION: 97 % | BODY MASS INDEX: 33.7 KG/M2 | DIASTOLIC BLOOD PRESSURE: 60 MMHG | RESPIRATION RATE: 18 BRPM | WEIGHT: 202.3 LBS | SYSTOLIC BLOOD PRESSURE: 100 MMHG

## 2024-08-04 DIAGNOSIS — N61.0 MASTITIS: ICD-10-CM

## 2024-08-04 PROCEDURE — 99213 OFFICE O/P EST LOW 20 MIN: CPT | Performed by: NURSE PRACTITIONER

## 2024-08-04 PROCEDURE — 3078F DIAST BP <80 MM HG: CPT | Performed by: NURSE PRACTITIONER

## 2024-08-04 PROCEDURE — 3074F SYST BP LT 130 MM HG: CPT | Performed by: NURSE PRACTITIONER

## 2024-08-04 PROCEDURE — 1125F AMNT PAIN NOTED PAIN PRSNT: CPT | Performed by: NURSE PRACTITIONER

## 2024-08-04 RX ORDER — CEPHALEXIN 500 MG/1
500 CAPSULE ORAL 4 TIMES DAILY
Qty: 28 CAPSULE | Refills: 0 | Status: SHIPPED | OUTPATIENT
Start: 2024-08-04 | End: 2024-08-11

## 2024-08-04 ASSESSMENT — ENCOUNTER SYMPTOMS
SORE THROAT: 0
COUGH: 0
SHORTNESS OF BREATH: 0
CARDIOVASCULAR NEGATIVE: 1
RESPIRATORY NEGATIVE: 1
MYALGIAS: 1
ROS SKIN COMMENTS: PER HPI
CHILLS: 1

## 2024-08-04 ASSESSMENT — VISUAL ACUITY: OU: 1

## 2024-08-04 ASSESSMENT — PAIN SCALES - GENERAL: PAINLEVEL: 6=MODERATE PAIN

## 2024-08-04 ASSESSMENT — FIBROSIS 4 INDEX: FIB4 SCORE: 0.45

## 2024-08-04 NOTE — PROGRESS NOTES
Subjective:     Elinor Roe is a 34 y.o. female who presents for Breast Problem (Right breast mastitis )       Breast Problem  This is a new problem. Associated symptoms include chills and myalgias. Pertinent negatives include no congestion, coughing or sore throat.     Friday felt chills and body aches. Then noticed right breast pain, producing less milk, developing lateral redness, warmth and tenderness. Telehealth advised she be seen in person.    Currently 1 month postpartum. Pumps. Infant breastfeeds sparingly and only from the left breast. Hx chronic inverted right nipple.    Review of Systems   Constitutional:  Positive for chills.   HENT: Negative.  Negative for congestion and sore throat.    Respiratory: Negative.  Negative for cough and shortness of breath.    Cardiovascular: Negative.    Musculoskeletal:  Positive for myalgias.   Skin:         Per HPI   All other systems reviewed and are negative.    Refer to HPI for additional details.    During this visit, appropriate PPE was worn, and hand hygiene was performed.    PMH:  has a past medical history of Allergy, Anxiety, Asthma, and Hypertension.    MEDS:   Current Outpatient Medications:     cephALEXin (KEFLEX) 500 MG Cap, Take 1 Capsule by mouth 4 times a day for 7 days., Disp: 28 Capsule, Rfl: 0    omeprazole (PRILOSEC) 20 MG delayed-release capsule, TAKE 1 CAPSULE BY MOUTH EVERY DAY, Disp: 90 Capsule, Rfl: 3    magnesium gluconate (MAG-G) 500 MG tablet, Take 500 mg by mouth every day., Disp: , Rfl:     albuterol 108 (90 Base) MCG/ACT Aero Soln inhalation aerosol, Inhale 2 Puffs every four hours as needed for Shortness of Breath., Disp: 1 Each, Rfl: 0    EPINEPHrine (EPIPEN INJ), 0.3 mg by Injection route as needed (anaphylaxis)., Disp: , Rfl:     vitamin D (CHOLECALCIFEROL) 1000 UNIT Tab, Take 5,000 Units by mouth every day., Disp: , Rfl:     ALLERGIES:   Allergies   Allergen Reactions    Hydromorphone Anaphylaxis     Anaphylactic reaction at end  "of surgery 12/13/17. Will try to rule out Dilaudid vs. Sugammadex.     Iodine Anaphylaxis     Seafood allergy     Sugammadex Anaphylaxis     Anaphylaxitic reaction at end of surgery 12/13/17. Will try to rule out Dilaudid vs. Sugammadex.    Tree Nuts Food Allergy Anaphylaxis     Peanut allergy    Pumpkin Seed Anaphylaxis     Gum and lip swollem       Warren Oil      SURGHX:   Past Surgical History:   Procedure Laterality Date    ELZA BY LAPAROSCOPY  12/13/2017    Procedure: ELZA BY LAPAROSCOPY;  Surgeon: Jordi Peña M.D.;  [facility];  Service: General     SOCHX:  reports that she has never smoked. She has never used smokeless tobacco. She reports that she does not currently use alcohol. She reports that she does not use drugs.    FH: Per HPI as applicable/pertinent.      Objective:     /60 (BP Location: Right arm, Patient Position: Sitting, BP Cuff Size: Adult)   Pulse 98   Temp 36.3 °C (97.3 °F) (Temporal)   Resp 18   Ht 1.651 m (5' 5\")   Wt 91.8 kg (202 lb 4.8 oz)   LMP 06/07/2023   SpO2 97%   BMI 33.66 kg/m²     Physical Exam  Nursing note reviewed.   Constitutional:       General: She is not in acute distress.     Appearance: She is well-developed. She is not ill-appearing or toxic-appearing.   Eyes:      General: Vision grossly intact.   Cardiovascular:      Rate and Rhythm: Normal rate.   Pulmonary:      Effort: Pulmonary effort is normal. No respiratory distress.   Chest:   Breasts:     Right: Swelling, nipple discharge (Small drips of milk) and skin change present.      Left: Nipple discharge (Small drips of milk) present. No swelling.          Comments: Erythema, induration, warmth  Musculoskeletal:         General: No deformity. Normal range of motion.   Skin:     General: Skin is warm and dry.      Coloration: Skin is not pale.   Neurological:      Mental Status: She is alert and oriented to person, place, and time.      Motor: No weakness.   Psychiatric:    "      Behavior: Behavior normal. Behavior is cooperative.       Assessment/Plan:     1. Mastitis  - cephALEXin (KEFLEX) 500 MG Cap; Take 1 Capsule by mouth 4 times a day for 7 days.  Dispense: 28 Capsule; Refill: 0    Rx as above sent electronically. Continue warm compress. May use cool compress PRN. Ibuprofen PRN for pain. Continue breastfeeding/pumping.    Monitor. Follow up in 3 days if symptoms do not improve or sooner if symptoms change or worsen. Otherwise, complete antibiotic.    Differential diagnosis, natural history, supportive care, over-the-counter symptom management per 's instructions, close monitoring, and indications for immediate follow-up discussed.     All questions answered. Patient agrees with the plan of care.    Discharge summary provided via [facility].

## 2024-08-28 ENCOUNTER — APPOINTMENT (OUTPATIENT)
Dept: MEDICAL GROUP | Facility: PHYSICIAN GROUP | Age: 34
End: 2024-08-28
Payer: COMMERCIAL

## 2024-08-28 VITALS
TEMPERATURE: 97.7 F | WEIGHT: 201.4 LBS | HEIGHT: 60 IN | OXYGEN SATURATION: 98 % | HEART RATE: 102 BPM | DIASTOLIC BLOOD PRESSURE: 76 MMHG | BODY MASS INDEX: 39.54 KG/M2 | SYSTOLIC BLOOD PRESSURE: 116 MMHG

## 2024-08-28 DIAGNOSIS — F41.8 POSTPARTUM ANXIETY: ICD-10-CM

## 2024-08-28 DIAGNOSIS — R14.0 GASSINESS: ICD-10-CM

## 2024-08-28 DIAGNOSIS — T78.02XS ANAPHYLACTIC SHOCK DUE TO SHELLFISH, SEQUELA: ICD-10-CM

## 2024-08-28 DIAGNOSIS — E11.9 TYPE 2 DIABETES MELLITUS WITHOUT COMPLICATION, WITHOUT LONG-TERM CURRENT USE OF INSULIN (HCC): ICD-10-CM

## 2024-08-28 DIAGNOSIS — K21.9 GASTROESOPHAGEAL REFLUX DISEASE, UNSPECIFIED WHETHER ESOPHAGITIS PRESENT: ICD-10-CM

## 2024-08-28 DIAGNOSIS — Z30.41 ENCOUNTER FOR SURVEILLANCE OF CONTRACEPTIVE PILLS: ICD-10-CM

## 2024-08-28 DIAGNOSIS — Z86.32 HX GESTATIONAL DIABETES: ICD-10-CM

## 2024-08-28 PROCEDURE — 3074F SYST BP LT 130 MM HG: CPT

## 2024-08-28 PROCEDURE — 99215 OFFICE O/P EST HI 40 MIN: CPT

## 2024-08-28 PROCEDURE — 3078F DIAST BP <80 MM HG: CPT

## 2024-08-28 RX ORDER — PROPRANOLOL HCL 10 MG
10 TABLET ORAL 3 TIMES DAILY PRN
Qty: 60 TABLET | Refills: 0 | Status: SHIPPED | OUTPATIENT
Start: 2024-08-28 | End: 2024-09-17

## 2024-08-28 RX ORDER — ACETAMINOPHEN AND CODEINE PHOSPHATE 120; 12 MG/5ML; MG/5ML
1 SOLUTION ORAL DAILY
Qty: 84 TABLET | Refills: 11 | Status: SHIPPED | OUTPATIENT
Start: 2024-08-28

## 2024-08-28 RX ORDER — SIMETHICONE 80 MG
80 TABLET,CHEWABLE ORAL EVERY 6 HOURS PRN
Qty: 30 TABLET | Refills: 3 | Status: SHIPPED | OUTPATIENT
Start: 2024-08-28

## 2024-08-28 RX ORDER — SUCRALFATE 1 G/1
1 TABLET ORAL
Qty: 120 TABLET | Refills: 3 | Status: SHIPPED | OUTPATIENT
Start: 2024-08-28

## 2024-08-28 RX ORDER — EPINEPHRINE 0.3 MG/.3ML
INJECTION SUBCUTANEOUS
Qty: 1 EACH | Refills: 0 | Status: SHIPPED | OUTPATIENT
Start: 2024-08-28

## 2024-08-28 ASSESSMENT — PATIENT HEALTH QUESTIONNAIRE - PHQ9: CLINICAL INTERPRETATION OF PHQ2 SCORE: 0

## 2024-08-28 ASSESSMENT — FIBROSIS 4 INDEX: FIB4 SCORE: 0.45

## 2024-08-28 NOTE — ASSESSMENT & PLAN NOTE
Chronic and worsening  Omeprazole since 2014, symptoms worse.   Breastfeeding    Plan: Tums 800mg daily and sucralfate 1gram QID prn. Continue PPI, discussed breastfeeding on PPI

## 2024-08-28 NOTE — PROGRESS NOTES
Subjective:     CC: No chief complaint on file.      HPI:   Elinor Roe is a 34 y.o. female who presents for annual exam. She is feeling well and denies any complaints.    Ob-Gyn/ History:    Patient has GYN provider: yes  /Para:  ***  Last Pap Smear:  ***. *** history of abnormal pap smears.  Gyn Surgery:  ***.  Current Contraceptive Method:  ***. *** currently sexually active.  Last menstrual period:  ***.  Periods regular. {Description; bleeding vaginal:91949} bleeding. Cramping is {MILD/MOD/SEVERE/VARIABLE:08083}.   She {DOES/DOES NOT:64042} take OTC analgesics for cramps.  No significant bloating/fluid retention, pelvic pain, or dyspareunia. No vaginal discharge  Post-menopausal bleeding: NA  Urinary incontinence: ***  Folate intake: *** {F childbearing age folate 0.4-0.8mg daily. USPSTF: A}    Health Maintenance  Advanced directive: NA   Osteoporosis Screen/ DEXA: NA   PT/vit D for falls prevention: NA   Cholesterol Screening: Ordered   Diabetes Screening: Ordered   Aspirin Use: NA      Anticipatory Guidance  Diet: *** {BMI ?25 diet & physical activity counseling, BMI ?30 offer or referral for intervention. USPSTF: B}  Exercise: *** {BMI ?25 diet & physical activity counseling, BMI ?30 offer or referral for intervention. USPSTF: B}  Substance Abuse: *** {counseling if alcohol misuse. USPSTF: B}  Safe in relationship. {F childbearing age intimate partner violence screening. USPSTF: B}  Seat belts, bike helmet, gun safety discussed.  Sun protection used.    Cancer screening  Colorectal Cancer Screening: ***  {50-75 yearly FIT testing, colonoscopy every 10 years, or flex sig every 5 years. USPSTF: A. 45-75 if /. PN Policy}  Lung Cancer Screening: ***  {55-80 yearly screen with 30 pack-years, currently smoke or quit <15 years. USPSTF: B}  Cervical Cancer Screening: *** {21-65 every 3-5 years if nl screening. USPSTF: A}  Breast Cancer Screening: *** {50-75 mammography  every 2 years. USPSTF: B.  For age 40-50: out of 1000 women, 1 with BC saved.  2 will be dx/tx that wouldn't have caused problems or needed tx. 67 with normal bx. 576 with false positive mammo}    Infectious disease screening/Immunizations  --STI Screening: *** {GC/CT F16-24 yearly, USPSTF: B/A}  --Practices safe sex.  --HIV Screening: *** {15-65 once. USPSTF: A}  --Hepatitis C Screening: *** {18-79 once. USPSTF: B}  --Immunizations:    Influenza: *** {yearly}   HPV:  *** {F18-26, M18-21, M to 26 may be vaccinated, 3 doses}   Tetanus: *** {every ten years with at least one tdap}   Shingles: n/a {once, >50, should call insurance or go through pharmacy}   Pneumococcal : NA                Other immunizations: *** {Consider meningococcal, hepatitis A, hepatitis B, HIB.}    She  has a past medical history of Allergy, Anxiety, Asthma, and Hypertension.  She  has a past surgical history that includes kamar by laparoscopy (12/13/2017).    Family History   Problem Relation Age of Onset    Hypertension Mother     Anxiety disorder Mother     Alcohol abuse Mother     Diabetes Father     Anxiety disorder Maternal Aunt     Drug abuse Maternal Aunt     Anxiety disorder Maternal Uncle        Social History     Socioeconomic History    Marital status:      Spouse name: Not on file    Number of children: Not on file    Years of education: Not on file    Highest education level: Not on file   Occupational History    Not on file   Tobacco Use    Smoking status: Never    Smokeless tobacco: Never   Vaping Use    Vaping status: Never Used   Substance and Sexual Activity    Alcohol use: Not Currently     Comment: once every 6 months    Drug use: No    Sexual activity: Yes     Partners: Male     Birth control/protection: Pill   Other Topics Concern    Not on file   Social History Narrative    Not on file     Social Determinants of Health     Financial Resource Strain: Not on file   Food Insecurity: Not on file   Transportation Needs:  Not on file   Physical Activity: Not on file   Stress: Not on file   Social Connections: Not on file   Intimate Partner Violence: Not on file   Housing Stability: Not on file       Patient Active Problem List    Diagnosis Date Noted    Elevated prolactin level 12/13/2022    COVID 09/23/2022    Abnormal menstrual periods 09/23/2022    Morbid obesity with BMI of 40.0-44.9, adult ([facility]ly [facility]) 03/04/2022    Other sleep apnea 09/10/2021    Snoring 11/02/2020    Grieving 07/31/2020    Abnormal CBC 07/29/2020    Hyponatremia 07/29/2020    Type 2 diabetes mellitus without complication, without long-term current use of insulin (Formerly [facility]) 05/20/2019    Bilateral pes planus 05/20/2019    Gastroesophageal reflux disease 03/18/2019    Hyperlipidemia 05/31/2017    Situational phobia 10/21/2016    Vitamin D deficiency 10/14/2016    Anxiety 08/12/2016    Multiple food allergies 08/12/2016    Obesity (BMI 35.0-39.9 without comorbidity) 08/12/2016         Current Outpatient Medications   Medication Sig Dispense Refill    omeprazole (PRILOSEC) 20 MG delayed-release capsule TAKE 1 CAPSULE BY MOUTH EVERY DAY 90 Capsule 3    magnesium gluconate (MAG-G) 500 MG tablet Take 500 mg by mouth every day.      albuterol 108 (90 Base) MCG/ACT Aero Soln inhalation aerosol Inhale 2 Puffs every four hours as needed for Shortness of Breath. 1 Each 0    EPINEPHrine (EPIPEN INJ) 0.3 mg by Injection route as needed (anaphylaxis).      vitamin D (CHOLECALCIFEROL) 1000 UNIT Tab Take 5,000 Units by mouth every day.       No current facility-administered medications for this visit.     Allergies   Allergen Reactions    Hydromorphone Anaphylaxis     Anaphylactic reaction at end of surgery 12/13/17. Will try to rule out Dilaudid vs. Sugammadex.     Iodine Anaphylaxis     Seafood allergy     Sugammadex Anaphylaxis     Anaphylaxitic reaction at end of surgery 12/13/17. Will try to rule out Dilaudid vs. Sugammadex.    Tree Nuts Food Allergy Anaphylaxis     Peanut allergy     Pumpkin Seed Anaphylaxis     Gum and lip swollem       Sunflower Oil        Review of Systems ***  Constitutional: Negative for fever, chills and malaise/fatigue.   HENT: Negative for congestion.    Eyes: Negative for pain.   Respiratory: Negative for cough and shortness of breath.    Cardiovascular: Negative for leg swelling.   Gastrointestinal: Negative for nausea, vomiting, abdominal pain and diarrhea.   Genitourinary: Negative for dysuria and hematuria.   Skin: Negative for rash.   Neurological: Negative for dizziness, focal weakness and headaches.   Endo/Heme/Allergies: Does not bruise/bleed easily.   Psychiatric/Behavioral: Negative for depression.  The patient is not nervous/anxious.      Objective:     LMP 06/07/2023   There is no height or weight on file to calculate BMI.  Wt Readings from Last 4 Encounters:   08/04/24 91.8 kg (202 lb 4.8 oz)   06/21/24 101 kg (222 lb 9.6 oz)   06/07/24 100 kg (221 lb)   05/29/24 101 kg (221 lb 12.8 oz)       Physical Exam:  Constitutional: Well-developed and well-nourished. Not diaphoretic. No distress.   Skin: Skin is warm and dry. No rash noted.  Head: Atraumatic without lesions.  Eyes: Conjunctivae and extraocular motions are normal. Pupils are equal, round, and reactive to light. No scleral icterus.   Ears:  External ears unremarkable. Tympanic membranes clear and intact.  Nose: Nares patent. Septum midline. Turbinates without erythema nor edema. No discharge.   Mouth/Throat: Dentition is ***. Tongue normal. Oropharynx is clear and moist. Posterior pharynx without erythema or exudates.  Neck: Supple, trachea midline. Normal range of motion. No thyromegaly present. No lymphadenopathy--cervical or supraclavicular.  Cardiovascular: Regular rate and rhythm, S1 and S2 without murmur, rubs, or gallops.  Lungs: Normal inspiratory effort, CTA bilaterally, no wheezes/rhonchi/rales  Breast: Breasts examined seated and supine. No skin changes, peau d'orange or nipple retraction.  No discharge. No axillary or supraclavicular adenopathy. No masses or nodularity palpable. ***  Abdomen: Soft, non tender, and without distention. Active bowel sounds in all four quadrants. No rebound, guarding, masses or HSM.  :Perineum and external genitalia normal without rash. Vagina with {GYN VAGINAL DISCHARGE:721} discharge. Cervix without visible lesions or discharge. Bimanual exam without adnexal masses or cervical motion tenderness.***  Extremities: No cyanosis, clubbing, erythema, nor edema. Distal pulses intact and symmetric.   Musculoskeletal: All major joints AROM full in all directions without pain.  Neurological: Alert and oriented x 3. DTRs 2+/3 and symmetric. No cranial nerve deficit. 5/5 myotomes. Sensation intact.   Psychiatric:  Behavior, mood, and affect are appropriate.    A chaperone was offered to the patient during today's exam. {CHAPERONE:04275}    Assessment and Plan:     Problem List Items Addressed This Visit    None      HCM:  Up to date   Labs per orders  Immunizations per orders  Patient counseled about skin care, diet, supplements, prenatal vitamins, safe sex and exercise.      Follow-up: No follow-ups on file.

## 2024-08-28 NOTE — ASSESSMENT & PLAN NOTE
Chronic and ongoing   Needed insulin during pregnancy, has not been on insulin since that time. Currently breastfeeding and supplementing with formula.     Not checking blood sugars consistently, No over symptoms of hyper or hypoglycemia. Was on 58 units of levemir QHS, 32 units in the morning, humalog with meals (22 units with breakfast and 22 units with dinner)      Plan: labs as below, may need to start with metformin again.

## 2024-08-28 NOTE — PROGRESS NOTES
CC:   Chief Complaint   Patient presents with    Annual Exam        HPI: Patient is a 34 y.o. female presenting to discuss the following:    Subjective & Assessment/Plan:    Problem List Items Addressed This Visit       Postpartum anxiety     New problem, hx prior anxiety but well controlled, worsened with postpartum anxiety causing intrusive thoughts. Discussed daily SSRI vs as needed medication, patient wishes for prn medication.    Plan: start propranolol. Consider SSRI if needed.          Relevant Medications    propranolol (INDERAL) 10 MG Tab    Other Relevant Orders    TSH WITH REFLEX TO FT4    Comp Metabolic Panel    Gastroesophageal reflux disease     Chronic and worsening  Omeprazole since 2014, symptoms worse.   Breastfeeding    Plan: Tums 800mg daily and sucralfate 1gram QID prn. Continue PPI, discussed breastfeeding on PPI            Relevant Medications    sucralfate (CARAFATE) 1 GM Tab    simethicone (MYLICON) 80 MG Chew Tab    Other Relevant Orders    CBC WITHOUT DIFFERENTIAL    Referral to Gastroenterology    Type 2 diabetes mellitus without complication, without long-term current use of insulin (HCC)     Chronic and ongoing   Needed insulin during pregnancy, has not been on insulin since that time. Currently breastfeeding and supplementing with formula.     Not checking blood sugars consistently, No over symptoms of hyper or hypoglycemia. Was on 58 units of levemir QHS, 32 units in the morning, humalog with meals (22 units with breakfast and 22 units with dinner)      Plan: labs as below, may need to start with metformin again.             Other Visit Diagnoses       Hx gestational diabetes        Relevant Orders    CBC WITHOUT DIFFERENTIAL    Lipid Profile    TSH WITH REFLEX TO FT4    Comp Metabolic Panel    HEMOGLOBIN A1C    MICROALBUMIN CREAT RATIO URINE    Anaphylactic shock due to shellfish, sequela        Relevant Medications    EPINEPHrine (EPIPEN) 0.3 MG/0.3ML Solution Auto-injector solution  for injection    Other Relevant Orders    Referral to Allergy    Gassiness        Relevant Medications    simethicone (MYLICON) 80 MG Chew Tab    Encounter for surveillance of contraceptive pills        Relevant Medications    norethindrone (MICRONOR) 0.35 MG tablet            Patient Active Problem List    Diagnosis Date Noted    Elevated prolactin level 12/13/2022    COVID 09/23/2022    Abnormal menstrual periods 09/23/2022    Morbid obesity with BMI of 40.0-44.9, adult ([facility]) 03/04/2022    Other sleep apnea 09/10/2021    Snoring 11/02/2020    Grieving 07/31/2020    Abnormal CBC 07/29/2020    Hyponatremia 07/29/2020    Type 2 diabetes mellitus without complication, without long-term current use of insulin ([facility]) 05/20/2019    Bilateral pes planus 05/20/2019    Gastroesophageal reflux disease 03/18/2019    Hyperlipidemia 05/31/2017    Situational phobia 10/21/2016    Vitamin D deficiency 10/14/2016    Postpartum anxiety 08/12/2016    Multiple food allergies 08/12/2016    Obesity (BMI 35.0-39.9 without comorbidity) 08/12/2016       Current Outpatient Medications   Medication Sig Dispense Refill    EPINEPHrine (EPIPEN) 0.3 MG/0.3ML Solution Auto-injector solution for injection Inject 0.3 mL into the thigh one time for 1 dose 1 Each 0    sucralfate (CARAFATE) 1 GM Tab Take 1 Tablet by mouth 4 Times a Day,Before Meals and at Bedtime. 120 Tablet 3    propranolol (INDERAL) 10 MG Tab Take 1 Tablet by mouth 3 times a day as needed (anxiety). 60 Tablet 0    norethindrone (MICRONOR) 0.35 MG tablet Take 1 Tablet by mouth every day. 84 Tablet 11    simethicone (MYLICON) 80 MG Chew Tab Chew 1 Tablet every 6 hours as needed for Flatulence. 30 Tablet 3    omeprazole (PRILOSEC) 20 MG delayed-release capsule TAKE 1 CAPSULE BY MOUTH EVERY DAY 90 Capsule 3    magnesium gluconate (MAG-G) 500 MG tablet Take 500 mg by mouth every day.      albuterol 108 (90 Base) MCG/ACT Aero Soln inhalation aerosol Inhale 2 Puffs every four hours as  needed for Shortness of Breath. 1 Each 0    EPINEPHrine (EPIPEN INJ) 0.3 mg by Injection route as needed (anaphylaxis).      vitamin D (CHOLECALCIFEROL) 1000 UNIT Tab Take 5,000 Units by mouth every day.       No current facility-administered medications for this visit.       Allergies as of 08/28/2024 - Reviewed 08/28/2024   Allergen Reaction Noted    Hydromorphone Anaphylaxis 12/13/2017    Iodine Anaphylaxis 02/21/2017    Sugammadex Anaphylaxis 12/13/2017    Tree nuts food allergy Anaphylaxis 08/12/2016    Pumpkin seed Anaphylaxis 09/10/2021    Sunflower oil  12/13/2017       Health Maintenance: Outstanding health maintenance items were ordered if applicable to patient including screening and preventative measures.     Review of Systems: Otherwise negative except for as stated above.      Objective:   /76   Pulse (!) 102   Temp 36.5 °C (97.7 °F) (Temporal)   Ht 1.524 m (5')   Wt 91.4 kg (201 lb 6.4 oz)   SpO2 98%  Body mass index is 39.33 kg/m².  Vital signs reviewed  Physical Exam  Physical Exam  Constitutional:       Appearance: Normal appearance.   Eyes:      Extraocular Movements: Extraocular movements intact.   Pulmonary:      Effort: Pulmonary effort is normal.   Neurological:      General: No focal deficit present.      Mental Status: She is alert and oriented to person, place, and time.   Psychiatric:         Mood and Affect: Mood normal.         Behavior: Behavior normal.       Follow-up: Return in about 4 weeks (around 9/25/2024) for lab follow up.    Please note that this dictation was created using voice recognition software. I have made every reasonable attempt to correct obvious errors, but I expect that there are errors of grammar and possibly content that I did not discover before finalizing the note.    Electronically signed by ROSLYN Licona on August 28, 2024    My total time spent caring for the patient on the day of the encounter was 41 minutes. This time was spent on record  review, exam, communication with the patient, and documentation of this encounter.  This does not include time spent on separately billable procedures/tests.

## 2024-09-06 NOTE — ASSESSMENT & PLAN NOTE
New problem, hx prior anxiety but well controlled, worsened with postpartum anxiety causing intrusive thoughts. Discussed daily SSRI vs as needed medication, patient wishes for prn medication.    Plan: start propranolol. Consider SSRI if needed.

## 2024-09-15 DIAGNOSIS — F41.8 POSTPARTUM ANXIETY: ICD-10-CM

## 2024-09-17 RX ORDER — PROPRANOLOL HCL 10 MG
10 TABLET ORAL 3 TIMES DAILY PRN
Qty: 60 TABLET | Refills: 0 | Status: SHIPPED | OUTPATIENT
Start: 2024-09-17

## 2024-09-17 NOTE — TELEPHONE ENCOUNTER
Received request via: Pharmacy    Was the patient seen in the last year in this department? Yes    Does the patient have an active prescription (recently filled or refills available) for medication(s) requested? No    [facility]    Does the patient have shelter Plus and need 100-day supply? (This applies to ALL medications) Patient does not have SCP

## 2024-09-21 ENCOUNTER — HOSPITAL ENCOUNTER (OUTPATIENT)
Dept: LAB | Facility: MEDICAL CENTER | Age: 34
End: 2024-09-21
Payer: COMMERCIAL

## 2024-09-21 DIAGNOSIS — K21.9 GASTROESOPHAGEAL REFLUX DISEASE, UNSPECIFIED WHETHER ESOPHAGITIS PRESENT: ICD-10-CM

## 2024-09-21 DIAGNOSIS — F41.8 POSTPARTUM ANXIETY: ICD-10-CM

## 2024-09-21 DIAGNOSIS — Z86.32 HX GESTATIONAL DIABETES: ICD-10-CM

## 2024-09-21 LAB
ERYTHROCYTE [DISTWIDTH] IN BLOOD BY AUTOMATED COUNT: 41.1 FL (ref 35.9–50)
EST. AVERAGE GLUCOSE BLD GHB EST-MCNC: 134 MG/DL
HBA1C MFR BLD: 6.3 % (ref 4–5.6)
HCT VFR BLD AUTO: 35.9 % (ref 37–47)
HGB BLD-MCNC: 10.9 G/DL (ref 12–16)
MCH RBC QN AUTO: 23.9 PG (ref 27–33)
MCHC RBC AUTO-ENTMCNC: 30.4 G/DL (ref 32.2–35.5)
MCV RBC AUTO: 78.6 FL (ref 81.4–97.8)
PLATELET # BLD AUTO: 417 K/UL (ref 164–446)
PMV BLD AUTO: 9.5 FL (ref 9–12.9)
RBC # BLD AUTO: 4.57 M/UL (ref 4.2–5.4)
WBC # BLD AUTO: 8.7 K/UL (ref 4.8–10.8)

## 2024-09-21 PROCEDURE — 36415 COLL VENOUS BLD VENIPUNCTURE: CPT

## 2024-09-21 PROCEDURE — 80053 COMPREHEN METABOLIC PANEL: CPT

## 2024-09-21 PROCEDURE — 84443 ASSAY THYROID STIM HORMONE: CPT

## 2024-09-21 PROCEDURE — 85027 COMPLETE CBC AUTOMATED: CPT

## 2024-09-21 PROCEDURE — 82043 UR ALBUMIN QUANTITATIVE: CPT

## 2024-09-21 PROCEDURE — 82570 ASSAY OF URINE CREATININE: CPT

## 2024-09-21 PROCEDURE — 80061 LIPID PANEL: CPT

## 2024-09-21 PROCEDURE — 83036 HEMOGLOBIN GLYCOSYLATED A1C: CPT

## 2024-09-22 LAB
ALBUMIN SERPL BCP-MCNC: 4.1 G/DL (ref 3.2–4.9)
ALBUMIN/GLOB SERPL: 1.2 G/DL
ALP SERPL-CCNC: 103 U/L (ref 30–99)
ALT SERPL-CCNC: 24 U/L (ref 2–50)
ANION GAP SERPL CALC-SCNC: 12 MMOL/L (ref 7–16)
AST SERPL-CCNC: 20 U/L (ref 12–45)
BILIRUB SERPL-MCNC: 0.2 MG/DL (ref 0.1–1.5)
BUN SERPL-MCNC: 15 MG/DL (ref 8–22)
CALCIUM ALBUM COR SERPL-MCNC: 9.3 MG/DL (ref 8.5–10.5)
CALCIUM SERPL-MCNC: 9.4 MG/DL (ref 8.5–10.5)
CHLORIDE SERPL-SCNC: 102 MMOL/L (ref 96–112)
CHOLEST SERPL-MCNC: 198 MG/DL (ref 100–199)
CO2 SERPL-SCNC: 23 MMOL/L (ref 20–33)
CREAT SERPL-MCNC: 0.52 MG/DL (ref 0.5–1.4)
CREAT UR-MCNC: 88.15 MG/DL
FASTING STATUS PATIENT QL REPORTED: NORMAL
GFR SERPLBLD CREATININE-BSD FMLA CKD-EPI: 123 ML/MIN/1.73 M 2
GLOBULIN SER CALC-MCNC: 3.3 G/DL (ref 1.9–3.5)
GLUCOSE SERPL-MCNC: 110 MG/DL (ref 65–99)
HDLC SERPL-MCNC: 45 MG/DL
LDLC SERPL CALC-MCNC: 118 MG/DL
MICROALBUMIN UR-MCNC: 2.2 MG/DL
MICROALBUMIN/CREAT UR: 25 MG/G (ref 0–30)
POTASSIUM SERPL-SCNC: 4.3 MMOL/L (ref 3.6–5.5)
PROT SERPL-MCNC: 7.4 G/DL (ref 6–8.2)
SODIUM SERPL-SCNC: 137 MMOL/L (ref 135–145)
TRIGL SERPL-MCNC: 176 MG/DL (ref 0–149)
TSH SERPL DL<=0.005 MIU/L-ACNC: 3.98 UIU/ML (ref 0.38–5.33)

## 2024-09-25 ENCOUNTER — OFFICE VISIT (OUTPATIENT)
Dept: MEDICAL GROUP | Facility: PHYSICIAN GROUP | Age: 34
End: 2024-09-25
Payer: COMMERCIAL

## 2024-09-25 VITALS
DIASTOLIC BLOOD PRESSURE: 86 MMHG | HEIGHT: 60 IN | RESPIRATION RATE: 8 BRPM | SYSTOLIC BLOOD PRESSURE: 110 MMHG | HEART RATE: 97 BPM | TEMPERATURE: 98.2 F | OXYGEN SATURATION: 99 % | WEIGHT: 204.4 LBS | BODY MASS INDEX: 40.13 KG/M2

## 2024-09-25 DIAGNOSIS — L91.8 SKIN TAG: ICD-10-CM

## 2024-09-25 DIAGNOSIS — Z23 NEED FOR VACCINATION: ICD-10-CM

## 2024-09-25 DIAGNOSIS — E11.9 TYPE 2 DIABETES MELLITUS WITHOUT COMPLICATION, WITHOUT LONG-TERM CURRENT USE OF INSULIN (HCC): ICD-10-CM

## 2024-09-25 DIAGNOSIS — D50.9 MICROCYTIC ANEMIA: ICD-10-CM

## 2024-09-25 PROCEDURE — 99214 OFFICE O/P EST MOD 30 MIN: CPT | Mod: 25

## 2024-09-25 PROCEDURE — 90656 IIV3 VACC NO PRSV 0.5 ML IM: CPT

## 2024-09-25 PROCEDURE — 90471 IMMUNIZATION ADMIN: CPT

## 2024-09-25 PROCEDURE — 11200 RMVL SKIN TAGS UP TO&INC 15: CPT

## 2024-09-25 ASSESSMENT — FIBROSIS 4 INDEX: FIB4 SCORE: 0.35

## 2024-09-25 NOTE — PROCEDURES
Skin Tag Removal    Date/Time: 9/25/2024 3:30 PM    Performed by: SHAY Giron  Authorized by: SHAY Giron    Number of Lesions: 1  Lesion 1:     Body area: upper extremity    Upper extremity location: L upper arm    Initial size (mm): 2    Final defect size (mm): 3    Malignancy: benign lesion      Destruction method: cryotherapy      Cryo area: 4mm    Cryo cycles: 3

## 2024-09-25 NOTE — PROGRESS NOTES
Verbal consent was acquired by the patient to use [id number] ambient listening note generation during this visit ***    Subjective:     HPI:   History of Present Illness          Assessment & Plan:     Problem List Items Addressed This Visit       Type 2 diabetes mellitus without complication, without long-term current use of insulin (HCC)    Relevant Orders    Diabetic Monofilament LE Exam (Completed)    HEMOGLOBIN A1C    CBC WITHOUT DIFFERENTIAL     Other Visit Diagnoses       Microcytic anemia        Relevant Orders    IRON/TOTAL IRON BIND    FERRITIN    IRON/TOTAL IRON BIND    Need for vaccination        Relevant Orders    INFLUENZA VACCINE QUAD INJ (PF)              1. Type 2 diabetes mellitus without complication, without long-term current use of insulin (HCC)  ***  - Diabetic Monofilament LE Exam  - HEMOGLOBIN A1C; Future  - CBC WITHOUT DIFFERENTIAL; Future    2. Microcytic anemia  ***  - IRON/TOTAL IRON BIND; Future  - FERRITIN; Future  - IRON/TOTAL IRON BIND; Future    3. Need for vaccination  ***  - INFLUENZA VACCINE QUAD INJ (PF)        Health Maintenance: Orders placed as applicable to patient     Objective:     Exam:  Objective:  Vitals:    09/25/24 1507   BP: 110/86   Pulse: 97   Resp: (!) 8   Temp: 36.8 °C (98.2 °F)   SpO2: 99%     Physical Exam  Physical Exam    Constitutional:       Appearance: Normal appearance.   Eyes:      Extraocular Movements: Extraocular movements intact.   Pulmonary:      Effort: Pulmonary effort is normal.   Neurological:      General: No focal deficit present.      Mental Status: She is alert and oriented to person, place, and time.   Psychiatric:         Mood and Affect: Mood normal.         Behavior: Behavior normal.       Return in about 3 months (around 12/25/2024).    Please note that this dictation was created using voice recognition software. I have made every reasonable attempt to correct obvious errors, but I expect that there are errors of grammar and possibly  content that I did not discover before finalizing the note.

## 2024-10-11 ENCOUNTER — HOSPITAL ENCOUNTER (OUTPATIENT)
Dept: LAB | Facility: MEDICAL CENTER | Age: 34
End: 2024-10-11
Attending: NURSE PRACTITIONER
Payer: COMMERCIAL

## 2024-10-11 PROCEDURE — 86008 ALLG SPEC IGE RECOMB EA: CPT

## 2024-10-11 PROCEDURE — 83520 IMMUNOASSAY QUANT NOS NONAB: CPT

## 2024-10-11 PROCEDURE — 86003 ALLG SPEC IGE CRUDE XTRC EA: CPT | Mod: 91

## 2024-10-11 PROCEDURE — 36415 COLL VENOUS BLD VENIPUNCTURE: CPT

## 2024-10-11 PROCEDURE — 82785 ASSAY OF IGE: CPT

## 2024-10-12 DIAGNOSIS — F41.8 POSTPARTUM ANXIETY: ICD-10-CM

## 2024-10-14 ENCOUNTER — APPOINTMENT (OUTPATIENT)
Dept: MEDICAL GROUP | Facility: PHYSICIAN GROUP | Age: 34
End: 2024-10-14
Payer: COMMERCIAL

## 2024-10-14 LAB
A ALTERNATA IGE QN: <0.1 KU/L
A FUMIGATUS IGE QN: <0.1 KU/L
ALLERGEN, ARA H 6 SEVERE PEANUT Q0585: 10.2 KU/L
ALMOND IGE QN: 0.12 KU/L
ANNOTATION COMMENT IMP: ABNORMAL
BARLEY IGE QN: 0.16 KU/L
BEEF IGE QN: <0.1 KU/L
BERMUDA GRASS IGE QN: <0.1 KU/L
BLUE MUSSEL IGE QN: <0.1 KU/L
BRAZIL NUT IGE QN: <0.1 KU/L
C SPHAEROSPERMUM IGE QN: <0.1 KU/L
CASHEW NUT IGE QN: 0.87 KU/L
CAT DANDER IGE QN: 0.11 KU/L
CHESTNUT IGE QN: <0.1 KU/L
CHICKEN FEATHER IGE QN: <0.1 KU/L
CHICKEN SERUM PROT IGE QN: <0.1 KU/L
CHOCOLATE IGE QN: <0.1 KU/L
CLAM IGE QN: <0.1 KU/L
CODFISH IGE QN: <0.1 KU/L
COMMON RAGWEED IGE QN: <0.1 KU/L
CORN IGE QN: 0.23 KU/L
COTTONWOOD IGE QN: <0.1 KU/L
COW HAIR+DANDER IGE QN: <0.1 KU/L
COW MILK IGE QN: 0.24 KU/L
CRAB IGE QN: 0.19 KU/L
D FARINAE IGE QN: 0.37 KU/L
D PTERONYSS IGE QN: 0.39 KU/L
DEPRECATED MISC ALLERGEN IGE RAST QL: ABNORMAL
DEPRECATED MISC ALLERGEN IGE RAST QL: NORMAL
DOG DANDER IGE QN: 0.6 KU/L
EGG WHITE IGE QN: 0.17 KU/L
EGG YOLK IGE QN: <0.1 KU/L
ENGL PLANTAIN IGE QN: <0.1 KU/L
GOOSE FEATHER IGE QN: <0.1 KU/L
GOOSEFOOT IGE QN: 0.12 KU/L
HALIBUT IGE QN: 0.14 KU/L
HAZELNUT IGE QN: 0.24 KU/L
HORSE HAIR+DANDER IGE QN: <0.1 KU/L
HOUSE DUST GREER IGE QN: 0.17 KU/L
IGE SERPL-ACNC: 143 KU/L
JOHNSON GRASS IGE QN: <0.1 KU/L
KENT BLUE GRASS IGE QN: <0.1 KU/L
LENTILS IGE QN: 0.46 KU/L
LOBSTER IGE QN: <0.1 KU/L
MACKEREL IGE QN: <0.1 KU/L
MESQUITE IGE QN: <0.1 KU/L
MOUSE EPITH IGE QN: <0.1 KU/L
MOUSE URINE PROT IGE QN: <0.1 KU/L
MT JUNIPER IGE QN: <0.1 KU/L
MUGWORT IGE QN: <0.1 KU/L
OAT IGE QN: 0.16 KU/L
OLIVE POLN IGE QN: <0.1 KU/L
OYSTER IGE QN: <0.1 KU/L
P NOTATUM IGE QN: <0.1 KU/L
PATHOLOGY STUDY: ABNORMAL
PEANUT (RARA H) 1 IGE QN: 3.9 KU/L
PEANUT (RARA H) 2 IGE QN: 17.5 KU/L
PEANUT (RARA H) 3 IGE QN: 2.34 KU/L
PEANUT (RARA H) 8 IGE QN: <0.1 KU/L
PEANUT (RARA H) 9 IGE QN: <0.1 KU/L
PEANUT IGE QN: 30.1 KU/L
PECAN/HICK NUT IGE QN: <0.1 KU/L
PER RYE GRASS IGE QN: <0.1 KU/L
PINE NUT IGE QN: <0.1 KU/L
PORK IGE QN: <0.1 KU/L
RICE IGE QN: 0.13 KU/L
RYE IGE QN: 0.11 KU/L
SALMON IGE QN: <0.1 KU/L
SALTWORT IGE QN: <0.1 KU/L
SCALLOP IGE QN: <0.1 KU/L
SESAME SEED IGE QN: 0.14 KU/L
SHRIMP IGE QN: 0.24 KU/L
SOYBEAN IGE QN: 0.31 KU/L
SUNFLOWER SEED IGE QN: 1.42 KU/L
TEST NAME 95000: ABNORMAL
TIMOTHY IGE QN: 0.18 KU/L
TROUT IGE QN: <0.1 KU/L
TUNA IGE QN: <0.1 KU/L
WALLEYE PIKE IGE QN: <0.1 KU/L
WALNUT IGE QN: 0.48 KU/L
WHEAT IGE QN: <0.1 KU/L
WHITE ELM IGE QN: 0.11 KU/L
WHITE OAK IGE QN: <0.1 KU/L
WHOLE EGG IGE QN: 0.16 KU/L

## 2024-10-14 RX ORDER — PROPRANOLOL HYDROCHLORIDE 10 MG/1
10 TABLET ORAL 3 TIMES DAILY PRN
Qty: 60 TABLET | Refills: 0 | Status: SHIPPED | OUTPATIENT
Start: 2024-10-14

## 2024-10-15 LAB — TRYPTASE SERPL-MCNC: 4.3 UG/L

## 2024-10-31 ENCOUNTER — PATIENT MESSAGE (OUTPATIENT)
Dept: MEDICAL GROUP | Facility: PHYSICIAN GROUP | Age: 34
End: 2024-10-31
Payer: COMMERCIAL

## 2024-10-31 DIAGNOSIS — R14.0 BLOATING: ICD-10-CM

## 2024-12-13 ENCOUNTER — HOSPITAL ENCOUNTER (OUTPATIENT)
Dept: RADIOLOGY | Facility: MEDICAL CENTER | Age: 34
End: 2024-12-13
Payer: COMMERCIAL

## 2024-12-13 DIAGNOSIS — R11.0 NAUSEA: ICD-10-CM

## 2024-12-13 DIAGNOSIS — K21.9 GASTROESOPHAGEAL REFLUX IN INFANTS: ICD-10-CM

## 2024-12-13 DIAGNOSIS — R10.13 DISTRESS, EPIGASTRIC: ICD-10-CM

## 2024-12-13 DIAGNOSIS — R14.0 BLOATING: ICD-10-CM

## 2024-12-13 PROCEDURE — 74220 X-RAY XM ESOPHAGUS 1CNTRST: CPT

## 2024-12-14 ENCOUNTER — HOSPITAL ENCOUNTER (OUTPATIENT)
Dept: LAB | Facility: MEDICAL CENTER | Age: 34
End: 2024-12-14
Payer: COMMERCIAL

## 2024-12-14 DIAGNOSIS — E11.9 TYPE 2 DIABETES MELLITUS WITHOUT COMPLICATION, WITHOUT LONG-TERM CURRENT USE OF INSULIN (HCC): ICD-10-CM

## 2024-12-14 DIAGNOSIS — D50.9 MICROCYTIC ANEMIA: ICD-10-CM

## 2024-12-14 LAB
ERYTHROCYTE [DISTWIDTH] IN BLOOD BY AUTOMATED COUNT: 44.7 FL (ref 35.9–50)
EST. AVERAGE GLUCOSE BLD GHB EST-MCNC: 131 MG/DL
FERRITIN SERPL-MCNC: 13.6 NG/ML (ref 10–291)
HBA1C MFR BLD: 6.2 % (ref 4–5.6)
HCT VFR BLD AUTO: 39.1 % (ref 37–47)
HGB BLD-MCNC: 11.9 G/DL (ref 12–16)
IRON SATN MFR SERPL: 7 % (ref 15–55)
IRON SERPL-MCNC: 27 UG/DL (ref 40–170)
MCH RBC QN AUTO: 22.8 PG (ref 27–33)
MCHC RBC AUTO-ENTMCNC: 30.4 G/DL (ref 32.2–35.5)
MCV RBC AUTO: 74.9 FL (ref 81.4–97.8)
PLATELET # BLD AUTO: 419 K/UL (ref 164–446)
PMV BLD AUTO: 9.7 FL (ref 9–12.9)
RBC # BLD AUTO: 5.22 M/UL (ref 4.2–5.4)
T4 FREE SERPL-MCNC: 1.34 NG/DL (ref 0.93–1.7)
TIBC SERPL-MCNC: 395 UG/DL (ref 250–450)
UIBC SERPL-MCNC: 368 UG/DL (ref 110–370)
WBC # BLD AUTO: 9.8 K/UL (ref 4.8–10.8)

## 2024-12-14 PROCEDURE — 83550 IRON BINDING TEST: CPT

## 2024-12-14 PROCEDURE — 85027 COMPLETE CBC AUTOMATED: CPT

## 2024-12-14 PROCEDURE — 36415 COLL VENOUS BLD VENIPUNCTURE: CPT

## 2024-12-14 PROCEDURE — 84439 ASSAY OF FREE THYROXINE: CPT

## 2024-12-14 PROCEDURE — 83036 HEMOGLOBIN GLYCOSYLATED A1C: CPT

## 2024-12-14 PROCEDURE — 82728 ASSAY OF FERRITIN: CPT

## 2024-12-14 PROCEDURE — 83540 ASSAY OF IRON: CPT

## 2024-12-19 ENCOUNTER — APPOINTMENT (OUTPATIENT)
Dept: MEDICAL GROUP | Facility: PHYSICIAN GROUP | Age: 34
End: 2024-12-19
Payer: COMMERCIAL

## 2024-12-23 ENCOUNTER — APPOINTMENT (OUTPATIENT)
Dept: URBAN - METROPOLITAN AREA CLINIC 6 | Facility: CLINIC | Age: 34
Setting detail: DERMATOLOGY
End: 2024-12-23

## 2024-12-23 ENCOUNTER — APPOINTMENT (OUTPATIENT)
Dept: MEDICAL GROUP | Facility: PHYSICIAN GROUP | Age: 34
End: 2024-12-23
Payer: COMMERCIAL

## 2024-12-23 DIAGNOSIS — B07.8 OTHER VIRAL WARTS: ICD-10-CM

## 2024-12-23 DIAGNOSIS — L21.8 OTHER SEBORRHEIC DERMATITIS: ICD-10-CM

## 2024-12-23 DIAGNOSIS — L91.8 OTHER HYPERTROPHIC DISORDERS OF THE SKIN: ICD-10-CM

## 2024-12-23 PROBLEM — D48.5 NEOPLASM OF UNCERTAIN BEHAVIOR OF SKIN: Status: ACTIVE | Noted: 2024-12-23

## 2024-12-23 PROCEDURE — 99203 OFFICE O/P NEW LOW 30 MIN: CPT | Mod: 25

## 2024-12-23 PROCEDURE — ? BIOPSY BY SHAVE METHOD

## 2024-12-23 PROCEDURE — ? COUNSELING

## 2024-12-23 PROCEDURE — ? ADDITIONAL NOTES

## 2024-12-23 PROCEDURE — ? PRESCRIPTION

## 2024-12-23 PROCEDURE — 11102 TANGNTL BX SKIN SINGLE LES: CPT

## 2024-12-23 RX ORDER — KETOCONAZOLE 20 MG/ML
SHAMPOO, SUSPENSION TOPICAL BIW
Qty: 120 | Refills: 11 | Status: ERX | COMMUNITY
Start: 2024-12-23

## 2024-12-23 RX ORDER — CLOBETASOL PROPIONATE 0.5 MG/ML
SOLUTION TOPICAL QHS
Qty: 50 | Refills: 3 | Status: ERX | COMMUNITY
Start: 2024-12-23

## 2024-12-23 RX ADMIN — KETOCONAZOLE: 20 SHAMPOO, SUSPENSION TOPICAL at 00:00

## 2024-12-23 RX ADMIN — CLOBETASOL PROPIONATE: 0.5 SOLUTION TOPICAL at 00:00

## 2024-12-23 ASSESSMENT — LOCATION DETAILED DESCRIPTION DERM
LOCATION DETAILED: RIGHT AXILLARY VAULT
LOCATION DETAILED: MID-FRONTAL SCALP
LOCATION DETAILED: LEFT AXILLARY VAULT
LOCATION DETAILED: LEFT DISTAL DORSAL MIDDLE FINGER

## 2024-12-23 ASSESSMENT — LOCATION SIMPLE DESCRIPTION DERM
LOCATION SIMPLE: RIGHT AXILLARY VAULT
LOCATION SIMPLE: LEFT MIDDLE FINGER
LOCATION SIMPLE: LEFT AXILLARY VAULT
LOCATION SIMPLE: ANTERIOR SCALP

## 2024-12-23 ASSESSMENT — LOCATION ZONE DERM
LOCATION ZONE: AXILLAE
LOCATION ZONE: FINGER
LOCATION ZONE: SCALP

## 2024-12-23 NOTE — PROCEDURE: BIOPSY BY SHAVE METHOD
Detail Level: Detailed
Depth Of Biopsy: dermis
Was A Bandage Applied: Yes
Size Of Lesion In Cm: 0
Biopsy Type: H and E
Biopsy Method: Personna blade
Anesthesia Type: 1% lidocaine without epinephrine
Anesthesia Volume In Cc: 1.5
Hemostasis: Pressure
Wound Care: Petrolatum
Dressing: bandage
Destruction After The Procedure: No
Type Of Destruction Used: Curettage
Curettage Text: The wound bed was treated with curettage after the biopsy was performed.
Cryotherapy Text: The wound bed was treated with cryotherapy after the biopsy was performed.
Electrodesiccation Text: The wound bed was treated with electrodesiccation after the biopsy was performed.
Electrodesiccation And Curettage Text: The wound bed was treated with electrodesiccation and curettage after the biopsy was performed.
Silver Nitrate Text: The wound bed was treated with silver nitrate after the biopsy was performed.
Lab: [id number]
Lab Facility: 
Medical Necessity Information: It is in your best interest to select a reason for this procedure from the list below. All of these items fulfill various CMS LCD requirements except the new and changing color options.
Consent: Verbal consent was obtained and risks were reviewed including but not limited to scarring, infection, bleeding, scabbing, incomplete removal, nerve damage and allergy to anesthesia.
Post-Care Instructions: I reviewed with the patient in detail post-care instructions. Patient is to keep the biopsy site dry overnight, and then apply bacitracin twice daily until healed. Patient may apply hydrogen peroxide soaks to remove any crusting.
Notification Instructions: Patient will be notified of biopsy results. However, patient instructed to call the office if not contacted within 2 weeks.
Billing Type: Third-Party Bill
Information: Selecting Yes will display possible errors in your note based on the variables you have selected. This validation is only offered as a suggestion for you. PLEASE NOTE THAT THE VALIDATION TEXT WILL BE REMOVED WHEN YOU FINALIZE YOUR NOTE. IF YOU WANT TO FAX A PRELIMINARY NOTE YOU WILL NEED TO TOGGLE THIS TO 'NO' IF YOU DO NOT WANT IT IN YOUR FAXED NOTE.

## 2024-12-23 NOTE — PROCEDURE: ADDITIONAL NOTES
Additional Notes: Treatment options including LN2, OTC topicals, Rx topicals and others discussed. She declines at this time
Render Risk Assessment In Note?: no
Detail Level: Simple
Additional Notes: Gave samples of Zoryve for eyebrows

## 2025-01-02 ENCOUNTER — OFFICE VISIT (OUTPATIENT)
Dept: MEDICAL GROUP | Facility: PHYSICIAN GROUP | Age: 35
End: 2025-01-02
Payer: COMMERCIAL

## 2025-01-02 VITALS
WEIGHT: 220.6 LBS | BODY MASS INDEX: 43.31 KG/M2 | HEART RATE: 111 BPM | RESPIRATION RATE: 17 BRPM | OXYGEN SATURATION: 97 % | TEMPERATURE: 98.4 F | HEIGHT: 60 IN | DIASTOLIC BLOOD PRESSURE: 82 MMHG | SYSTOLIC BLOOD PRESSURE: 118 MMHG

## 2025-01-02 DIAGNOSIS — Z30.09 COUNSELING FOR BIRTH CONTROL, ORAL CONTRACEPTIVES: ICD-10-CM

## 2025-01-02 DIAGNOSIS — D50.9 MICROCYTIC ANEMIA: ICD-10-CM

## 2025-01-02 DIAGNOSIS — G47.30 SEVERE SLEEP APNEA: ICD-10-CM

## 2025-01-02 DIAGNOSIS — E11.9 TYPE 2 DIABETES MELLITUS WITHOUT COMPLICATION, WITHOUT LONG-TERM CURRENT USE OF INSULIN (HCC): ICD-10-CM

## 2025-01-02 PROCEDURE — 99214 OFFICE O/P EST MOD 30 MIN: CPT

## 2025-01-02 PROCEDURE — 3079F DIAST BP 80-89 MM HG: CPT

## 2025-01-02 PROCEDURE — 3074F SYST BP LT 130 MM HG: CPT

## 2025-01-02 RX ORDER — NORETHINDRONE ACETATE AND ETHINYL ESTRADIOL 1.5-30(21)
1 KIT ORAL DAILY
Qty: 84 TABLET | Refills: 3 | Status: SHIPPED | OUTPATIENT
Start: 2025-01-02

## 2025-01-02 ASSESSMENT — PATIENT HEALTH QUESTIONNAIRE - PHQ9: CLINICAL INTERPRETATION OF PHQ2 SCORE: 0

## 2025-01-02 ASSESSMENT — FIBROSIS 4 INDEX: FIB4 SCORE: 0.35

## 2025-01-02 NOTE — PROGRESS NOTES
Verbal consent was acquired by the patient to use [employer] ambient listening note generation during this visit     Subjective:     HPI:   History of Present Illness  The patient is a 34-year-old female presenting to follow up on labs, iron deficiency, hiatal hernia, and sleep apnea.    She has been taking iron supplements once a week but reports inconsistent adherence to this regimen. She experiences constipation as a side effect of the iron supplements. She has previously used MiraLAX to manage this issue. She was referred to an allergist and it turns out she is not allergic to seafood, which she thought she was allergic to seafood like her whole life. She has reintroduced seafood into her diet recently and nothing has happened. She is wondering if starting to introduce seafood, fish, things like that may help with her iron deficiency. She plans to work on some things and even her sugar levels.    She has been on Tamara birth control due to breastfeeding but ceased breastfeeding in early December 2024. She is considering switching to another birth control method. She has previously used Ortho Tri-Cyclen Lo without any complications.    She has a hiatal hernia and a weak esophagus. She experiences occasional pain from the hernia, which she manages with frequent chiropractic adjustments. She takes sucralfate before bed if she feels discomfort after eating. She has not experienced any issues since starting this medication. She has undergone a barium swallow test and breath tests for H. pylori. She prefers to avoid surgery and is concerned about potential complications from future pregnancies.    She has been diagnosed with severe sleep apnea and has been recommended to use a CPAP machine. She has a history of snoring and suspects there may be an issue with her nose or throat. She reports feeling tired and attributes this to her anemia.    FAMILY HISTORY  Her father has sleep apnea.    ALLERGIES  She is allergic to  PEANUTS.    MEDICATIONS  Current: iron supplements, MiraLAX, Tamara birth control, sucralfate, omeprazole.  Past: Ortho Tri-Cyclen Lo.        Assessment & Plan:     Problem List Items Addressed This Visit       Type 2 diabetes mellitus without complication, without long-term current use of insulin (HCC)    Relevant Orders    HEMOGLOBIN A1C     Other Visit Diagnoses       Microcytic anemia        Relevant Medications    norethindrone-ethinyl estradiol-iron (LOESTRIN FE 1.5/30) 1.5-30 MG-MCG tablet    Other Relevant Orders    CBC WITHOUT DIFFERENTIAL    IRON/TOTAL IRON BIND    Counseling for birth control, oral contraceptives        Relevant Medications    norethindrone-ethinyl estradiol-iron (LOESTRIN FE 1.5/30) 1.5-30 MG-MCG tablet    Severe sleep apnea        Relevant Orders    Referral to ENT    Referral to Pulmonary and Sleep Medicine    DME CPAP              Assessment & Plan  1. Iron deficiency.  Chronic and ongoing   Her hemoglobin levels have shown improvement, albeit with a decrease in size. Her iron levels remain significantly low. She has been taking iron supplements inconsistently, approximately once a week, which has contributed to an increase in her hemoglobin levels. She is advised to take her iron supplements concurrently with MiraLAX to mitigate constipation. She is also encouraged to incorporate more seafood into her diet. Lab orders will be placed to reassess her condition in 3 months. If her iron levels do not improve, the next step could be an iron infusion.    2. Birth control management.  Chronic and ongoing   A prescription for Loestrin, which contains a low dose of iron, will be provided. [facility] with a few refills.    3. Hiatal hernia.  This is a chronic, stable medical condition.   Her hiatal hernia is relatively small in size. She is advised to continue her current regimen of omeprazole 20mg daily and sucralfate 1 gram QHS. The  potential risks associated with hiatal hernia repair were discussed, and it was agreed that surgical intervention would be deferred at this time.    4. Severe sleep apnea.  This is a chronic, uncontrolled medical condition   Her AHI is currently at 102, significantly above the desired level of below 5. A CPAP machine will be ordered, and a referral to ENT and pulmonology will be made. An overnight sleep titration study will also be scheduled. If the CPAP does not adequately control her symptoms, a BiPAP may be considered.      Health Maintenance: Orders placed as applicable to patient     Objective:     Exam:  Objective:  Vitals:    01/02/25 1114   BP: 118/82   Pulse: (!) 111   Resp: 17   Temp: 36.9 °C (98.4 °F)   SpO2: 97%     Physical Exam  Physical Exam    Constitutional:       Appearance: Normal appearance.   Eyes:      Extraocular Movements: Extraocular movements intact.   Pulmonary:      Effort: Pulmonary effort is normal.   Neurological:      General: No focal deficit present.      Mental Status: She is alert and oriented to person, place, and time.   Psychiatric:         Mood and Affect: Mood normal.         Behavior: Behavior normal.       Return in about 6 weeks (around 2/13/2025).    Please note that this dictation was created using voice recognition software. I have made every reasonable attempt to correct obvious errors, but I expect that there are errors of grammar and possibly content that I did not discover before finalizing the note.

## 2025-01-24 ENCOUNTER — APPOINTMENT (OUTPATIENT)
Dept: URBAN - METROPOLITAN AREA CLINIC 20 | Facility: CLINIC | Age: 35
Setting detail: DERMATOLOGY
End: 2025-01-24

## 2025-01-24 DIAGNOSIS — L91.8 OTHER HYPERTROPHIC DISORDERS OF THE SKIN: ICD-10-CM

## 2025-01-24 PROCEDURE — ? SKIN TAG REMOVAL (COSMETIC)

## 2025-01-24 ASSESSMENT — LOCATION SIMPLE DESCRIPTION DERM: LOCATION SIMPLE: RIGHT UPPER BACK

## 2025-01-24 ASSESSMENT — LOCATION DETAILED DESCRIPTION DERM: LOCATION DETAILED: RIGHT MEDIAL UPPER BACK

## 2025-01-24 ASSESSMENT — LOCATION ZONE DERM: LOCATION ZONE: TRUNK

## 2025-01-24 NOTE — PROCEDURE: SKIN TAG REMOVAL (COSMETIC)
Price (Use Numbers Only, No Special Characters Or $): 80
Removed With: scissors
Anesthesia Volume In Cc: 0.2
Consent: Written consent obtained and the risks of skin tag removal was reviewed with the patient including but not limited to bleeding, pigmentary change, infection, pain, and remote possibility of scarring.
Detail Level: Detailed
Hemostasis: Electrocautery
Anesthesia Type: 0.5% lidocaine without epinephrine

## 2025-03-18 NOTE — PROGRESS NOTES
Phone Number Called: [phone] (home)     Message: I returned a call to [facility] regarding the day supply for the prescription ALPRAZolam (XANAX) 0.5 MG Tab    Per Lorri it was for a 30 day supply.    Left Message for patient to call back: yes      
[[phone]]

## 2025-03-27 ENCOUNTER — TELEPHONE (OUTPATIENT)
Dept: HEALTH INFORMATION MANAGEMENT | Facility: OTHER | Age: 35
End: 2025-03-27
Payer: COMMERCIAL

## 2025-03-31 ENCOUNTER — HOSPITAL ENCOUNTER (OUTPATIENT)
Dept: LAB | Facility: MEDICAL CENTER | Age: 35
End: 2025-03-31
Payer: COMMERCIAL

## 2025-03-31 DIAGNOSIS — D50.9 MICROCYTIC ANEMIA: ICD-10-CM

## 2025-03-31 DIAGNOSIS — E11.9 TYPE 2 DIABETES MELLITUS WITHOUT COMPLICATION, WITHOUT LONG-TERM CURRENT USE OF INSULIN (HCC): ICD-10-CM

## 2025-03-31 LAB
ERYTHROCYTE [DISTWIDTH] IN BLOOD BY AUTOMATED COUNT: 46.5 FL (ref 35.9–50)
EST. AVERAGE GLUCOSE BLD GHB EST-MCNC: 171 MG/DL
HBA1C MFR BLD: 7.6 % (ref 4–5.6)
HCT VFR BLD AUTO: 42.6 % (ref 37–47)
HGB BLD-MCNC: 12.9 G/DL (ref 12–16)
IRON SATN MFR SERPL: 7 % (ref 15–55)
IRON SERPL-MCNC: 34 UG/DL (ref 40–170)
MCH RBC QN AUTO: 23.5 PG (ref 27–33)
MCHC RBC AUTO-ENTMCNC: 30.3 G/DL (ref 32.2–35.5)
MCV RBC AUTO: 77.6 FL (ref 81.4–97.8)
PLATELET # BLD AUTO: 503 K/UL (ref 164–446)
PMV BLD AUTO: 9.9 FL (ref 9–12.9)
RBC # BLD AUTO: 5.49 M/UL (ref 4.2–5.4)
TIBC SERPL-MCNC: 457 UG/DL (ref 250–450)
UIBC SERPL-MCNC: 423 UG/DL (ref 110–370)
WBC # BLD AUTO: 9.4 K/UL (ref 4.8–10.8)

## 2025-03-31 PROCEDURE — 85027 COMPLETE CBC AUTOMATED: CPT

## 2025-03-31 PROCEDURE — 83550 IRON BINDING TEST: CPT

## 2025-03-31 PROCEDURE — 83540 ASSAY OF IRON: CPT

## 2025-03-31 PROCEDURE — 36415 COLL VENOUS BLD VENIPUNCTURE: CPT

## 2025-03-31 PROCEDURE — 83036 HEMOGLOBIN GLYCOSYLATED A1C: CPT

## 2025-04-03 ENCOUNTER — APPOINTMENT (OUTPATIENT)
Dept: MEDICAL GROUP | Facility: PHYSICIAN GROUP | Age: 35
End: 2025-04-03
Payer: COMMERCIAL

## 2025-04-03 VITALS
DIASTOLIC BLOOD PRESSURE: 80 MMHG | HEIGHT: 60 IN | BODY MASS INDEX: 44.57 KG/M2 | WEIGHT: 227 LBS | OXYGEN SATURATION: 97 % | SYSTOLIC BLOOD PRESSURE: 122 MMHG | RESPIRATION RATE: 20 BRPM | HEART RATE: 110 BPM | TEMPERATURE: 98.3 F

## 2025-04-03 DIAGNOSIS — K21.9 GASTROESOPHAGEAL REFLUX DISEASE, UNSPECIFIED WHETHER ESOPHAGITIS PRESENT: ICD-10-CM

## 2025-04-03 DIAGNOSIS — E11.9 TYPE 2 DIABETES MELLITUS WITHOUT COMPLICATION, WITHOUT LONG-TERM CURRENT USE OF INSULIN (HCC): ICD-10-CM

## 2025-04-03 DIAGNOSIS — L91.8 SKIN TAG: ICD-10-CM

## 2025-04-03 DIAGNOSIS — J45.20 MILD INTERMITTENT ASTHMA WITHOUT COMPLICATION: ICD-10-CM

## 2025-04-03 PROCEDURE — 3074F SYST BP LT 130 MM HG: CPT

## 2025-04-03 PROCEDURE — 3079F DIAST BP 80-89 MM HG: CPT

## 2025-04-03 PROCEDURE — 11200 RMVL SKIN TAGS UP TO&INC 15: CPT

## 2025-04-03 PROCEDURE — 99214 OFFICE O/P EST MOD 30 MIN: CPT | Mod: 25

## 2025-04-03 RX ORDER — ALBUTEROL SULFATE 90 UG/1
2 INHALANT RESPIRATORY (INHALATION) EVERY 4 HOURS PRN
Qty: 1 EACH | Refills: 2 | Status: SHIPPED | OUTPATIENT
Start: 2025-04-03

## 2025-04-03 RX ORDER — METFORMIN HYDROCHLORIDE 500 MG/1
500 TABLET, EXTENDED RELEASE ORAL DAILY
Qty: 100 TABLET | Refills: 3 | Status: SHIPPED | OUTPATIENT
Start: 2025-04-03 | End: 2026-05-08

## 2025-04-03 RX ORDER — OMEPRAZOLE 20 MG/1
20 CAPSULE, DELAYED RELEASE ORAL
Qty: 90 CAPSULE | Refills: 3 | Status: SHIPPED | OUTPATIENT
Start: 2025-04-03

## 2025-04-03 ASSESSMENT — FIBROSIS 4 INDEX: FIB4 SCORE: 0.29

## 2025-04-03 NOTE — PROCEDURES
Skin Tag Removal    Date/Time: 4/3/2025 1:39 PM    Performed by: SHAY Giron  Authorized by: SHAY Giron    Number of Lesions: 2  Lesion 1:     Body area: head/neck    Head/neck location: neck    Initial size (mm): 2    Final defect size (mm): 2    Malignancy: benign lesion      Destruction method: cryotherapy      Cryo area: 3    Cryo cycles: 3

## 2025-04-03 NOTE — PROGRESS NOTES
Verbal consent was acquired by the patient to use [employer] ambient listening note generation during this visit     Subjective:     HPI:   History of Present Illness  The patient is a 34-year-old female presenting for review of laboratory results and medication refills for omeprazole and albuterol.    The patient reports inconsistent adherence to iron supplementation but notes improvement in her iron levels through the consumption of iron-rich foods such as seafood and beans. She no longer exhibits a seafood allergy, as confirmed by recent testing.    The patient expresses concern regarding elevated blood glucose levels and seeks pharmacological intervention to prevent the onset of diabetes mellitus. She attributes her lack of physical activity to the demands of caring for her 9-month-old child and the cold weather. Additionally, she reports poor sleep and dietary habits secondary to her responsibilities as a mother. She consumes soda for caffeine intake but has discontinued the use of energy drinks. During her pregnancy, she received education from a diabetic educator and remains motivated to improve her health for the benefit of her children. She aims to manage her hemoglobin A1c levels effectively. The patient has a family history of diabetes mellitus and a personal history of gestational diabetes, which was managed with insulin therapy rather than metformin.    She requests a refill of omeprazole, as her pharmacy has informed her that her current supply has been exhausted.    FAMILY HISTORY  She has a family history of diabetes.    ALLERGIES  The patient has no known allergies.    MEDICATIONS  Current: Omeprazole, albuterol        Assessment & Plan:     Problem List Items Addressed This Visit       Gastroesophageal reflux disease    Relevant Medications    omeprazole (PRILOSEC) 20 MG delayed-release capsule    Type 2 diabetes mellitus without complication, without long-term current use of insulin ([facility])     Relevant Medications    metFORMIN ER (GLUCOPHAGE XR) 500 MG TABLET SR 24 HR     Other Visit Diagnoses         Mild intermittent asthma without complication        Relevant Medications    albuterol 108 (90 Base) MCG/ACT Aero Soln inhalation aerosol      Skin tag        Relevant Orders    Skin Tag Removal              Assessment & Plan  1. DM II: Previously prediabetic but history of gestational diabetes. A1c has increased to from 6.2 to 7.6.  - Start metformin  mg once daily with food, may increase to 500 mg twice daily if tolerated.  - Consider alternative if intolerable side effects occur.  - Discussed potential use of GLP-1 agonists if metformin is not tolerated.    2. Iron deficiency: Iron levels improved from 27 to 34 but remain low.  - Continue iron supplements.  - Encourage consumption of iron-rich foods like beans and seafood.    3. Medication management.  - Refills for omeprazole and albuterol will be sent to [facility].    4. Skin tag: This is an acute, uncomplicated problem.   Removed with cryotherapy., see procedure note     Follow-up  - Follow-up in 1 month to assess tolerance and effectiveness of metformin.      Health Maintenance: Orders placed as applicable to patient     Objective:     Exam:  Objective:  Vitals:    04/03/25 1313   BP: 122/80   Pulse: (!) 110   Resp: 20   Temp: 36.8 °C (98.3 °F)   SpO2: 97%     Physical Exam  Physical Exam  Neck:      Comments: Skin tag noted left         Constitutional:       Appearance: Normal appearance.   Eyes:      Extraocular Movements: Extraocular movements intact.   Pulmonary:      Effort: Pulmonary effort is normal.   Neurological:      General: No focal deficit present.      Mental Status: She is alert and oriented to person, place, and time.   Psychiatric:         Mood and Affect: Mood normal.         Behavior: Behavior normal.       Return in about 4 weeks (around 5/1/2025).    Please note that this dictation was created using voice recognition  software. I have made every reasonable attempt to correct obvious errors, but I expect that there are errors of grammar and possibly content that I did not discover before finalizing the note.

## 2025-05-13 ENCOUNTER — APPOINTMENT (OUTPATIENT)
Dept: MEDICAL GROUP | Facility: PHYSICIAN GROUP | Age: 35
End: 2025-05-13
Payer: COMMERCIAL

## 2025-05-13 VITALS
OXYGEN SATURATION: 95 % | TEMPERATURE: 96.9 F | DIASTOLIC BLOOD PRESSURE: 88 MMHG | SYSTOLIC BLOOD PRESSURE: 138 MMHG | HEIGHT: 60 IN | BODY MASS INDEX: 45 KG/M2 | HEART RATE: 100 BPM | WEIGHT: 229.2 LBS

## 2025-05-13 DIAGNOSIS — E11.9 TYPE 2 DIABETES MELLITUS WITHOUT COMPLICATION, WITHOUT LONG-TERM CURRENT USE OF INSULIN (HCC): ICD-10-CM

## 2025-05-13 DIAGNOSIS — D50.9 MICROCYTIC ANEMIA: ICD-10-CM

## 2025-05-13 DIAGNOSIS — E55.9 VITAMIN D DEFICIENCY: ICD-10-CM

## 2025-05-13 PROCEDURE — 3079F DIAST BP 80-89 MM HG: CPT

## 2025-05-13 PROCEDURE — 3075F SYST BP GE 130 - 139MM HG: CPT

## 2025-05-13 PROCEDURE — 99213 OFFICE O/P EST LOW 20 MIN: CPT

## 2025-05-13 ASSESSMENT — FIBROSIS 4 INDEX: FIB4 SCORE: 0.29

## 2025-05-15 ENCOUNTER — APPOINTMENT (OUTPATIENT)
Dept: MEDICAL GROUP | Facility: PHYSICIAN GROUP | Age: 35
End: 2025-05-15
Payer: COMMERCIAL

## 2025-05-29 NOTE — PROGRESS NOTES
Verbal consent was acquired by the patient to use [employer] ambient listening note generation during this visit     Subjective:     HPI:   History of Present Illness  The patient, a 34-year-old female, presents for follow-up after initiating extended-release metformin at a dosage of 500 mg daily, prescribed in response to an increase in hemoglobin A1c from 6.2% to 7.6%.    She reports experiencing mild nausea on the first day of metformin administration, which subsequently resolved. She notes increased stool frequency, typically 1-2 bowel movements daily, occasionally increasing to 3-4 depending on dietary intake, without associated adverse symptoms. She adheres to taking the medication with lunch. The patient expresses interest in initiating injectable therapy but has concerns regarding insurance coverage. She requests laboratory orders for hemoglobin A1c monitoring.    Additionally, she mentions a persistent cutaneous papilloma (skin tag) that has become stiff and is frequently manipulated by her son. She believes a portion of the papilloma may have detached, potentially due to crystallization.        Assessment & Plan:     Problem List Items Addressed This Visit       Vitamin D deficiency    Relevant Orders    VITAMIN D,25 HYDROXY (DEFICIENCY)    Type 2 diabetes mellitus without complication, without long-term current use of insulin (HCC)    Relevant Orders    HEMOGLOBIN A1C    Comp Metabolic Panel    Lipid Profile     Other Visit Diagnoses         Microcytic anemia        Relevant Orders    IRON/TOTAL IRON BIND    CBC WITH DIFFERENTIAL              Assessment & Plan  1. Diabetes Mellitus: Chronic, not at goal.    - Mild nausea and increased bowel movements with metformin 500 mg daily, resolved. Reports feeling less bloated and more comfortable in clothing. Gained a couple of pounds, possibly water weight.  - Open to injectable therapy but concerned about insurance coverage.  - A1c test in 6 weeks to evaluate  treatment efficacy.  - Order comprehensive labs: iron levels, blood counts, vitamin D.    2. Skin Tag: This is an acute, uncomplicated problem.  - Stiff skin tag, partially crystallized and detached.      Follow-up  - A1c test in 6 weeks.      Health Maintenance: Orders placed as applicable to patient     Objective:     Exam:  Objective:  Vitals:    05/13/25 1133   BP: 138/88   Pulse: 100   Temp: 36.1 °C (96.9 °F)   SpO2: 95%     Physical Exam  Physical Exam    Constitutional:       Appearance: Normal appearance.   Eyes:      Extraocular Movements: Extraocular movements intact.   Pulmonary:      Effort: Pulmonary effort is normal.   Neurological:      General: No focal deficit present.      Mental Status: She is alert and oriented to person, place, and time.   Psychiatric:         Mood and Affect: Mood normal.         Behavior: Behavior normal.       Return in about 6 weeks (around 6/24/2025).    Please note that this dictation was created using voice recognition software. I have made every reasonable attempt to correct obvious errors, but I expect that there are errors of grammar and possibly content that I did not discover before finalizing the note.

## 2025-06-23 ENCOUNTER — APPOINTMENT (OUTPATIENT)
Dept: URBAN - METROPOLITAN AREA CLINIC 6 | Facility: CLINIC | Age: 35
Setting detail: DERMATOLOGY
End: 2025-06-23

## 2025-06-23 DIAGNOSIS — D22 MELANOCYTIC NEVI: ICD-10-CM

## 2025-06-23 DIAGNOSIS — D18.0 HEMANGIOMA: ICD-10-CM

## 2025-06-23 DIAGNOSIS — L81.4 OTHER MELANIN HYPERPIGMENTATION: ICD-10-CM

## 2025-06-23 DIAGNOSIS — L91.8 OTHER HYPERTROPHIC DISORDERS OF THE SKIN: ICD-10-CM

## 2025-06-23 DIAGNOSIS — L259 CONTACT DERMATITIS AND OTHER ECZEMA, UNSPECIFIED CAUSE: ICD-10-CM

## 2025-06-23 DIAGNOSIS — Z71.89 OTHER SPECIFIED COUNSELING: ICD-10-CM

## 2025-06-23 DIAGNOSIS — L82.1 OTHER SEBORRHEIC KERATOSIS: ICD-10-CM

## 2025-06-23 PROBLEM — D18.01 HEMANGIOMA OF SKIN AND SUBCUTANEOUS TISSUE: Status: ACTIVE | Noted: 2025-06-23

## 2025-06-23 PROBLEM — D22.39 MELANOCYTIC NEVI OF OTHER PARTS OF FACE: Status: ACTIVE | Noted: 2025-06-23

## 2025-06-23 PROBLEM — D22.5 MELANOCYTIC NEVI OF TRUNK: Status: ACTIVE | Noted: 2025-06-23

## 2025-06-23 PROBLEM — L30.8 OTHER SPECIFIED DERMATITIS: Status: ACTIVE | Noted: 2025-06-23

## 2025-06-23 PROBLEM — D22.72 MELANOCYTIC NEVI OF LEFT LOWER LIMB, INCLUDING HIP: Status: ACTIVE | Noted: 2025-06-23

## 2025-06-23 PROBLEM — D22.61 MELANOCYTIC NEVI OF RIGHT UPPER LIMB, INCLUDING SHOULDER: Status: ACTIVE | Noted: 2025-06-23

## 2025-06-23 PROBLEM — D22.71 MELANOCYTIC NEVI OF RIGHT LOWER LIMB, INCLUDING HIP: Status: ACTIVE | Noted: 2025-06-23

## 2025-06-23 PROBLEM — D22.62 MELANOCYTIC NEVI OF LEFT UPPER LIMB, INCLUDING SHOULDER: Status: ACTIVE | Noted: 2025-06-23

## 2025-06-23 PROCEDURE — ? SUNSCREEN TREATMENT REGIMEN

## 2025-06-23 PROCEDURE — ? PRESCRIPTION

## 2025-06-23 PROCEDURE — ? ADDITIONAL NOTES

## 2025-06-23 PROCEDURE — ? COUNSELING

## 2025-06-23 RX ORDER — TRIAMCINOLONE ACETONIDE 1 MG/G
CREAM TOPICAL BID
Qty: 30 | Refills: 0 | Status: ERX | COMMUNITY
Start: 2025-06-23

## 2025-06-23 RX ADMIN — TRIAMCINOLONE ACETONIDE: 1 CREAM TOPICAL at 00:00

## 2025-06-23 ASSESSMENT — LOCATION ZONE DERM
LOCATION ZONE: TRUNK
LOCATION ZONE: FEET
LOCATION ZONE: LEG
LOCATION ZONE: FACE
LOCATION ZONE: ARM

## 2025-06-23 ASSESSMENT — LOCATION DETAILED DESCRIPTION DERM
LOCATION DETAILED: RIGHT ANTERIOR DISTAL UPPER ARM
LOCATION DETAILED: RIGHT DISTAL POSTERIOR UPPER ARM
LOCATION DETAILED: LEFT DISTAL POSTERIOR UPPER ARM
LOCATION DETAILED: LEFT MEDIAL UPPER BACK
LOCATION DETAILED: RIGHT ANTERIOR PROXIMAL THIGH
LOCATION DETAILED: LEFT INFERIOR MEDIAL FOREHEAD
LOCATION DETAILED: RIGHT MEDIAL PLANTAR MIDFOOT
LOCATION DETAILED: MIDDLE STERNUM
LOCATION DETAILED: INFERIOR THORACIC SPINE
LOCATION DETAILED: EPIGASTRIC SKIN
LOCATION DETAILED: RIGHT MEDIAL FOREHEAD
LOCATION DETAILED: LOWER STERNUM
LOCATION DETAILED: RIGHT MEDIAL POSTERIOR THIGH
LOCATION DETAILED: SUPERIOR THORACIC SPINE
LOCATION DETAILED: LEFT ANTERIOR DISTAL UPPER ARM
LOCATION DETAILED: LEFT ANTERIOR PROXIMAL THIGH
LOCATION DETAILED: LEFT SUPERIOR MEDIAL UPPER BACK

## 2025-06-23 ASSESSMENT — LOCATION SIMPLE DESCRIPTION DERM
LOCATION SIMPLE: LEFT THIGH
LOCATION SIMPLE: RIGHT UPPER ARM
LOCATION SIMPLE: RIGHT POSTERIOR UPPER ARM
LOCATION SIMPLE: CHEST
LOCATION SIMPLE: RIGHT FOREHEAD
LOCATION SIMPLE: LEFT UPPER BACK
LOCATION SIMPLE: LEFT UPPER ARM
LOCATION SIMPLE: ABDOMEN
LOCATION SIMPLE: UPPER BACK
LOCATION SIMPLE: LEFT POSTERIOR UPPER ARM
LOCATION SIMPLE: RIGHT INNER THIGH
LOCATION SIMPLE: LEFT FOREHEAD
LOCATION SIMPLE: RIGHT PLANTAR SURFACE
LOCATION SIMPLE: RIGHT THIGH

## 2025-06-27 ENCOUNTER — HOSPITAL ENCOUNTER (OUTPATIENT)
Dept: LAB | Facility: MEDICAL CENTER | Age: 35
End: 2025-06-27
Payer: COMMERCIAL

## 2025-06-27 DIAGNOSIS — E55.9 VITAMIN D DEFICIENCY: ICD-10-CM

## 2025-06-27 DIAGNOSIS — D50.9 MICROCYTIC ANEMIA: ICD-10-CM

## 2025-06-27 DIAGNOSIS — E11.9 TYPE 2 DIABETES MELLITUS WITHOUT COMPLICATION, WITHOUT LONG-TERM CURRENT USE OF INSULIN (HCC): ICD-10-CM

## 2025-06-27 LAB
25(OH)D3 SERPL-MCNC: 35 NG/ML (ref 30–100)
ALBUMIN SERPL BCP-MCNC: 3.8 G/DL (ref 3.2–4.9)
ALBUMIN/GLOB SERPL: 1.1 G/DL
ALP SERPL-CCNC: 77 U/L (ref 30–99)
ALT SERPL-CCNC: 18 U/L (ref 2–50)
ANION GAP SERPL CALC-SCNC: 15 MMOL/L (ref 7–16)
AST SERPL-CCNC: 25 U/L (ref 12–45)
BASOPHILS # BLD AUTO: 0.4 % (ref 0–1.8)
BASOPHILS # BLD: 0.04 K/UL (ref 0–0.12)
BILIRUB SERPL-MCNC: 0.3 MG/DL (ref 0.1–1.5)
BUN SERPL-MCNC: 11 MG/DL (ref 8–22)
CALCIUM ALBUM COR SERPL-MCNC: 9.2 MG/DL (ref 8.5–10.5)
CALCIUM SERPL-MCNC: 9 MG/DL (ref 8.5–10.5)
CHLORIDE SERPL-SCNC: 102 MMOL/L (ref 96–112)
CHOLEST SERPL-MCNC: 198 MG/DL (ref 100–199)
CO2 SERPL-SCNC: 18 MMOL/L (ref 20–33)
CREAT SERPL-MCNC: 0.58 MG/DL (ref 0.5–1.4)
EOSINOPHIL # BLD AUTO: 0.19 K/UL (ref 0–0.51)
EOSINOPHIL NFR BLD: 2.1 % (ref 0–6.9)
ERYTHROCYTE [DISTWIDTH] IN BLOOD BY AUTOMATED COUNT: 42.6 FL (ref 35.9–50)
EST. AVERAGE GLUCOSE BLD GHB EST-MCNC: 189 MG/DL
FASTING STATUS PATIENT QL REPORTED: NORMAL
GFR SERPLBLD CREATININE-BSD FMLA CKD-EPI: 119 ML/MIN/1.73 M 2
GLOBULIN SER CALC-MCNC: 3.6 G/DL (ref 1.9–3.5)
GLUCOSE SERPL-MCNC: 157 MG/DL (ref 65–99)
HBA1C MFR BLD: 8.2 % (ref 4–5.6)
HCT VFR BLD AUTO: 42.7 % (ref 37–47)
HDLC SERPL-MCNC: 40 MG/DL
HGB BLD-MCNC: 13.6 G/DL (ref 12–16)
IMM GRANULOCYTES # BLD AUTO: 0.03 K/UL (ref 0–0.11)
IMM GRANULOCYTES NFR BLD AUTO: 0.3 % (ref 0–0.9)
IRON SATN MFR SERPL: 10 % (ref 15–55)
IRON SERPL-MCNC: 47 UG/DL (ref 40–170)
LDLC SERPL CALC-MCNC: 105 MG/DL
LYMPHOCYTES # BLD AUTO: 2.57 K/UL (ref 1–4.8)
LYMPHOCYTES NFR BLD: 27.8 % (ref 22–41)
MCH RBC QN AUTO: 25.5 PG (ref 27–33)
MCHC RBC AUTO-ENTMCNC: 31.9 G/DL (ref 32.2–35.5)
MCV RBC AUTO: 80 FL (ref 81.4–97.8)
MONOCYTES # BLD AUTO: 0.43 K/UL (ref 0–0.85)
MONOCYTES NFR BLD AUTO: 4.6 % (ref 0–13.4)
NEUTROPHILS # BLD AUTO: 5.99 K/UL (ref 1.82–7.42)
NEUTROPHILS NFR BLD: 64.8 % (ref 44–72)
NRBC # BLD AUTO: 0 K/UL
NRBC BLD-RTO: 0 /100 WBC (ref 0–0.2)
PLATELET # BLD AUTO: 374 K/UL (ref 164–446)
PMV BLD AUTO: 10 FL (ref 9–12.9)
POTASSIUM SERPL-SCNC: 4 MMOL/L (ref 3.6–5.5)
PROT SERPL-MCNC: 7.4 G/DL (ref 6–8.2)
RBC # BLD AUTO: 5.34 M/UL (ref 4.2–5.4)
SODIUM SERPL-SCNC: 135 MMOL/L (ref 135–145)
TIBC SERPL-MCNC: 477 UG/DL (ref 250–450)
TRIGL SERPL-MCNC: 265 MG/DL (ref 0–149)
UIBC SERPL-MCNC: 430 UG/DL (ref 110–370)
WBC # BLD AUTO: 9.3 K/UL (ref 4.8–10.8)

## 2025-06-27 PROCEDURE — 80053 COMPREHEN METABOLIC PANEL: CPT

## 2025-06-27 PROCEDURE — 83540 ASSAY OF IRON: CPT

## 2025-06-27 PROCEDURE — 80061 LIPID PANEL: CPT

## 2025-06-27 PROCEDURE — 36415 COLL VENOUS BLD VENIPUNCTURE: CPT

## 2025-06-27 PROCEDURE — 83550 IRON BINDING TEST: CPT

## 2025-06-27 PROCEDURE — 85025 COMPLETE CBC W/AUTO DIFF WBC: CPT

## 2025-06-27 PROCEDURE — 82306 VITAMIN D 25 HYDROXY: CPT

## 2025-06-27 PROCEDURE — 83036 HEMOGLOBIN GLYCOSYLATED A1C: CPT

## 2025-07-02 ENCOUNTER — APPOINTMENT (OUTPATIENT)
Dept: MEDICAL GROUP | Facility: PHYSICIAN GROUP | Age: 35
End: 2025-07-02
Payer: COMMERCIAL

## 2025-07-02 VITALS
WEIGHT: 232.2 LBS | TEMPERATURE: 98.9 F | OXYGEN SATURATION: 96 % | HEART RATE: 111 BPM | BODY MASS INDEX: 45.59 KG/M2 | DIASTOLIC BLOOD PRESSURE: 82 MMHG | RESPIRATION RATE: 25 BRPM | SYSTOLIC BLOOD PRESSURE: 132 MMHG | HEIGHT: 60 IN

## 2025-07-02 DIAGNOSIS — D50.9 MICROCYTIC ANEMIA: Primary | ICD-10-CM

## 2025-07-02 DIAGNOSIS — E11.65 TYPE 2 DIABETES MELLITUS WITH HYPERGLYCEMIA, WITHOUT LONG-TERM CURRENT USE OF INSULIN (HCC): ICD-10-CM

## 2025-07-02 PROCEDURE — 3079F DIAST BP 80-89 MM HG: CPT

## 2025-07-02 PROCEDURE — 99214 OFFICE O/P EST MOD 30 MIN: CPT

## 2025-07-02 PROCEDURE — 3075F SYST BP GE 130 - 139MM HG: CPT

## 2025-07-02 ASSESSMENT — FIBROSIS 4 INDEX: FIB4 SCORE: 0.58

## 2025-07-02 NOTE — PROGRESS NOTES
Verbal consent was acquired by the patient to use [employer] ambient listening note generation during this visit     Subjective:     HPI:   History of Present Illness  The patient is a 35-year-old female presenting for follow-up regarding laboratory results.    She was initiated on metformin for the management of type 2 diabetes mellitus; however, her glycated hemoglobin (A1c) has increased to 8.2%, representing her highest recorded levels despite adherence to metformin therapy. She initially experienced mild nausea, which was managed with over-the-counter antacids such as calcium carbonate (Tums) or bismuth subsalicylate (Pepto-Bismol). She reports fluctuations in appetite, characterized by episodes of polyphagia, rapid satiety, and recurrent hunger within an hour. She also describes episodes of presyncope, which she attributes to fluctuations in blood glucose levels. Her bowel habits have changed, with an increase in frequency to twice daily and a more watery consistency. She expresses concern about the potential development of hemorrhoids following a weekend of frequent bowel movements.    The patient is currently undergoing iron replacement therapy, with improvement in serum iron levels to 47 µg/dL and mean corpuscular volume (MCV) to 80 fL. Although her iron levels have normalized, other laboratory parameters remain suggestive of anemia. She has been on hormonal contraceptive therapy for approximately one year to manage menorrhagia.    GYNECOLOGICAL HISTORY:  - Menstrual Flow: Heavy    PAST SURGICAL HISTORY:  -  section    FAMILY HISTORY  Mother has anemia. No family history of thyroid, pancreatic, or stomach cancers. Family history of heart problems and diabetes.        Assessment & Plan:     Problem List Items Addressed This Visit       Type 2 diabetes mellitus without complication, without long-term current use of insulin (HCC)    Relevant Medications    Tirzepatide 2.5 MG/0.5ML Solution  Auto-injector     Other Visit Diagnoses         Microcytic anemia    -  Primary    Relevant Orders    CBC WITHOUT DIFFERENTIAL    IRON/TOTAL IRON BIND              Assessment & Plan  1. Diabetes Mellitus: Chronic, uncontrolled.   - A1c increased to 8.2 despite metformin.  - Reports nausea and changes in bowel habits, including more frequent and watery stools.  - Discussed potential side effects of tirzepatide, including nausea, constipation, intestinal obstruction, and pancreatitis. Advised adequate water and fiber intake.  - Prescribed tirzepatide 2.5 mg weekly, with plans to increase to 5 mg weekly after a month if tolerated.  - Continue metformin as tolerated.  - Send [employer] message in a couple of weeks to report progress.  - Labs to be rechecked in a few months.    2. Iron Deficiency Anemia: Chronic and ongoing   - Iron levels improved to 47, MCV to 80.  - Improvement attributed to iron replacement therapy and birth control reducing heavy menstrual bleeding.  - Continue current iron supplementation regimen.  - Labs to be rechecked in a few months.    Follow-up  - Follow-up appointment scheduled in 3 months.    Return in about 3 months (around 10/2/2025) for lab follow up.    Health Maintenance: Orders placed as applicable to patient     Objective:     Exam:  Objective:  Vitals:    07/02/25 1030   BP: 132/82   Pulse: (!) 111   Resp: (!) 25   Temp: 37.2 °C (98.9 °F)   SpO2: 96%     Physical Exam  Physical Exam    Constitutional:       Appearance: Normal appearance.   Eyes:      Extraocular Movements: Extraocular movements intact.   Pulmonary:      Effort: Pulmonary effort is normal.   Neurological:      General: No focal deficit present.      Mental Status: She is alert and oriented to person, place, and time.   Psychiatric:         Mood and Affect: Mood normal.         Behavior: Behavior normal.       Please note that this dictation was created using voice recognition software. I have made every reasonable  attempt to correct obvious errors, but I expect that there are errors of grammar and possibly content that I did not discover before finalizing the note.

## 2025-07-07 ENCOUNTER — TELEPHONE (OUTPATIENT)
Dept: MEDICAL GROUP | Facility: PHYSICIAN GROUP | Age: 35
End: 2025-07-07
Payer: COMMERCIAL

## 2025-07-07 NOTE — TELEPHONE ENCOUNTER
Message from Plan  PA Case: [id number], Status: Approved, Coverage Starts on: 7/7/2025 12:00:00 AM, Coverage Ends on: 7/7/2026 12:00:00 AM.. Authorization Expiration Date: July 7, 2026.

## (undated) DEVICE — HEAD HOLDER JUNIOR/ADULT

## (undated) DEVICE — KIT ANESTHESIA W/CIRCUIT & 3/LT BAG W/FILTER (20EA/CA)

## (undated) DEVICE — GOWN WARMING STANDARD FLEX - (30/CA)

## (undated) DEVICE — SUTURE GENERAL

## (undated) DEVICE — TUBE E-T HI-LO CUFF 7.0MM (10EA/PK)

## (undated) DEVICE — SPONGE GAUZESTER. 2X2 4-PL - (2/PK 50PK/BX 30BX/CS)

## (undated) DEVICE — NEEDLE INSFL 120MM 14GA VRRS - (20/BX)

## (undated) DEVICE — SUTURE 0 VICRYL PLUS UR-6 - 27 INCH (36/BX)

## (undated) DEVICE — SET EXTENSION WITH 2 PORTS (48EA/CA) ***PART #2C8610 IS A SUBSTITUTE*****

## (undated) DEVICE — SENSOR SPO2 NEO LNCS ADHESIVE (20/BX) SEE USER NOTES

## (undated) DEVICE — SODIUM CHL IRRIGATION 0.9% 1000ML (12EA/CA)

## (undated) DEVICE — GUIDE TRACHE TUBE INTUBATING STYLET 5.0-10.0MM 14FR (20EA/PK)

## (undated) DEVICE — KIT ROOM DECONTAMINATION

## (undated) DEVICE — TUBE CONNECT SUCTION CLEAR 120 X 1/4" (50EA/CA)"

## (undated) DEVICE — TROCAR Z THREAD 11 X 100 - BLADED (6/BX)

## (undated) DEVICE — TRAY SKIN SCRUB PVP WET (20EA/CA) PART #DYND70356 DISCONTINUED

## (undated) DEVICE — GLOVE BIOGEL INDICATOR SZ 8 SURGICAL PF LTX - (50/BX 4BX/CA)

## (undated) DEVICE — DETERGENT RENUZYME PLUS 10 OZ PACKET (50/BX)

## (undated) DEVICE — GOWN SURGEONS X-LARGE - DISP. (30/CA)

## (undated) DEVICE — SUTURE 4-0 VICRYL PLUS FS-2 - 27 INCH (36/BX)

## (undated) DEVICE — BAG RETRIEVAL 10ML (10EA/BX)

## (undated) DEVICE — DRAPESURG STERI-DRAPE LONG - (10/BX 4BX/CA)

## (undated) DEVICE — LACTATED RINGERS INJ 1000 ML - (14EA/CA 60CA/PF)

## (undated) DEVICE — SUCTION INSTRUMENT YANKAUER BULBOUS TIP W/O VENT (50EA/CA)

## (undated) DEVICE — GLOVE BIOGEL PI ORTHO SZ 7 PF LF (40PR/BX)

## (undated) DEVICE — ELECTRODE 850 FOAM ADHESIVE - HYDROGEL RADIOTRNSPRNT (50/PK)

## (undated) DEVICE — PACK LAP CHOLE OR - (2EA/CA)

## (undated) DEVICE — SET LEADWIRE 5 LEAD BEDSIDE DISPOSABLE ECG (1SET OF 5/EA)

## (undated) DEVICE — CLIP MED LG INTNL HRZN TI ESCP - (20/BX)

## (undated) DEVICE — PROTECTOR ULNA NERVE - (36PR/CA)

## (undated) DEVICE — DRESSING TRANSPARENT FILM TEGADERM 2.375 X 2.75"  (100EA/BX)"

## (undated) DEVICE — ELECTRODE DUAL RETURN W/ CORD - (50/PK)

## (undated) DEVICE — CANNULA W/SEAL 5X100 Z-THRE - ADED KII (12/BX)

## (undated) DEVICE — CANISTER SUCTION 3000ML MECHANICAL FILTER AUTO SHUTOFF MEDI-VAC NONSTERILE LF DISP  (40EA/CA)

## (undated) DEVICE — TUBING SETDISPOS HIGH FLOW II - (10/BX)

## (undated) DEVICE — MASK ANESTHESIA ADULT  - (100/CA)

## (undated) DEVICE — SUTURE 0 COATED VICRYL 6-18IN - (12PK/BX)

## (undated) DEVICE — NEPTUNE 4 PORT MANIFOLD - (20/PK)

## (undated) DEVICE — GLOVE BIOGEL SZ 8 SURGICAL PF LTX - (50PR/BX 4BX/CA)

## (undated) DEVICE — SET SUCTION/IRRIGATION WITH DISPOSABLE TIP (6/CA )PART #0250-070-520 IS A SUB

## (undated) DEVICE — CANNULA W/SEAL11X100ZTHREAD - (12/BX)

## (undated) DEVICE — TROCAR 5X100 BLADED Z-THREAD - KII (6/BX)

## (undated) DEVICE — TUBING CLEARLINK DUO-VENT - C-FLO (48EA/CA)

## (undated) DEVICE — GLOVE BIOGEL SZ 6.5 SURGICAL PF LTX (50PR/BX 4BX/CA)

## (undated) DEVICE — GLOVE, BIOGEL ECLIPSE, SZ 7.0, PF LTX (50/BX)